# Patient Record
Sex: FEMALE | Race: WHITE | NOT HISPANIC OR LATINO | Employment: FULL TIME | URBAN - METROPOLITAN AREA
[De-identification: names, ages, dates, MRNs, and addresses within clinical notes are randomized per-mention and may not be internally consistent; named-entity substitution may affect disease eponyms.]

---

## 2017-02-05 ENCOUNTER — E-VISIT (OUTPATIENT)
Dept: FAMILY MEDICINE | Facility: CLINIC | Age: 32
End: 2017-02-05
Payer: COMMERCIAL

## 2017-02-05 DIAGNOSIS — R10.32 ABDOMINAL PAIN, LEFT LOWER QUADRANT: Primary | ICD-10-CM

## 2017-02-05 PROCEDURE — 99207 ZZC NO BILLABLE SERVICE THIS VISIT: CPT | Performed by: FAMILY MEDICINE

## 2017-02-05 NOTE — MR AVS SNAPSHOT
After Visit Summary   2/5/2017    Katiuska De Jesus    MRN: [id number]           Patient Information     Date Of Birth          1985        Visit Information        Provider Department      2/5/2017 9:02 PM Claudia Thomas MD [facility]        Today's Diagnoses     Abdominal pain, left lower quadrant    -  1       Follow-ups after your visit        Who to contact     If you have questions or need follow up information about today's clinic visit or your schedule please contact [facility] directly at [phone].  Normal or non-critical lab and imaging results will be communicated to you by Netviewerhart, letter or phone within 4 business days after the clinic has received the results. If you do not hear from us within 7 days, please contact the clinic through ShoorKt or phone. If you have a critical or abnormal lab result, we will notify you by phone as soon as possible.  Submit refill requests through Quofore or call your pharmacy and they will forward the refill request to us. Please allow 3 business days for your refill to be completed.          Additional Information About Your Visit        MyChart Information     Quofore gives you secure access to your electronic health record. If you see a primary care provider, you can also send messages to your care team and make appointments. If you have questions, please call your primary care clinic.  If you do not have a primary care provider, please call [phone] and they will assist you.        Care EveryWhere ID     This is your Care EveryWhere ID. This could be used by other organizations to access your [place] medical records  [id number]         Blood Pressure from Last 3 Encounters:   06/17/16 100/60   11/08/15 100/62   10/22/15 104/60    Weight from Last 3 Encounters:   06/17/16 168 lb 12.8 oz (76.6 kg)   11/08/15 174 lb (78.9 kg)   10/22/15 174 lb (78.9 kg)              Today, you had the following      No orders found for display       Primary Care Provider Office Phone # Fax #    Claudia Thomas [phone]       [facility] [address]  [place] [address]        Thank you!     Thank you for choosing [facility]  for your care. Our goal is always to provide you with excellent care. Hearing back from our patients is one way we can continue to improve our services. Please take a few minutes to complete the written survey that you may receive in the mail after your visit with us. Thank you!             Your Updated Medication List - Protect others around you: Learn how to safely use, store and throw away your medicines at www.disposemymeds.org.          This list is accurate as of: 2/5/17 11:59 PM.  Always use your most recent med list.                   Brand Name Dispense Instructions for use    levothyroxine 50 MCG tablet    SYNTHROID/LEVOTHROID    90 tablet    Take 1 tablet (50 mcg) by mouth daily       loratadine 10 MG capsule      Take 10 mg by mouth daily.       norethindrone 0.35 MG per tablet    TRINI    28 tablet    Take 1 tablet (0.35 mg) by mouth daily       UNKNOWN TO PATIENT      Birth control       vitamin D 1000 UNITS capsule      Take 3 capsules by mouth daily.

## 2017-02-06 NOTE — TELEPHONE ENCOUNTER
Not sure what she means, but this is not something I can diagnose over a Numerifyt message based on the information provided.  Please triage, thanks.

## 2017-06-04 DIAGNOSIS — E03.9 ACQUIRED HYPOTHYROIDISM: ICD-10-CM

## 2017-06-04 NOTE — TELEPHONE ENCOUNTER
Medication Detail      Disp Refills Start End SALMA   levothyroxine (SYNTHROID, LEVOTHROID) 50 MCG tablet 90 tablet 3 6/17/2016  No   Sig: Take 1 tablet (50 mcg) by mouth daily       Last Office Visit with G, P or [facility] prescribing provider: 6/17/2016  Next 5 appointments (look out 90 days)     Jun 07, 2017  2:40 PM CDT   PHYS[facility] with Claudia Thomas MD   [facility]    [address] [place] [id number] [phone]                   TSH   Date Value Ref Range Status   05/29/2015 1.91 0.40 - 4.00 mU/L Final

## 2017-06-05 RX ORDER — LEVOTHYROXINE SODIUM 50 UG/1
TABLET ORAL
Qty: 90 TABLET | Refills: 0 | Status: SHIPPED | OUTPATIENT
Start: 2017-06-05 | End: 2017-06-07

## 2017-06-05 NOTE — TELEPHONE ENCOUNTER
Agree, needs office visit yearly (due this month) and labs.  I will refill.  I will just order the lab when I see her in the office, in case there are other labs that we need to get at that time.

## 2017-06-05 NOTE — TELEPHONE ENCOUNTER
Routing refill request to provider for review/approval because:  Labs not current:  TSH  Due for annual office visit     Routing to  Reception - please advise patient due for annual office visit in 10 days with lab recheck of thyroid    Thank you,  Dianna Fuller RN

## 2017-06-07 ENCOUNTER — OFFICE VISIT (OUTPATIENT)
Dept: FAMILY MEDICINE | Facility: CLINIC | Age: 32
End: 2017-06-07
Payer: COMMERCIAL

## 2017-06-07 VITALS
RESPIRATION RATE: 16 BRPM | TEMPERATURE: 98.4 F | WEIGHT: 168.8 LBS | SYSTOLIC BLOOD PRESSURE: 116 MMHG | BODY MASS INDEX: 28.12 KG/M2 | OXYGEN SATURATION: 97 % | DIASTOLIC BLOOD PRESSURE: 68 MMHG | HEIGHT: 65 IN | HEART RATE: 70 BPM

## 2017-06-07 DIAGNOSIS — E03.9 ACQUIRED HYPOTHYROIDISM: ICD-10-CM

## 2017-06-07 DIAGNOSIS — Z91.018 FOOD ALLERGY: ICD-10-CM

## 2017-06-07 DIAGNOSIS — Z00.00 ROUTINE GENERAL MEDICAL EXAMINATION AT A HEALTH CARE FACILITY: Primary | ICD-10-CM

## 2017-06-07 DIAGNOSIS — K11.20 SIALADENITIS: ICD-10-CM

## 2017-06-07 DIAGNOSIS — Z11.3 SCREEN FOR STD (SEXUALLY TRANSMITTED DISEASE): ICD-10-CM

## 2017-06-07 LAB
MICRO REPORT STATUS: NORMAL
SPECIMEN SOURCE: NORMAL
WET PREP SPEC: NORMAL

## 2017-06-07 PROCEDURE — 84443 ASSAY THYROID STIM HORMONE: CPT | Performed by: FAMILY MEDICINE

## 2017-06-07 PROCEDURE — 86003 ALLG SPEC IGE CRUDE XTRC EA: CPT | Performed by: FAMILY MEDICINE

## 2017-06-07 PROCEDURE — 87491 CHLMYD TRACH DNA AMP PROBE: CPT | Performed by: FAMILY MEDICINE

## 2017-06-07 PROCEDURE — 99395 PREV VISIT EST AGE 18-39: CPT | Performed by: FAMILY MEDICINE

## 2017-06-07 PROCEDURE — 36415 COLL VENOUS BLD VENIPUNCTURE: CPT | Performed by: FAMILY MEDICINE

## 2017-06-07 PROCEDURE — 87389 HIV-1 AG W/HIV-1&-2 AB AG IA: CPT | Performed by: FAMILY MEDICINE

## 2017-06-07 PROCEDURE — 86780 TREPONEMA PALLIDUM: CPT | Performed by: FAMILY MEDICINE

## 2017-06-07 PROCEDURE — 87210 SMEAR WET MOUNT SALINE/INK: CPT | Performed by: FAMILY MEDICINE

## 2017-06-07 PROCEDURE — 87591 N.GONORRHOEAE DNA AMP PROB: CPT | Performed by: FAMILY MEDICINE

## 2017-06-07 PROCEDURE — 99212 OFFICE O/P EST SF 10 MIN: CPT | Mod: 25 | Performed by: FAMILY MEDICINE

## 2017-06-07 RX ORDER — LEVOTHYROXINE SODIUM 50 UG/1
50 TABLET ORAL DAILY
Qty: 90 TABLET | Refills: 3 | Status: SHIPPED | OUTPATIENT
Start: 2017-06-07 | End: 2020-10-19

## 2017-06-07 NOTE — PROGRESS NOTES
SUBJECTIVE:     CC: Katiuska De Jesus is an 31 year old woman who presents for preventive health visit.     Physical   Annual:     Getting at least 3 servings of Calcium per day::  Yes    Bi-annual eye exam::  NO    Dental care twice a year::  NO    Sleep apnea or symptoms of sleep apnea::  Daytime drowsiness    Diet::  Gluten-free/reduced    Frequency of exercise::  2-3 days/week    Duration of exercise::  15-30 minutes    Taking medications regularly::  Yes    Medication side effects::  None    Additional concerns today::  YES (STD check)    CV: no known early CAD in the family  Malignancy: no known breast or colon cancer.  She states she is due for a pap; however she had a normal pap and negative HPV test last year.    Bone health: hypothyroidism  Immunizations: tdap done 2013  Sexual health:Periods have become a little irregular (hda one period that lasted 2 weeks in the middle of the pill pack, then had a normal period two weeks later during the placebo week) despite good compliance with progesterone-only OCP (she has h/o migraine with aura, so is not on combined hormones).  She is thinking about stopping the OCP.  She has had one new partner since her last STI screen; the partner's status is unknown. She used a condom. She has had one regular period since that time.  She has noticed intermittent discharge that typically precedes her period (yellowish/greenish).  She wonders if the vitamins she takes might affect this.  Finally, she notes that she has had dyspareunia ever since she became sexually active (age 24).  She did not know that this was not normal until recently.    Depression screen: neg    Other: over the past one year she has been having intermittent pain over her left salivary gland (points to submandibular and parotid areas); lately the pain has become more constant.  eating sweets seems to make it worse.  At times, the pain radiates up to her TMJ, and it hurts to open her jaw.  She has noticed  some swelling over the area.  She has tried benadryl tylenol ASA--nothing has helped.  Pain is stabbing.  Applying pressure over the area seems to help, so she does this especially when she is eating.  She has also had a popping sound like popcorn i her left ear, which has been ongoing for a few years but now seems worse with her gland/jaw pain.  It is worse when she lies down on her right side.  She has to sleep on her left side; this seems to muffle the sound.     She has had some lower back pain associated with sitting in her chair at work.  She has some intermittent tingling inn her back; none in her extremities.      Positive LETICIA, headaches, skin rash when she is in the sun, blurry vision, mouth sores--she never saw a rheumatologist, as her deductible is high.    She feels she is sensitive to many types of foods and would like allergy testing done.      Today's PHQ-2 Score:   PHQ-2 ( 1999 Pfizer) 6/5/2017   Q1: Little interest or pleasure in doing things 0   Q2: Feeling down, depressed or hopeless 1   PHQ-2 Score 1   Q1: Little interest or pleasure in doing things Not at all   Q2: Feeling down, depressed or hopeless Several days   PHQ-2 Score 1       Abuse: Current or Past(Physical, Sexual or Emotional)- No  Do you feel safe in your environment - Yes    Social History   Substance Use Topics     Smoking status: Never Smoker     Smokeless tobacco: Never Used      Comment: smokers in home     Alcohol use Yes      Comment: rare     The patient does not drink >3 drinks per day nor >7 drinks per week.    Recent Labs   Lab Test  07/19/13   1407   CHOL  146   HDL  45*   LDL  77   TRIG  116   CHOLHDLRATIO  3.2       Reviewed orders with patient.  Reviewed health maintenance and updated orders accordingly - Yes    Mammo Decision Support:  Mammogram not appropriate for this patient based on age.    Pertinent mammograms are reviewed under the imaging tab.  History of abnormal Pap smear: NO - age 30-65 PAP every 5 years  with negative HPV co-testing recommended    Reviewed and updated as needed this visit by clinical staff         Reviewed and updated as needed this visit by Provider            ROS:  C: NEGATIVE for fever, chills, change in weight  I: NEGATIVE for worrisome rashes, moles or lesions  E: NEGATIVE for vision changes or irritation  ENT: see HPI  R: NEGATIVE for significant cough or SOB  B: NEGATIVE for masses, tenderness or discharge  CV: NEGATIVE for chest pain, palpitations or peripheral edema  GI: NEGATIVE for nausea, abdominal pain, heartburn, or change in bowel habits  : NEGATIVE for unusual urinary or vaginal symptoms. See HPI.  M: NEGATIVE for significant arthralgias or myalgia other than back pain.  N: NEGATIVE for weakness, dizziness or paresthesias  P: NEGATIVE for changes in mood or affect    Patient Active Problem List   Diagnosis     Hypothyroidism     Past Surgical History:   Procedure Laterality Date     NO HISTORY OF SURGERY         Social History   Substance Use Topics     Smoking status: Never Smoker     Smokeless tobacco: Never Used      Comment: smokers in home     Alcohol use Yes      Comment: rare     Family History   Problem Relation Age of Onset     Thyroid Disease Other      Substance Abuse Father      EtOH     Thyroid Disease Maternal Grandmother      CANCER Brother      tumor in his thigh     Other Cancer Brother          Current Outpatient Prescriptions   Medication Sig Dispense Refill     levothyroxine (SYNTHROID/LEVOTHROID) 50 MCG tablet Take 1 tablet (50 mcg) by mouth daily 90 tablet 3     norethindrone (MICRONOR) 0.35 MG per tablet TAKE ONE TABLET BY MOUTH ONE TIME DAILY  28 tablet 0     UNKNOWN TO PATIENT Birth control       Cholecalciferol (VITAMIN D) 1000 UNITS capsule Take 3 capsules by mouth daily.       loratadine 10 MG capsule Take 10 mg by mouth daily.       Allergies   Allergen Reactions     Ibuprofen Sodium Cramps and GI Disturbance     OBJECTIVE:     There were no vitals  taken for this visit.  EXAM:  GENERAL: healthy, alert and no distress  EYES: Eyes grossly normal to inspection, PERRL and conjunctivae and sclerae normal  HENT: ear canals and TM's normal, nose and mouth without ulcers or lesions  NECK: no adenopathy, no asymmetry, masses, or scars and thyroid normal to palpation  RESP: lungs clear to auscultation - no rales, rhonchi or wheezes  BREAST: normal without masses, tenderness or nipple discharge and no palpable axillary masses or adenopathy  CV: regular rate and rhythm, normal S1 S2, no S3 or S4, no murmur, click or rub, no peripheral edema and peripheral pulses strong  ABDOMEN: soft, nontender, no hepatosplenomegaly, no masses and bowel sounds normal  MS: no gross musculoskeletal defects noted, no edema  SKIN: no suspicious lesions or rashes  NEURO: Normal strength and tone, mentation intact and speech normal  PSYCH: mentation appears normal, affect normal/bright    ASSESSMENT/PLAN:     1. Routine general medical examination at a health care facility  CV: she has had normal lipid panel (except low HDL) in 2013, normal fasting glucose (69) last year.    Malignancy: pap and HPV done last year, advised repeat in four more years.  Bone health: thyroid disease.   Immunizations: up to date  Sexual health: see below.  If she would like to see a gynecologist regarding her dyspareunia, or if she desires to try other contraception such as IUD I am happy to refer her.      2. Acquired hypothyroidism  -TSH  - levothyroxine (SYNTHROID/LEVOTHROID) 50 MCG tablet; Take 1 tablet (50 mcg) by mouth daily  Dispense: 90 tablet; Refill: 3    3. Screen for STD (sexually transmitted disease)    - HIV Antigen Antibody Combo  - Anti Treponema  - NEISSERIA GONORRHOEA PCR  - CHLAMYDIA TRACHOMATIS PCR  - Wet prep    4. Food allergy    - Allergy adult food panel    5.  Left gland pain: likely sialadenitis; no evidence for infection at this time, advised conservative treatments.  I don't know whether  "she might have TMJ as a separate condition or whether the jaw pain is related.  The ear symptoms might be related to eustachian tube dysfunction; her ear exam was normal.   -apply warm compresses to left parotid and submandibular glands, can try sucking on lemon candy  -advised ENT evaluation if not improving.       COUNSELING:  Reviewed preventive health counseling, as reflected in patient instructions       Safe sex practices/STD prevention         reports that she has never smoked. She has never used smokeless tobacco.    Estimated body mass index is 28.09 kg/(m^2) as calculated from the following:    Height as of 6/17/16: 5' 5\" (1.651 m).    Weight as of 6/17/16: 168 lb 12.8 oz (76.6 kg).     Counseling Resources:  ATP IV Guidelines  Pooled Cohorts Equation Calculator  Breast Cancer Risk Calculator  FRAX Risk Assessment  ICSI Preventive Guidelines  Dietary Guidelines for Americans, 2010  USDA's MyPlate  ASA Prophylaxis  Lung CA Screening    Claudia Thomas MD  [facility]  Answers for HPI/ROS submitted by the patient on 6/5/2017   PHQ-2 Score: 1    "

## 2017-06-07 NOTE — MR AVS SNAPSHOT
After Visit Summary   6/7/2017    Katiuska De Jesus    MRN: [id number]           Patient Information     Date Of Birth          1985        Visit Information        Provider Department      6/7/2017 2:40 PM Claudia Thomas MD [facility]        Today's Diagnoses     Routine general medical examination at a health care facility    -  1    Acquired hypothyroidism        Screen for STD (sexually transmitted disease)        Food allergy        Sialadenitis          Care Instructions      Preventive Health Recommendations  Female Ages 26 - 39  Yearly exam:   See your health care provider every year in order to    Review health changes.     Discuss preventive care.      Review your medicines if you your doctor has prescribed any.    Until age 30: Get a Pap test every three years (more often if you have had an abnormal result).    After age 30: Talk to your doctor about whether you should have a Pap test every 3 years or have a Pap test with HPV screening every 5 years.   You do not need a Pap test if your uterus was removed (hysterectomy) and you have not had cancer.  You should be tested each year for STDs (sexually transmitted diseases), if you're at risk.   Talk to your provider about how often to have your cholesterol checked.  If you are at risk for diabetes, you should have a diabetes test (fasting glucose).  Shots: Get a flu shot each year. Get a tetanus shot every 10 years.   Nutrition:     Eat at least 5 servings of fruits and vegetables each day.    Eat whole-grain bread, whole-wheat pasta and brown rice instead of white grains and rice.    Talk to your provider about Calcium and Vitamin D.     Lifestyle    Exercise at least 150 minutes a week (30 minutes a day, 5 days of the week). This will help you control your weight and prevent disease.    Limit alcohol to one drink per day.    No smoking.     Wear sunscreen to prevent skin cancer.    See your dentist every six  "months for an exam and cleaning.            Follow-ups after your visit        Who to contact     If you have questions or need follow up information about today's clinic visit or your schedule please contact [facility] directly at [phone].  Normal or non-critical lab and imaging results will be communicated to you by Swirlhart, letter or phone within 4 business days after the clinic has received the results. If you do not hear from us within 7 days, please contact the clinic through Swirlhart or phone. If you have a critical or abnormal lab result, we will notify you by phone as soon as possible.  Submit refill requests through PassHat or call your pharmacy and they will forward the refill request to us. Please allow 3 business days for your refill to be completed.          Additional Information About Your Visit        SwirlharOrgdot Information     PassHat gives you secure access to your electronic health record. If you see a primary care provider, you can also send messages to your care team and make appointments. If you have questions, please call your primary care clinic.  If you do not have a primary care provider, please call [phone] and they will assist you.        Care EveryWhere ID     This is your Care EveryWhere ID. This could be used by other organizations to access your [place] medical records  [id number]        Your Vitals Were     Pulse Temperature Respirations Height Last Period Pulse Oximetry    70 98.4  F (36.9  C) (Tympanic) 16 5' 5\" (1.651 m) 05/17/2017 (Approximate) 97%    BMI (Body Mass Index)                   28.09 kg/m2            Blood Pressure from Last 3 Encounters:   06/07/17 116/68   06/17/16 100/60   11/08/15 100/62    Weight from Last 3 Encounters:   06/07/17 168 lb 12.8 oz (76.6 kg)   06/17/16 168 lb 12.8 oz (76.6 kg)   11/08/15 174 lb (78.9 kg)              We Performed the Following     Allergy adult food panel     Anti Treponema     CHLAMYDIA TRACHOMATIS " PCR     HIV Antigen Antibody Combo     NEISSERIA GONORRHOEA PCR     TSH with free T4 reflex     Wet prep          Today's Medication Changes          These changes are accurate as of: 6/7/17 11:59 PM.  If you have any questions, ask your nurse or doctor.               These medicines have changed or have updated prescriptions.        Dose/Directions    levothyroxine 50 MCG tablet   Commonly known as:  SYNTHROID/LEVOTHROID   This may have changed:  See the new instructions.   Used for:  Acquired hypothyroidism   Changed by:  Claudia Thomas MD        Dose:  50 mcg   Take 1 tablet (50 mcg) by mouth daily   Quantity:  90 tablet   Refills:  3            Where to get your medicines      These medications were sent to [id number] - [place], [place] - [address] [facility]  [address], [place] [address]     Phone:  [phone]     levothyroxine 50 MCG tablet                Primary Care Provider Office Phone # Fax #    Claudia Thomas [phone]       [facility] [address]  [place] [address]        Thank you!     Thank you for choosing [facility]  for your care. Our goal is always to provide you with excellent care. Hearing back from our patients is one way we can continue to improve our services. Please take a few minutes to complete the written survey that you may receive in the mail after your visit with us. Thank you!             Your Updated Medication List - Protect others around you: Learn how to safely use, store and throw away your medicines at www.disposemymeds.org.          This list is accurate as of: 6/7/17 11:59 PM.  Always use your most recent med list.                   Brand Name Dispense Instructions for use    levothyroxine 50 MCG tablet    SYNTHROID/LEVOTHROID    90 tablet    Take 1 tablet (50 mcg) by mouth daily       loratadine 10 MG capsule      Take 10 mg by mouth daily.       norethindrone 0.35 MG per tablet     MICRONOR    28 tablet    TAKE ONE TABLET BY MOUTH ONE TIME DAILY       UNKNOWN TO PATIENT      Birth control       vitamin D 1000 UNITS capsule      Take 3 capsules by mouth daily.

## 2017-06-08 PROBLEM — R76.8 ELEVATED ANTINUCLEAR ANTIBODY (ANA) LEVEL: Status: ACTIVE | Noted: 2017-06-08

## 2017-06-08 LAB
HIV 1+2 AB+HIV1 P24 AG SERPL QL IA: NORMAL
T PALLIDUM IGG+IGM SER QL: NEGATIVE
TSH SERPL DL<=0.005 MIU/L-ACNC: 1.54 MU/L (ref 0.4–4)

## 2017-06-09 LAB
C TRACH DNA SPEC QL NAA+PROBE: NORMAL
CLAM IGE QN: NORMAL KU(A)/L
CODFISH IGE QN: NORMAL KU(A)/L
CORN IGE QN: NORMAL KU(A)/L
COW MILK IGE QN: NORMAL KU/L
EGG WHITE IGE QN: NORMAL KU(A)/L
N GONORRHOEA DNA SPEC QL NAA+PROBE: NORMAL
PEANUT IGE QN: NORMAL KU(A)/L
SCALLOP IGE QN: NORMAL KU(A)/L
SHRIMP IGE QN: NORMAL KU(A)/L
SOYBEAN IGE QN: NORMAL KU(A)/L
SPECIMEN SOURCE: NORMAL
SPECIMEN SOURCE: NORMAL
WALNUT IGE QN: NORMAL KU(A)/L
WHEAT IGE QN: NORMAL KU(A)/L

## 2017-06-29 ENCOUNTER — MYC MEDICAL ADVICE (OUTPATIENT)
Dept: FAMILY MEDICINE | Facility: CLINIC | Age: 32
End: 2017-06-29

## 2017-06-30 RX ORDER — ACETAMINOPHEN AND CODEINE PHOSPHATE 120; 12 MG/5ML; MG/5ML
1 SOLUTION ORAL DAILY
Qty: 84 TABLET | Refills: 3 | Status: SHIPPED | OUTPATIENT
Start: 2017-06-30 | End: 2018-06-02

## 2018-01-05 ENCOUNTER — MYC MEDICAL ADVICE (OUTPATIENT)
Dept: FAMILY MEDICINE | Facility: CLINIC | Age: 33
End: 2018-01-05

## 2018-01-05 DIAGNOSIS — R76.8 ELEVATED ANTINUCLEAR ANTIBODY (ANA) LEVEL: Primary | ICD-10-CM

## 2018-01-05 NOTE — TELEPHONE ENCOUNTER
Are you ok with ordering the giardia test? Or would you like a visit of some kind? Sign off on referral if ok

## 2018-01-08 NOTE — TELEPHONE ENCOUNTER
Referral signed, please give her the numbers.    If she is having diarrhea, ok to just order the test.  Generally not a need to check if no diarrhea.  thanks

## 2018-09-27 ENCOUNTER — RADIANT APPOINTMENT (OUTPATIENT)
Dept: GENERAL RADIOLOGY | Facility: CLINIC | Age: 33
End: 2018-09-27
Attending: NURSE PRACTITIONER
Payer: COMMERCIAL

## 2018-09-27 ENCOUNTER — OFFICE VISIT (OUTPATIENT)
Dept: FAMILY MEDICINE | Facility: CLINIC | Age: 33
End: 2018-09-27
Payer: COMMERCIAL

## 2018-09-27 VITALS
WEIGHT: 163 LBS | SYSTOLIC BLOOD PRESSURE: 116 MMHG | BODY MASS INDEX: 26.2 KG/M2 | TEMPERATURE: 98 F | RESPIRATION RATE: 16 BRPM | HEIGHT: 66 IN | HEART RATE: 79 BPM | DIASTOLIC BLOOD PRESSURE: 82 MMHG | OXYGEN SATURATION: 100 %

## 2018-09-27 DIAGNOSIS — M25.521 RIGHT ELBOW PAIN: Primary | ICD-10-CM

## 2018-09-27 DIAGNOSIS — M25.521 RIGHT ELBOW PAIN: ICD-10-CM

## 2018-09-27 PROCEDURE — 73080 X-RAY EXAM OF ELBOW: CPT | Mod: RT

## 2018-09-27 PROCEDURE — 99213 OFFICE O/P EST LOW 20 MIN: CPT | Performed by: NURSE PRACTITIONER

## 2018-09-27 RX ORDER — FLUTICASONE PROPIONATE 50 MCG
1 SPRAY, SUSPENSION (ML) NASAL DAILY
COMMUNITY

## 2018-09-27 NOTE — PROGRESS NOTES
"  SUBJECTIVE:   Katiuska De Jesus is a 32 year old female who presents to clinic today for the following health issues:  Chief Complaint   Patient presents with     Elbow Pain         Musculoskeletal problem/pain    Duration: since yesterday    Description  Yesterday Katiuska Was doing some work around her house.  As she was pulling her arms down from putting something away on an upper level shelf, as she was coming down forcefully her arm hit the top of the microwave cart.  She had immediate pain in her right elbow.  She had a small abrasion that \"bleeding quickly.  She is here in the clinic today because the pain, swelling and range of motion is all affected.  She denies any other injuries.    Location: right elbow    Intensity:  3-7/10    Accompanying signs and symptoms: pain radiating up front of arm    History  Previous similar problem: no   Previous evaluation:  none    Precipitating or alleviating factors:  Trauma or overuse: YES- patient pulled socks off of the shelves and bumped right elbow on lower shelf  Aggravating factors include: typing, using hand, trying to sleep    Therapies tried and outcome: elevation, keep arm across stomach, Tylenole      Problem list and histories reviewed & adjusted, as indicated.  Additional history: as documented    Patient Active Problem List   Diagnosis     Hypothyroidism     Elevated antinuclear antibody (LETICIA) level     Past Surgical History:   Procedure Laterality Date     NO HISTORY OF SURGERY         Social History   Substance Use Topics     Smoking status: Never Smoker     Smokeless tobacco: Never Used      Comment: smokers in home     Alcohol use Yes      Comment: rare     Family History   Problem Relation Age of Onset     Thyroid Disease Other      Substance Abuse Father      EtOH     Thyroid Disease Maternal Grandmother      Cancer Brother      tumor in his thigh     Other Cancer Brother      Thyroid Disease Mother          Current Outpatient Prescriptions " "  Medication Sig Dispense Refill     fluticasone (FLONASE) 50 MCG/ACT spray Spray 1 spray into both nostrils daily       levothyroxine (SYNTHROID/LEVOTHROID) 50 MCG tablet Take 1 tablet (50 mcg) by mouth daily (Patient taking differently: Take by mouth daily 75 mcg) 90 tablet 3     Cholecalciferol (VITAMIN D) 1000 UNITS capsule Take 3 capsules by mouth daily.       loratadine 10 MG capsule Take 10 mg by mouth daily.       norethindrone (MICRONOR) 0.35 MG per tablet take 1 tablet by mouth daily (Patient not taking: Reported on 9/27/2018) 84 tablet 0     Allergies   Allergen Reactions     Ibuprofen Sodium Cramps and GI Disturbance     Recent Labs   Lab Test  06/07/17   1538  05/29/15   1502  12/16/13 07/19/13   1407  11/11/10   1200   LDL   --    --    --    --   77   --    HDL   --    --    --    --   45*   --    TRIG   --    --    --    --   116   --    CR   --    --    --   0.80   --   0.90   GFRESTIMATED   --    --    --    --    --   76   GFRESTBLACK   --    --    --    --    --   >90   POTASSIUM   --    --    --   3.5   --   3.8   TSH  1.54  1.91   < >  6.4   --   3.52    < > = values in this interval not displayed.      BP Readings from Last 3 Encounters:   09/27/18 116/82   06/07/17 116/68   06/17/16 100/60    Wt Readings from Last 3 Encounters:   09/27/18 163 lb (73.9 kg)   06/07/17 168 lb 12.8 oz (76.6 kg)   06/17/16 168 lb 12.8 oz (76.6 kg)               Labs reviewed in EPIC    Reviewed and updated as needed this visit by clinical staff       Reviewed and updated as needed this visit by Provider         ROS:  Constitutional, HEENT, cardiovascular, pulmonary, gi and gu systems are negative, except as otherwise noted.    OBJECTIVE:     /82  Pulse 79  Temp 98  F (36.7  C) (Oral)  Resp 16  Ht 5' 5.5\" (1.664 m)  Wt 163 lb (73.9 kg)  LMP 09/25/2018 (Approximate)  SpO2 100%  BMI 26.71 kg/m2  Body mass index is 26.71 kg/(m^2).  GENERAL APPEARANCE: healthy, alert and mild distress. Smiling.   SKIN: " warm and dry    MS: sitting in the chair with her right arm bent to 90 degrees, supporting her right arm with her left hand.  Right elbow without deformity.  There is some swelling to the olecranon and swelling to her inner bicep.  ROM is globally limited.  Cwms intact distally.    PSYCH: mentation appears normal and affect normal/bright.  Good eye contact.    Elbow xray:  Negative for fracture.      ASSESSMENT/PLAN:     (M25.521) Right elbow pain  (primary encounter diagnosis)  Comment: acute  Plan: XR Elbow Right G/E 3 Views, order for DME         I reassured the patient today that I did not see a fracture on her x-ray.  I did encourage her to use ice packs intermittently for the next 48 hours and then switch to heat as needed for pain in her right elbow.  We also gave her a sling today, which she feels will be very helpful for supporting her Elbow.  I did encourage her to move her elbow intermittently.  In the event that the radiologist's final review of her x-ray does come back showing a fracture, she will be notified immediately.   She will use Tylenol as needed for pain.  She will follow-up with family practice with any new or continuing concerns.    DEMETRIUS Berry [facility]

## 2018-09-27 NOTE — MR AVS SNAPSHOT
After Visit Summary   9/27/2018    Katiuska De Jesus    MRN: [id number]           Patient Information     Date Of Birth          1985        Visit Information        Provider Department      9/27/2018 1:40 PM Joann Vega APRN [facility]        Today's Diagnoses     Right elbow pain    -  1      Care Instructions      Elbow Bruise  You have a bruise (contusion) of your elbow. A bruise causes local pain, swelling, and sometimes bruising. There are no broken bones. This injury takes a few days to a few weeks to heal. You may be given a sling for comfort and arm support.  You may notice color changes over the skin. It may change from reddish to bluish to greenish or yellowish before the bruising fades. The skin will then go back to its normal color.  Home care  Follow these guidelines when caring for yourself at home.    Keep your arm elevated to reduce pain and swelling. This is most important during the first 2 days (48 hours) after the injury.    Put an ice pack on the injured area. Do this for 20 minutes every 1 to 2 hours the first day. You can make an ice pack by wrapping a plastic bag of ice in a thin towel. You should continue to use the ice pack 3 to 4 times a day for the next 2 days. Then use the ice pack as needed to ease pain and swelling.    Don t use a heating pad.    Don t stick a needle into the contusion or bruising to drain it.    You may use acetaminophen or ibuprofen to control pain, unless another pain medicine was prescribed. If you have chronic liver or kidney disease, talk with your healthcare provider before using these medicines. Also talk with your provider if you ve had a stomach ulcer or gastrointestinal bleeding.    If a sling was provided, you may take it off to shower or bathe. Don t wear it for more than 1 week or it may cause joint stiffness.  Follow-up care  Follow up with your healthcare provider, or as advised, if you are not  starting to get better within the next 3 days.  When to seek medical advice  Call your healthcare provider right away if any of these occur:    Pain or swelling gets worse    The back of your elbow becomes very swollen where it almost looks like a gold ball or egg-like mass is growing there. This is a sign of olecrenon bursitis or septic bursitis which may need immediate treatment.    Redness, red streaks down the arm, warmth, or drainage from the bruise    Hand or fingers becomes cold, blue, numb, or tingly    New bruises, and you don t know what caused them    Contusion doesn t heal  Date Last Reviewed: 2/1/2017 2000-2017 [employer]. [address] [place] [address], [place] [address]. All rights reserved. This information is not intended as a substitute for professional medical care. Always follow your healthcare professional's instructions.                Follow-ups after your visit        Who to contact     If you have questions or need follow up information about today's clinic visit or your schedule please contact [facility] directly at [phone].  Normal or non-critical lab and imaging results will be communicated to you by [employer], letter or phone within 4 business days after the clinic has received the results. If you do not hear from us within 7 days, please contact the clinic through [employer] or phone. If you have a critical or abnormal lab result, we will notify you by phone as soon as possible.  Submit refill requests through [employer] [facility] or call your pharmacy and they will forward the refill request to us. Please allow 3 business days for your refill to be completed.          Additional Information About Your Visit        [facility] Information     [employer] [facility] gives you secure access to your electronic health record. If you see a primary care provider, you can also send messages to your care team and make appointments. If you have questions, please call your primary care clinic.  If  "you do not have a primary care provider, please call [phone] and they will assist you.        Care EveryWhere ID     This is your Care EveryWhere ID. This could be used by other organizations to access your [place] medical records  [id number]        Your Vitals Were     Pulse Temperature Respirations Height Last Period Pulse Oximetry    79 98  F (36.7  C) (Oral) 16 5' 5.5\" (1.664 m) 09/25/2018 (Approximate) 100%    BMI (Body Mass Index)                   26.71 kg/m2            Blood Pressure from Last 3 Encounters:   09/27/18 116/82   06/07/17 116/68   06/17/16 100/60    Weight from Last 3 Encounters:   09/27/18 163 lb (73.9 kg)   06/07/17 168 lb 12.8 oz (76.6 kg)   06/17/16 168 lb 12.8 oz (76.6 kg)                 Today's Medication Changes          These changes are accurate as of 9/27/18  2:30 PM.  If you have any questions, ask your nurse or doctor.               Start taking these medicines.        Dose/Directions    order for DME   Used for:  Right elbow pain   Started by:  Joann Vega APRN CNP        Equipment being ordered: sling   Quantity:  1 Units   Refills:  0         These medicines have changed or have updated prescriptions.        Dose/Directions    levothyroxine 50 MCG tablet   Commonly known as:  SYNTHROID/LEVOTHROID   This may have changed:    - how much to take  - additional instructions   Used for:  Acquired hypothyroidism        Dose:  50 mcg   Take 1 tablet (50 mcg) by mouth daily   Quantity:  90 tablet   Refills:  3            Where to get your medicines      Some of these will need a paper prescription and others can be bought over the counter.  Ask your nurse if you have questions.     Bring a paper prescription for each of these medications     order for DME                Primary Care Provider Office Phone # Fax #    Claudia Thomas [phone] [address] [facility] [address]        Equal Access to Services     MARY SUÁREZ AH: Hadii " luisito Shetty, krys grewal, qamayankta kayahir chelsealeeann, dong mccallmaceykemar hampton dar. So [facility] [phone].    ATENCIÓN: Si habla español, tiene a helms disposición servicios gratuitos de asistencia lingüística. Hiramame al [phone].    We comply with applicable federal civil rights laws and [place] laws. We do not discriminate on the basis of race, color, national origin, age, disability, sex, sexual orientation, or gender identity.            Thank you!     Thank you for choosing [facility]  for your care. Our goal is always to provide you with excellent care. Hearing back from our patients is one way we can continue to improve our services. Please take a few minutes to complete the written survey that you may receive in the mail after your visit with us. Thank you!             Your Updated Medication List - Protect others around you: Learn how to safely use, store and throw away your medicines at www.disposemymeds.org.          This list is accurate as of 9/27/18  2:30 PM.  Always use your most recent med list.                   Brand Name Dispense Instructions for use Diagnosis    fluticasone 50 MCG/ACT spray    FLONASE     Spray 1 spray into both nostrils daily        levothyroxine 50 MCG tablet    SYNTHROID/LEVOTHROID    90 tablet    Take 1 tablet (50 mcg) by mouth daily    Acquired hypothyroidism       loratadine 10 MG capsule      Take 10 mg by mouth daily.        norethindrone 0.35 MG per tablet    MICRONOR    84 tablet    take 1 tablet by mouth daily    Contraception       order for DME     1 Units    Equipment being ordered: sling    Right elbow pain       vitamin D 1000 units capsule      Take 3 capsules by mouth daily.

## 2018-09-27 NOTE — PATIENT INSTRUCTIONS
Elbow Bruise  You have a bruise (contusion) of your elbow. A bruise causes local pain, swelling, and sometimes bruising. There are no broken bones. This injury takes a few days to a few weeks to heal. You may be given a sling for comfort and arm support.  You may notice color changes over the skin. It may change from reddish to bluish to greenish or yellowish before the bruising fades. The skin will then go back to its normal color.  Home care  Follow these guidelines when caring for yourself at home.    Keep your arm elevated to reduce pain and swelling. This is most important during the first 2 days (48 hours) after the injury.    Put an ice pack on the injured area. Do this for 20 minutes every 1 to 2 hours the first day. You can make an ice pack by wrapping a plastic bag of ice in a thin towel. You should continue to use the ice pack 3 to 4 times a day for the next 2 days. Then use the ice pack as needed to ease pain and swelling.    Don t use a heating pad.    Don t stick a needle into the contusion or bruising to drain it.    You may use acetaminophen or ibuprofen to control pain, unless another pain medicine was prescribed. If you have chronic liver or kidney disease, talk with your healthcare provider before using these medicines. Also talk with your provider if you ve had a stomach ulcer or gastrointestinal bleeding.    If a sling was provided, you may take it off to shower or bathe. Don t wear it for more than 1 week or it may cause joint stiffness.  Follow-up care  Follow up with your healthcare provider, or as advised, if you are not starting to get better within the next 3 days.  When to seek medical advice  Call your healthcare provider right away if any of these occur:    Pain or swelling gets worse    The back of your elbow becomes very swollen where it almost looks like a gold ball or egg-like mass is growing there. This is a sign of olecrenon bursitis or septic bursitis which may need immediate  treatment.    Redness, red streaks down the arm, warmth, or drainage from the bruise    Hand or fingers becomes cold, blue, numb, or tingly    New bruises, and you don t know what caused them    Contusion doesn t heal  Date Last Reviewed: 2/1/2017 2000-2017 [employer]. [address] [place], [place] [address]. All rights reserved. This information is not intended as a substitute for professional medical care. Always follow your healthcare professional's instructions.

## 2018-09-28 NOTE — PROGRESS NOTES
Truman Harp,    This note is to reassure you that the radiologist's final interpretation of your elbow xrays came back negative/normal with no fractures noted.    I hope you get better soon!    Joann ROMO CNP

## 2019-03-20 ENCOUNTER — OFFICE VISIT - HEALTHEAST (OUTPATIENT)
Dept: INTERNAL MEDICINE | Facility: CLINIC | Age: 34
End: 2019-03-20

## 2019-03-20 ENCOUNTER — COMMUNICATION - HEALTHEAST (OUTPATIENT)
Dept: TELEHEALTH | Facility: CLINIC | Age: 34
End: 2019-03-20

## 2019-03-20 DIAGNOSIS — N94.6 MENSTRUAL PAIN: ICD-10-CM

## 2019-03-20 DIAGNOSIS — F32.A DEPRESSION, UNSPECIFIED DEPRESSION TYPE: ICD-10-CM

## 2019-03-20 DIAGNOSIS — E03.9 HYPOTHYROIDISM, UNSPECIFIED TYPE: ICD-10-CM

## 2019-03-20 DIAGNOSIS — N92.6 IRREGULAR PERIODS: ICD-10-CM

## 2019-03-20 LAB — TSH SERPL DL<=0.005 MIU/L-ACNC: 2.27 UIU/ML (ref 0.3–5)

## 2019-03-20 ASSESSMENT — MIFFLIN-ST. JEOR: SCORE: 1475.6

## 2019-03-21 ENCOUNTER — COMMUNICATION - HEALTHEAST (OUTPATIENT)
Dept: INTERNAL MEDICINE | Facility: CLINIC | Age: 34
End: 2019-03-21

## 2019-04-04 ENCOUNTER — COMMUNICATION - HEALTHEAST (OUTPATIENT)
Dept: ADMINISTRATIVE | Facility: CLINIC | Age: 34
End: 2019-04-04

## 2019-04-04 ENCOUNTER — OFFICE VISIT - HEALTHEAST (OUTPATIENT)
Dept: OBGYN | Facility: CLINIC | Age: 34
End: 2019-04-04

## 2019-04-04 ENCOUNTER — AMBULATORY - HEALTHEAST (OUTPATIENT)
Dept: OBGYN | Facility: CLINIC | Age: 34
End: 2019-04-04

## 2019-04-04 DIAGNOSIS — N92.1 MENORRHAGIA WITH IRREGULAR CYCLE: ICD-10-CM

## 2019-04-04 DIAGNOSIS — N94.6 DYSMENORRHEA: ICD-10-CM

## 2019-04-04 DIAGNOSIS — N88.2 STENOSIS, CERVIX: ICD-10-CM

## 2019-04-04 DIAGNOSIS — Z01.812 PRE-PROCEDURE LAB EXAM: ICD-10-CM

## 2019-04-04 ASSESSMENT — MIFFLIN-ST. JEOR: SCORE: 1457.46

## 2019-04-16 ENCOUNTER — AMBULATORY - HEALTHEAST (OUTPATIENT)
Dept: LAB | Facility: CLINIC | Age: 34
End: 2019-04-16

## 2019-04-16 ENCOUNTER — OFFICE VISIT - HEALTHEAST (OUTPATIENT)
Dept: OBGYN | Facility: CLINIC | Age: 34
End: 2019-04-16

## 2019-04-16 DIAGNOSIS — Z30.430 ENCOUNTER FOR IUD INSERTION: ICD-10-CM

## 2019-04-16 DIAGNOSIS — Z01.812 PRE-PROCEDURE LAB EXAM: ICD-10-CM

## 2019-04-16 LAB — HCG UR QL: NEGATIVE

## 2019-04-16 ASSESSMENT — MIFFLIN-ST. JEOR: SCORE: 1448.39

## 2019-04-17 ENCOUNTER — COMMUNICATION - HEALTHEAST (OUTPATIENT)
Dept: INTERNAL MEDICINE | Facility: CLINIC | Age: 34
End: 2019-04-17

## 2019-04-17 DIAGNOSIS — F32.A DEPRESSION, UNSPECIFIED DEPRESSION TYPE: ICD-10-CM

## 2019-04-26 ENCOUNTER — OFFICE VISIT - HEALTHEAST (OUTPATIENT)
Dept: INTERNAL MEDICINE | Facility: CLINIC | Age: 34
End: 2019-04-26

## 2019-04-26 DIAGNOSIS — F32.A DEPRESSION, UNSPECIFIED DEPRESSION TYPE: ICD-10-CM

## 2019-04-26 DIAGNOSIS — N92.6 IRREGULAR PERIODS: ICD-10-CM

## 2019-04-26 ASSESSMENT — MIFFLIN-ST. JEOR: SCORE: 1461.99

## 2019-05-15 ENCOUNTER — COMMUNICATION - HEALTHEAST (OUTPATIENT)
Dept: INTERNAL MEDICINE | Facility: CLINIC | Age: 34
End: 2019-05-15

## 2019-06-10 ENCOUNTER — OFFICE VISIT - HEALTHEAST (OUTPATIENT)
Dept: FAMILY MEDICINE | Facility: CLINIC | Age: 34
End: 2019-06-10

## 2019-06-10 DIAGNOSIS — L03.213 PERIORBITAL CELLULITIS OF RIGHT EYE: ICD-10-CM

## 2019-07-19 ENCOUNTER — OFFICE VISIT - HEALTHEAST (OUTPATIENT)
Dept: INTERNAL MEDICINE | Facility: CLINIC | Age: 34
End: 2019-07-19

## 2019-07-19 DIAGNOSIS — N92.6 IRREGULAR PERIODS: ICD-10-CM

## 2019-07-19 DIAGNOSIS — G47.9 SLEEPING DIFFICULTIES: ICD-10-CM

## 2019-07-19 DIAGNOSIS — F32.A DEPRESSION, UNSPECIFIED DEPRESSION TYPE: ICD-10-CM

## 2019-07-19 ASSESSMENT — MIFFLIN-ST. JEOR: SCORE: 1484.67

## 2019-07-26 ENCOUNTER — OFFICE VISIT - HEALTHEAST (OUTPATIENT)
Dept: SLEEP MEDICINE | Facility: CLINIC | Age: 34
End: 2019-07-26

## 2019-07-26 DIAGNOSIS — R06.83 SNORING: ICD-10-CM

## 2019-07-26 DIAGNOSIS — G47.8 SLEEP DYSFUNCTION WITH SLEEP STAGE DISTURBANCE: ICD-10-CM

## 2019-07-26 DIAGNOSIS — G47.10 HYPERSOMNIA: ICD-10-CM

## 2019-07-26 ASSESSMENT — MIFFLIN-ST. JEOR: SCORE: 1475.6

## 2019-07-30 ENCOUNTER — OFFICE VISIT - HEALTHEAST (OUTPATIENT)
Dept: OBGYN | Facility: CLINIC | Age: 34
End: 2019-07-30

## 2019-07-30 ENCOUNTER — HOSPITAL ENCOUNTER (OUTPATIENT)
Dept: ULTRASOUND IMAGING | Facility: HOSPITAL | Age: 34
Discharge: HOME | End: 2019-07-30
Attending: OBSTETRICS & GYNECOLOGY

## 2019-07-30 DIAGNOSIS — N92.1 MENORRHAGIA WITH IRREGULAR CYCLE: ICD-10-CM

## 2019-07-30 ASSESSMENT — MIFFLIN-ST. JEOR: SCORE: 1479.23

## 2019-09-21 ENCOUNTER — COMMUNICATION - HEALTHEAST (OUTPATIENT)
Dept: INTERNAL MEDICINE | Facility: CLINIC | Age: 34
End: 2019-09-21

## 2019-09-21 DIAGNOSIS — E03.9 HYPOTHYROIDISM, UNSPECIFIED TYPE: ICD-10-CM

## 2019-10-18 ENCOUNTER — OFFICE VISIT - HEALTHEAST (OUTPATIENT)
Dept: INTERNAL MEDICINE | Facility: CLINIC | Age: 34
End: 2019-10-18

## 2019-10-18 DIAGNOSIS — Z00.00 ROUTINE HISTORY AND PHYSICAL EXAMINATION OF ADULT: ICD-10-CM

## 2019-10-18 DIAGNOSIS — E67.3 HIGH VITAMIN D LEVEL: ICD-10-CM

## 2019-10-18 DIAGNOSIS — E03.9 HYPOTHYROIDISM, UNSPECIFIED TYPE: ICD-10-CM

## 2019-10-18 DIAGNOSIS — R76.8 ELEVATED ANTINUCLEAR ANTIBODY (ANA) LEVEL: ICD-10-CM

## 2019-10-18 DIAGNOSIS — F32.A DEPRESSION, UNSPECIFIED DEPRESSION TYPE: ICD-10-CM

## 2019-10-18 DIAGNOSIS — N94.6 MENSTRUAL PAIN: ICD-10-CM

## 2019-10-18 LAB
ALBUMIN SERPL-MCNC: 4 G/DL (ref 3.5–5)
ALBUMIN UR-MCNC: NEGATIVE MG/DL
ALP SERPL-CCNC: 58 U/L (ref 45–120)
ALT SERPL W P-5'-P-CCNC: 79 U/L (ref 0–45)
ANION GAP SERPL CALCULATED.3IONS-SCNC: 7 MMOL/L (ref 5–18)
APPEARANCE UR: CLEAR
AST SERPL W P-5'-P-CCNC: 43 U/L (ref 0–40)
BASOPHILS # BLD AUTO: 0 THOU/UL (ref 0–0.2)
BASOPHILS NFR BLD AUTO: 1 % (ref 0–2)
BILIRUB SERPL-MCNC: 0.7 MG/DL (ref 0–1)
BILIRUB UR QL STRIP: NEGATIVE
BUN SERPL-MCNC: 7 MG/DL (ref 8–22)
CALCIUM SERPL-MCNC: 9.1 MG/DL (ref 8.5–10.5)
CHLORIDE BLD-SCNC: 104 MMOL/L (ref 98–107)
CHOLEST SERPL-MCNC: 126 MG/DL
CO2 SERPL-SCNC: 28 MMOL/L (ref 22–31)
COLOR UR AUTO: YELLOW
CREAT SERPL-MCNC: 0.75 MG/DL (ref 0.6–1.1)
EOSINOPHIL # BLD AUTO: 0.1 THOU/UL (ref 0–0.4)
EOSINOPHIL NFR BLD AUTO: 2 % (ref 0–6)
ERYTHROCYTE [DISTWIDTH] IN BLOOD BY AUTOMATED COUNT: 10.6 % (ref 11–14.5)
FASTING STATUS PATIENT QL REPORTED: YES
GFR SERPL CREATININE-BSD FRML MDRD: >60 ML/MIN/1.73M2
GLUCOSE BLD-MCNC: 86 MG/DL (ref 70–125)
GLUCOSE UR STRIP-MCNC: NEGATIVE MG/DL
HBA1C MFR BLD: 5.1 % (ref 3.5–6)
HCT VFR BLD AUTO: 39.2 % (ref 35–47)
HDLC SERPL-MCNC: 47 MG/DL
HGB BLD-MCNC: 13.6 G/DL (ref 12–16)
HGB UR QL STRIP: NEGATIVE
KETONES UR STRIP-MCNC: NEGATIVE MG/DL
LDLC SERPL CALC-MCNC: 72 MG/DL
LEUKOCYTE ESTERASE UR QL STRIP: NEGATIVE
LYMPHOCYTES # BLD AUTO: 1.3 THOU/UL (ref 0.8–4.4)
LYMPHOCYTES NFR BLD AUTO: 40 % (ref 20–40)
MCH RBC QN AUTO: 32.1 PG (ref 27–34)
MCHC RBC AUTO-ENTMCNC: 34.7 G/DL (ref 32–36)
MCV RBC AUTO: 92 FL (ref 80–100)
MONOCYTES # BLD AUTO: 0.3 THOU/UL (ref 0–0.9)
MONOCYTES NFR BLD AUTO: 8 % (ref 2–10)
NEUTROPHILS # BLD AUTO: 1.6 THOU/UL (ref 2–7.7)
NEUTROPHILS NFR BLD AUTO: 49 % (ref 50–70)
NITRATE UR QL: NEGATIVE
PH UR STRIP: 7.5 [PH] (ref 5–8)
PLATELET # BLD AUTO: 128 THOU/UL (ref 140–440)
PMV BLD AUTO: 7.9 FL (ref 7–10)
POTASSIUM BLD-SCNC: 4 MMOL/L (ref 3.5–5)
PROT SERPL-MCNC: 7.6 G/DL (ref 6–8)
RBC # BLD AUTO: 4.24 MILL/UL (ref 3.8–5.4)
SODIUM SERPL-SCNC: 139 MMOL/L (ref 136–145)
SP GR UR STRIP: 1.01 (ref 1–1.03)
TRIGL SERPL-MCNC: 34 MG/DL
TSH SERPL DL<=0.005 MIU/L-ACNC: 0.43 UIU/ML (ref 0.3–5)
UROBILINOGEN UR STRIP-ACNC: NORMAL
WBC: 3.1 THOU/UL (ref 4–11)

## 2019-10-18 ASSESSMENT — ANXIETY QUESTIONNAIRES
6. BECOMING EASILY ANNOYED OR IRRITABLE: NOT AT ALL
3. WORRYING TOO MUCH ABOUT DIFFERENT THINGS: NEARLY EVERY DAY
2. NOT BEING ABLE TO STOP OR CONTROL WORRYING: SEVERAL DAYS
7. FEELING AFRAID AS IF SOMETHING AWFUL MIGHT HAPPEN: SEVERAL DAYS
GAD7 TOTAL SCORE: 7
4. TROUBLE RELAXING: SEVERAL DAYS
5. BEING SO RESTLESS THAT IT IS HARD TO SIT STILL: NOT AT ALL
IF YOU CHECKED OFF ANY PROBLEMS ON THIS QUESTIONNAIRE, HOW DIFFICULT HAVE THESE PROBLEMS MADE IT FOR YOU TO DO YOUR WORK, TAKE CARE OF THINGS AT HOME, OR GET ALONG WITH OTHER PEOPLE: SOMEWHAT DIFFICULT
1. FEELING NERVOUS, ANXIOUS, OR ON EDGE: SEVERAL DAYS

## 2019-10-18 ASSESSMENT — MIFFLIN-ST. JEOR: SCORE: 1484.67

## 2019-10-18 ASSESSMENT — PATIENT HEALTH QUESTIONNAIRE - PHQ9: SUM OF ALL RESPONSES TO PHQ QUESTIONS 1-9: 10

## 2019-10-21 LAB
25(OH)D3 SERPL-MCNC: 38.9 NG/ML (ref 30–80)
25(OH)D3 SERPL-MCNC: 38.9 NG/ML (ref 30–80)

## 2019-10-23 ENCOUNTER — AMBULATORY - HEALTHEAST (OUTPATIENT)
Dept: INTERNAL MEDICINE | Facility: CLINIC | Age: 34
End: 2019-10-23

## 2019-10-23 DIAGNOSIS — D72.819 LEUKOPENIA, UNSPECIFIED TYPE: ICD-10-CM

## 2019-11-08 ENCOUNTER — HEALTH MAINTENANCE LETTER (OUTPATIENT)
Age: 34
End: 2019-11-08

## 2019-11-18 ENCOUNTER — COMMUNICATION - HEALTHEAST (OUTPATIENT)
Dept: INTERNAL MEDICINE | Facility: CLINIC | Age: 34
End: 2019-11-18

## 2019-11-19 ENCOUNTER — AMBULATORY - HEALTHEAST (OUTPATIENT)
Dept: LAB | Facility: CLINIC | Age: 34
End: 2019-11-19

## 2019-11-19 DIAGNOSIS — D72.819 LEUKOPENIA, UNSPECIFIED TYPE: ICD-10-CM

## 2019-11-19 LAB
BASOPHILS # BLD AUTO: 0 THOU/UL (ref 0–0.2)
BASOPHILS NFR BLD AUTO: 0 % (ref 0–2)
EOSINOPHIL # BLD AUTO: 0.1 THOU/UL (ref 0–0.4)
EOSINOPHIL NFR BLD AUTO: 2 % (ref 0–6)
ERYTHROCYTE [DISTWIDTH] IN BLOOD BY AUTOMATED COUNT: 11.1 % (ref 11–14.5)
HCT VFR BLD AUTO: 40.9 % (ref 35–47)
HGB BLD-MCNC: 13.9 G/DL (ref 12–16)
LYMPHOCYTES # BLD AUTO: 2 THOU/UL (ref 0.8–4.4)
LYMPHOCYTES NFR BLD AUTO: 47 % (ref 20–40)
MCH RBC QN AUTO: 31.8 PG (ref 27–34)
MCHC RBC AUTO-ENTMCNC: 34.1 G/DL (ref 32–36)
MCV RBC AUTO: 93 FL (ref 80–100)
MONOCYTES # BLD AUTO: 0.3 THOU/UL (ref 0–0.9)
MONOCYTES NFR BLD AUTO: 7 % (ref 2–10)
NEUTROPHILS # BLD AUTO: 1.9 THOU/UL (ref 2–7.7)
NEUTROPHILS NFR BLD AUTO: 44 % (ref 50–70)
PLATELET # BLD AUTO: 141 THOU/UL (ref 140–440)
PMV BLD AUTO: 7.8 FL (ref 7–10)
RBC # BLD AUTO: 4.38 MILL/UL (ref 3.8–5.4)
WBC: 4.3 THOU/UL (ref 4–11)

## 2019-12-05 ENCOUNTER — OFFICE VISIT - HEALTHEAST (OUTPATIENT)
Dept: RHEUMATOLOGY | Facility: CLINIC | Age: 34
End: 2019-12-05

## 2019-12-05 ENCOUNTER — COMMUNICATION - HEALTHEAST (OUTPATIENT)
Dept: INTERNAL MEDICINE | Facility: CLINIC | Age: 34
End: 2019-12-05

## 2019-12-05 DIAGNOSIS — M79.10 MYALGIA: ICD-10-CM

## 2019-12-05 DIAGNOSIS — R76.8 ANA POSITIVE: ICD-10-CM

## 2019-12-05 LAB
ALT SERPL W P-5'-P-CCNC: 24 U/L (ref 0–45)
BASOPHILS # BLD AUTO: 0 THOU/UL (ref 0–0.2)
BASOPHILS NFR BLD AUTO: 0 % (ref 0–2)
C REACTIVE PROTEIN LHE: <0.1 MG/DL (ref 0–0.8)
C3 SERPL-MCNC: 107 MG/DL (ref 83–177)
C4 SERPL-MCNC: 23 MG/DL (ref 19–59)
CCP AB SER IA-ACNC: <0.5 U/ML
CREAT SERPL-MCNC: 0.73 MG/DL (ref 0.6–1.1)
EOSINOPHIL # BLD AUTO: 0.1 THOU/UL (ref 0–0.4)
EOSINOPHIL NFR BLD AUTO: 2 % (ref 0–6)
ERYTHROCYTE [DISTWIDTH] IN BLOOD BY AUTOMATED COUNT: 10.8 % (ref 11–14.5)
ERYTHROCYTE [SEDIMENTATION RATE] IN BLOOD BY WESTERGREN METHOD: 12 MM/HR (ref 0–20)
GFR SERPL CREATININE-BSD FRML MDRD: >60 ML/MIN/1.73M2
HCT VFR BLD AUTO: 39 % (ref 35–47)
HGB BLD-MCNC: 13.1 G/DL (ref 12–16)
LYMPHOCYTES # BLD AUTO: 1.3 THOU/UL (ref 0.8–4.4)
LYMPHOCYTES NFR BLD AUTO: 35 % (ref 20–40)
MCH RBC QN AUTO: 31 PG (ref 27–34)
MCHC RBC AUTO-ENTMCNC: 33.7 G/DL (ref 32–36)
MCV RBC AUTO: 92 FL (ref 80–100)
MONOCYTES # BLD AUTO: 0.3 THOU/UL (ref 0–0.9)
MONOCYTES NFR BLD AUTO: 9 % (ref 2–10)
NEUTROPHILS # BLD AUTO: 2.1 THOU/UL (ref 2–7.7)
NEUTROPHILS NFR BLD AUTO: 55 % (ref 50–70)
PLATELET # BLD AUTO: 153 THOU/UL (ref 140–440)
PMV BLD AUTO: 7.8 FL (ref 7–10)
RBC # BLD AUTO: 4.24 MILL/UL (ref 3.8–5.4)
RHEUMATOID FACT SERPL-ACNC: 36.9 IU/ML (ref 0–30)
WBC: 3.8 THOU/UL (ref 4–11)

## 2019-12-05 ASSESSMENT — MIFFLIN-ST. JEOR: SCORE: 1484.67

## 2019-12-06 ENCOUNTER — AMBULATORY - HEALTHEAST (OUTPATIENT)
Dept: INTERNAL MEDICINE | Facility: CLINIC | Age: 34
End: 2019-12-06

## 2019-12-06 DIAGNOSIS — F32.A DEPRESSION, UNSPECIFIED DEPRESSION TYPE: ICD-10-CM

## 2019-12-06 LAB — HCV AB SERPL QL IA: NEGATIVE

## 2019-12-09 LAB — ANA SER QL: 6.9 U

## 2019-12-10 LAB
JO-1 AUTOANTIBODIES - HISTORICAL: 0 EU
SCL-70 AUTOANTIBODIES - HISTORICAL: 0 EU
SM (SMITH AUTOANTIBODIES - HISTORICAL: 3 EU
SM/RNP AUTOANTIBODIES - HISTORICAL: 2 EU
SS-A/RO AUTOANTIBODIES - HISTORICAL: 150 EU
SS-B/LA AUTOANTIBODIES - HISTORICAL: 6 EU

## 2019-12-13 LAB — DNA (DS) ANTIBODY - HISTORICAL: 3 IU

## 2020-01-02 ENCOUNTER — OFFICE VISIT - HEALTHEAST (OUTPATIENT)
Dept: INTERNAL MEDICINE | Facility: CLINIC | Age: 35
End: 2020-01-02

## 2020-01-02 DIAGNOSIS — M54.42 ACUTE BILATERAL LOW BACK PAIN WITH LEFT-SIDED SCIATICA: ICD-10-CM

## 2020-01-02 ASSESSMENT — MIFFLIN-ST. JEOR: SCORE: 1530.03

## 2020-01-10 ENCOUNTER — HOSPITAL ENCOUNTER (OUTPATIENT)
Dept: MRI IMAGING | Facility: HOSPITAL | Age: 35
Discharge: HOME | End: 2020-01-10

## 2020-01-10 DIAGNOSIS — M54.42 ACUTE BILATERAL LOW BACK PAIN WITH LEFT-SIDED SCIATICA: ICD-10-CM

## 2020-01-21 ENCOUNTER — OFFICE VISIT - HEALTHEAST (OUTPATIENT)
Dept: INTERNAL MEDICINE | Facility: CLINIC | Age: 35
End: 2020-01-21

## 2020-01-21 DIAGNOSIS — M54.42 ACUTE BILATERAL LOW BACK PAIN WITH LEFT-SIDED SCIATICA: ICD-10-CM

## 2020-01-21 DIAGNOSIS — F32.A DEPRESSION, UNSPECIFIED DEPRESSION TYPE: ICD-10-CM

## 2020-01-21 ASSESSMENT — PATIENT HEALTH QUESTIONNAIRE - PHQ9: SUM OF ALL RESPONSES TO PHQ QUESTIONS 1-9: 14

## 2020-01-21 ASSESSMENT — ANXIETY QUESTIONNAIRES
7. FEELING AFRAID AS IF SOMETHING AWFUL MIGHT HAPPEN: NOT AT ALL
6. BECOMING EASILY ANNOYED OR IRRITABLE: MORE THAN HALF THE DAYS
2. NOT BEING ABLE TO STOP OR CONTROL WORRYING: SEVERAL DAYS
GAD7 TOTAL SCORE: 12
5. BEING SO RESTLESS THAT IT IS HARD TO SIT STILL: SEVERAL DAYS
IF YOU CHECKED OFF ANY PROBLEMS ON THIS QUESTIONNAIRE, HOW DIFFICULT HAVE THESE PROBLEMS MADE IT FOR YOU TO DO YOUR WORK, TAKE CARE OF THINGS AT HOME, OR GET ALONG WITH OTHER PEOPLE: SOMEWHAT DIFFICULT
1. FEELING NERVOUS, ANXIOUS, OR ON EDGE: NEARLY EVERY DAY
3. WORRYING TOO MUCH ABOUT DIFFERENT THINGS: MORE THAN HALF THE DAYS
4. TROUBLE RELAXING: NEARLY EVERY DAY

## 2020-01-21 ASSESSMENT — MIFFLIN-ST. JEOR: SCORE: 1530.03

## 2020-01-23 ENCOUNTER — OFFICE VISIT - HEALTHEAST (OUTPATIENT)
Dept: RHEUMATOLOGY | Facility: CLINIC | Age: 35
End: 2020-01-23

## 2020-01-23 DIAGNOSIS — R76.8 ANA POSITIVE: ICD-10-CM

## 2020-01-23 DIAGNOSIS — M25.50 POLYARTHRALGIA: ICD-10-CM

## 2020-01-23 DIAGNOSIS — M79.10 MYALGIA: ICD-10-CM

## 2020-01-23 DIAGNOSIS — D72.810 LYMPHOCYTOPENIA: ICD-10-CM

## 2020-01-23 ASSESSMENT — MIFFLIN-ST. JEOR: SCORE: 1484.67

## 2020-01-30 ENCOUNTER — OFFICE VISIT - HEALTHEAST (OUTPATIENT)
Dept: PHYSICAL THERAPY | Facility: REHABILITATION | Age: 35
End: 2020-01-30

## 2020-01-30 DIAGNOSIS — M54.50 ACUTE BILATERAL LOW BACK PAIN WITHOUT SCIATICA: ICD-10-CM

## 2020-01-30 DIAGNOSIS — M62.81 MUSCLE WEAKNESS (GENERALIZED): ICD-10-CM

## 2020-02-13 ENCOUNTER — OFFICE VISIT - HEALTHEAST (OUTPATIENT)
Dept: PHYSICAL THERAPY | Facility: REHABILITATION | Age: 35
End: 2020-02-13

## 2020-02-13 DIAGNOSIS — M62.81 MUSCLE WEAKNESS (GENERALIZED): ICD-10-CM

## 2020-02-13 DIAGNOSIS — M54.50 ACUTE BILATERAL LOW BACK PAIN WITHOUT SCIATICA: ICD-10-CM

## 2020-03-03 ENCOUNTER — OFFICE VISIT - HEALTHEAST (OUTPATIENT)
Dept: INTERNAL MEDICINE | Facility: CLINIC | Age: 35
End: 2020-03-03

## 2020-03-03 DIAGNOSIS — S23.9XXS THORACIC BACK SPRAIN, SEQUELA: ICD-10-CM

## 2020-03-03 DIAGNOSIS — R19.8 CHANGE IN BOWEL MOVEMENT: ICD-10-CM

## 2020-03-03 ASSESSMENT — MIFFLIN-ST. JEOR: SCORE: 1520.96

## 2020-03-04 ENCOUNTER — RECORDS - HEALTHEAST (OUTPATIENT)
Dept: ADMINISTRATIVE | Facility: OTHER | Age: 35
End: 2020-03-04

## 2020-03-05 ENCOUNTER — OFFICE VISIT - HEALTHEAST (OUTPATIENT)
Dept: PHYSICAL THERAPY | Facility: REHABILITATION | Age: 35
End: 2020-03-05

## 2020-03-05 DIAGNOSIS — M54.50 ACUTE BILATERAL LOW BACK PAIN WITHOUT SCIATICA: ICD-10-CM

## 2020-03-05 DIAGNOSIS — M62.81 MUSCLE WEAKNESS (GENERALIZED): ICD-10-CM

## 2020-03-13 ENCOUNTER — RECORDS - HEALTHEAST (OUTPATIENT)
Dept: ADMINISTRATIVE | Facility: OTHER | Age: 35
End: 2020-03-13

## 2020-05-01 ENCOUNTER — OFFICE VISIT - HEALTHEAST (OUTPATIENT)
Dept: INTERNAL MEDICINE | Facility: CLINIC | Age: 35
End: 2020-05-01

## 2020-05-01 DIAGNOSIS — M47.26 OSTEOARTHRITIS OF SPINE WITH RADICULOPATHY, LUMBAR REGION: ICD-10-CM

## 2020-05-11 ENCOUNTER — OFFICE VISIT - HEALTHEAST (OUTPATIENT)
Dept: NEUROSURGERY | Facility: CLINIC | Age: 35
End: 2020-05-11

## 2020-05-11 DIAGNOSIS — M54.41 CHRONIC BILATERAL LOW BACK PAIN WITH RIGHT-SIDED SCIATICA: ICD-10-CM

## 2020-05-11 DIAGNOSIS — G89.29 CHRONIC BILATERAL LOW BACK PAIN WITH RIGHT-SIDED SCIATICA: ICD-10-CM

## 2020-05-11 ASSESSMENT — MIFFLIN-ST. JEOR: SCORE: 1502.82

## 2020-05-22 ENCOUNTER — HOSPITAL ENCOUNTER (OUTPATIENT)
Dept: PHYSICAL MEDICINE AND REHAB | Facility: CLINIC | Age: 35
Discharge: HOME | End: 2020-05-22
Attending: SURGERY

## 2020-05-22 DIAGNOSIS — M25.50 POLYARTHRALGIA: ICD-10-CM

## 2020-05-22 DIAGNOSIS — M79.604 PAIN OF RIGHT LOWER EXTREMITY: ICD-10-CM

## 2020-05-22 DIAGNOSIS — D72.810 LYMPHOCYTOPENIA: ICD-10-CM

## 2020-05-22 DIAGNOSIS — R76.8 ANA POSITIVE: ICD-10-CM

## 2020-05-22 DIAGNOSIS — M79.18 MYOFASCIAL PAIN: ICD-10-CM

## 2020-05-22 DIAGNOSIS — M47.817 LUMBOSACRAL SPONDYLOSIS WITHOUT MYELOPATHY: ICD-10-CM

## 2020-05-22 DIAGNOSIS — M79.10 MYALGIA: ICD-10-CM

## 2020-05-27 ENCOUNTER — RECORDS - HEALTHEAST (OUTPATIENT)
Dept: ADMINISTRATIVE | Facility: OTHER | Age: 35
End: 2020-05-27

## 2020-05-29 ENCOUNTER — HOSPITAL ENCOUNTER (OUTPATIENT)
Dept: PHYSICAL MEDICINE AND REHAB | Facility: CLINIC | Age: 35
Discharge: HOME | End: 2020-05-29
Attending: PAIN MEDICINE

## 2020-05-29 DIAGNOSIS — M25.50 POLYARTHRALGIA: ICD-10-CM

## 2020-05-29 DIAGNOSIS — D72.810 LYMPHOCYTOPENIA: ICD-10-CM

## 2020-05-29 DIAGNOSIS — M99.08 SOMATIC DYSFUNCTION OF RIB CAGE REGION: ICD-10-CM

## 2020-05-29 DIAGNOSIS — M99.05 SOMATIC DYSFUNCTION OF PELVIC REGION: ICD-10-CM

## 2020-05-29 DIAGNOSIS — R76.8 ANA POSITIVE: ICD-10-CM

## 2020-05-29 DIAGNOSIS — M99.02 SOMATIC DYSFUNCTION OF SPINE, THORACIC: ICD-10-CM

## 2020-05-29 DIAGNOSIS — M79.10 MYALGIA: ICD-10-CM

## 2020-05-29 DIAGNOSIS — M47.817 LUMBOSACRAL SPONDYLOSIS WITHOUT MYELOPATHY: ICD-10-CM

## 2020-05-29 DIAGNOSIS — M79.604 PAIN OF RIGHT LOWER EXTREMITY: ICD-10-CM

## 2020-05-29 DIAGNOSIS — M99.04 SOMATIC DYSFUNCTION OF SACRAL REGION: ICD-10-CM

## 2020-05-29 DIAGNOSIS — M79.18 MYOFASCIAL PAIN: ICD-10-CM

## 2020-05-29 DIAGNOSIS — M99.00 SOMATIC DYSFUNCTION OF HEAD REGION: ICD-10-CM

## 2020-05-29 DIAGNOSIS — M99.01 CERVICAL (NECK) REGION SOMATIC DYSFUNCTION: ICD-10-CM

## 2020-05-29 DIAGNOSIS — M99.03 SOMATIC DYSFUNCTION OF SPINE, LUMBAR: ICD-10-CM

## 2020-06-01 ENCOUNTER — OFFICE VISIT - HEALTHEAST (OUTPATIENT)
Dept: PHYSICAL THERAPY | Facility: CLINIC | Age: 35
End: 2020-06-01

## 2020-06-01 ENCOUNTER — COMMUNICATION - HEALTHEAST (OUTPATIENT)
Dept: INTERNAL MEDICINE | Facility: CLINIC | Age: 35
End: 2020-06-01

## 2020-06-01 DIAGNOSIS — F32.A DEPRESSION, UNSPECIFIED DEPRESSION TYPE: ICD-10-CM

## 2020-06-01 DIAGNOSIS — M62.81 MUSCLE WEAKNESS (GENERALIZED): ICD-10-CM

## 2020-06-01 DIAGNOSIS — M54.50 ACUTE BILATERAL LOW BACK PAIN WITHOUT SCIATICA: ICD-10-CM

## 2020-06-04 ENCOUNTER — OFFICE VISIT - HEALTHEAST (OUTPATIENT)
Dept: PHYSICAL THERAPY | Facility: CLINIC | Age: 35
End: 2020-06-04

## 2020-06-04 DIAGNOSIS — M62.81 MUSCLE WEAKNESS (GENERALIZED): ICD-10-CM

## 2020-06-04 DIAGNOSIS — M54.50 ACUTE BILATERAL LOW BACK PAIN WITHOUT SCIATICA: ICD-10-CM

## 2020-06-08 ENCOUNTER — OFFICE VISIT - HEALTHEAST (OUTPATIENT)
Dept: PHYSICAL THERAPY | Facility: CLINIC | Age: 35
End: 2020-06-08

## 2020-06-08 DIAGNOSIS — M54.50 ACUTE BILATERAL LOW BACK PAIN WITHOUT SCIATICA: ICD-10-CM

## 2020-06-08 DIAGNOSIS — M62.81 MUSCLE WEAKNESS (GENERALIZED): ICD-10-CM

## 2020-06-12 ENCOUNTER — HOSPITAL ENCOUNTER (OUTPATIENT)
Dept: PHYSICAL MEDICINE AND REHAB | Facility: CLINIC | Age: 35
Discharge: HOME | End: 2020-06-12
Attending: PAIN MEDICINE

## 2020-06-12 DIAGNOSIS — M99.01 CERVICAL (NECK) REGION SOMATIC DYSFUNCTION: ICD-10-CM

## 2020-06-12 DIAGNOSIS — M99.08 SOMATIC DYSFUNCTION OF RIB CAGE REGION: ICD-10-CM

## 2020-06-12 DIAGNOSIS — M25.50 POLYARTHRALGIA: ICD-10-CM

## 2020-06-12 DIAGNOSIS — M99.02 SOMATIC DYSFUNCTION OF SPINE, THORACIC: ICD-10-CM

## 2020-06-12 DIAGNOSIS — M79.10 MYALGIA: ICD-10-CM

## 2020-06-12 DIAGNOSIS — D72.810 LYMPHOCYTOPENIA: ICD-10-CM

## 2020-06-12 DIAGNOSIS — M99.04 SOMATIC DYSFUNCTION OF SACRAL REGION: ICD-10-CM

## 2020-06-12 DIAGNOSIS — M47.817 LUMBOSACRAL SPONDYLOSIS WITHOUT MYELOPATHY: ICD-10-CM

## 2020-06-12 DIAGNOSIS — M79.18 MYOFASCIAL PAIN: ICD-10-CM

## 2020-06-12 DIAGNOSIS — M99.05 SOMATIC DYSFUNCTION OF PELVIC REGION: ICD-10-CM

## 2020-06-12 DIAGNOSIS — R76.8 ANA POSITIVE: ICD-10-CM

## 2020-06-12 DIAGNOSIS — M99.00 SOMATIC DYSFUNCTION OF HEAD REGION: ICD-10-CM

## 2020-06-12 DIAGNOSIS — M99.03 SOMATIC DYSFUNCTION OF SPINE, LUMBAR: ICD-10-CM

## 2020-06-12 DIAGNOSIS — M79.604 PAIN OF RIGHT LOWER EXTREMITY: ICD-10-CM

## 2020-06-12 ASSESSMENT — MIFFLIN-ST. JEOR: SCORE: 1502.82

## 2020-06-15 ENCOUNTER — OFFICE VISIT - HEALTHEAST (OUTPATIENT)
Dept: PHYSICAL THERAPY | Facility: CLINIC | Age: 35
End: 2020-06-15

## 2020-06-15 DIAGNOSIS — M62.81 MUSCLE WEAKNESS (GENERALIZED): ICD-10-CM

## 2020-06-15 DIAGNOSIS — M54.50 ACUTE BILATERAL LOW BACK PAIN WITHOUT SCIATICA: ICD-10-CM

## 2020-06-17 ENCOUNTER — OFFICE VISIT - HEALTHEAST (OUTPATIENT)
Dept: PHYSICAL THERAPY | Facility: CLINIC | Age: 35
End: 2020-06-17

## 2020-06-17 DIAGNOSIS — M54.50 ACUTE BILATERAL LOW BACK PAIN WITHOUT SCIATICA: ICD-10-CM

## 2020-06-17 DIAGNOSIS — M62.81 MUSCLE WEAKNESS (GENERALIZED): ICD-10-CM

## 2020-06-22 ENCOUNTER — RECORDS - HEALTHEAST (OUTPATIENT)
Dept: ADMINISTRATIVE | Facility: OTHER | Age: 35
End: 2020-06-22

## 2020-06-25 ENCOUNTER — COMMUNICATION - HEALTHEAST (OUTPATIENT)
Dept: INTERNAL MEDICINE | Facility: CLINIC | Age: 35
End: 2020-06-25

## 2020-06-26 ENCOUNTER — OFFICE VISIT - HEALTHEAST (OUTPATIENT)
Dept: INTERNAL MEDICINE | Facility: CLINIC | Age: 35
End: 2020-06-26

## 2020-06-26 ENCOUNTER — COMMUNICATION - HEALTHEAST (OUTPATIENT)
Dept: INTERNAL MEDICINE | Facility: CLINIC | Age: 35
End: 2020-06-26

## 2020-06-26 DIAGNOSIS — L08.9 LOCAL INFECTION OF SKIN AND SUBCUTANEOUS TISSUE: ICD-10-CM

## 2020-06-26 DIAGNOSIS — R06.2 WHEEZE: ICD-10-CM

## 2020-06-26 DIAGNOSIS — M47.26 OSTEOARTHRITIS OF SPINE WITH RADICULOPATHY, LUMBAR REGION: ICD-10-CM

## 2020-06-26 DIAGNOSIS — F32.A DEPRESSION, UNSPECIFIED DEPRESSION TYPE: ICD-10-CM

## 2020-06-26 ASSESSMENT — ANXIETY QUESTIONNAIRES
2. NOT BEING ABLE TO STOP OR CONTROL WORRYING: NOT AT ALL
6. BECOMING EASILY ANNOYED OR IRRITABLE: SEVERAL DAYS
IF YOU CHECKED OFF ANY PROBLEMS ON THIS QUESTIONNAIRE, HOW DIFFICULT HAVE THESE PROBLEMS MADE IT FOR YOU TO DO YOUR WORK, TAKE CARE OF THINGS AT HOME, OR GET ALONG WITH OTHER PEOPLE: SOMEWHAT DIFFICULT
4. TROUBLE RELAXING: NEARLY EVERY DAY
7. FEELING AFRAID AS IF SOMETHING AWFUL MIGHT HAPPEN: NOT AT ALL
3. WORRYING TOO MUCH ABOUT DIFFERENT THINGS: MORE THAN HALF THE DAYS
5. BEING SO RESTLESS THAT IT IS HARD TO SIT STILL: NEARLY EVERY DAY
GAD7 TOTAL SCORE: 10
1. FEELING NERVOUS, ANXIOUS, OR ON EDGE: SEVERAL DAYS

## 2020-06-26 ASSESSMENT — PATIENT HEALTH QUESTIONNAIRE - PHQ9: SUM OF ALL RESPONSES TO PHQ QUESTIONS 1-9: 11

## 2020-06-26 ASSESSMENT — MIFFLIN-ST. JEOR: SCORE: 1475.6

## 2020-06-29 ENCOUNTER — AMBULATORY - HEALTHEAST (OUTPATIENT)
Dept: INTERNAL MEDICINE | Facility: CLINIC | Age: 35
End: 2020-06-29

## 2020-06-29 ENCOUNTER — OFFICE VISIT - HEALTHEAST (OUTPATIENT)
Dept: PHYSICAL THERAPY | Facility: CLINIC | Age: 35
End: 2020-06-29

## 2020-06-29 DIAGNOSIS — M54.50 ACUTE BILATERAL LOW BACK PAIN WITHOUT SCIATICA: ICD-10-CM

## 2020-06-29 DIAGNOSIS — M62.81 MUSCLE WEAKNESS (GENERALIZED): ICD-10-CM

## 2020-06-29 DIAGNOSIS — B37.31 YEAST INFECTION OF THE VAGINA: ICD-10-CM

## 2020-07-01 ENCOUNTER — OFFICE VISIT - HEALTHEAST (OUTPATIENT)
Dept: INTERNAL MEDICINE | Facility: CLINIC | Age: 35
End: 2020-07-01

## 2020-07-01 DIAGNOSIS — R06.2 WHEEZE: ICD-10-CM

## 2020-07-01 DIAGNOSIS — L08.9 LOCAL INFECTION OF SKIN AND SUBCUTANEOUS TISSUE: ICD-10-CM

## 2020-07-06 ENCOUNTER — OFFICE VISIT - HEALTHEAST (OUTPATIENT)
Dept: PHYSICAL THERAPY | Facility: CLINIC | Age: 35
End: 2020-07-06

## 2020-07-06 DIAGNOSIS — M54.50 ACUTE BILATERAL LOW BACK PAIN WITHOUT SCIATICA: ICD-10-CM

## 2020-07-06 DIAGNOSIS — M62.81 MUSCLE WEAKNESS (GENERALIZED): ICD-10-CM

## 2020-07-15 ENCOUNTER — HOSPITAL ENCOUNTER (OUTPATIENT)
Dept: PHYSICAL MEDICINE AND REHAB | Facility: CLINIC | Age: 35
Discharge: HOME | End: 2020-07-15
Attending: PAIN MEDICINE

## 2020-07-15 DIAGNOSIS — M99.04 SOMATIC DYSFUNCTION OF SACRAL REGION: ICD-10-CM

## 2020-07-15 DIAGNOSIS — M99.08 SOMATIC DYSFUNCTION OF RIB CAGE REGION: ICD-10-CM

## 2020-07-15 DIAGNOSIS — M99.02 SOMATIC DYSFUNCTION OF SPINE, THORACIC: ICD-10-CM

## 2020-07-15 DIAGNOSIS — M99.03 SOMATIC DYSFUNCTION OF SPINE, LUMBAR: ICD-10-CM

## 2020-07-15 DIAGNOSIS — M47.817 LUMBOSACRAL SPONDYLOSIS WITHOUT MYELOPATHY: ICD-10-CM

## 2020-07-15 DIAGNOSIS — M99.00 SOMATIC DYSFUNCTION OF HEAD REGION: ICD-10-CM

## 2020-07-15 DIAGNOSIS — R76.8 ANA POSITIVE: ICD-10-CM

## 2020-07-15 DIAGNOSIS — M99.01 CERVICAL (NECK) REGION SOMATIC DYSFUNCTION: ICD-10-CM

## 2020-07-15 DIAGNOSIS — M25.50 POLYARTHRALGIA: ICD-10-CM

## 2020-07-15 DIAGNOSIS — M79.18 MYOFASCIAL PAIN: ICD-10-CM

## 2020-07-15 DIAGNOSIS — M99.05 SOMATIC DYSFUNCTION OF PELVIC REGION: ICD-10-CM

## 2020-07-15 ASSESSMENT — MIFFLIN-ST. JEOR: SCORE: 1475.6

## 2020-07-24 ENCOUNTER — HOSPITAL ENCOUNTER (OUTPATIENT)
Dept: PHYSICAL MEDICINE AND REHAB | Facility: CLINIC | Age: 35
Discharge: HOME | End: 2020-07-24
Attending: PAIN MEDICINE

## 2020-07-24 DIAGNOSIS — M79.18 MYOFASCIAL PAIN: ICD-10-CM

## 2020-07-24 DIAGNOSIS — M47.817 LUMBOSACRAL SPONDYLOSIS WITHOUT MYELOPATHY: ICD-10-CM

## 2020-08-12 ENCOUNTER — HOSPITAL ENCOUNTER (OUTPATIENT)
Dept: PHYSICAL MEDICINE AND REHAB | Facility: CLINIC | Age: 35
Discharge: HOME | End: 2020-08-12
Attending: PAIN MEDICINE

## 2020-08-12 DIAGNOSIS — M79.18 MYOFASCIAL PAIN: ICD-10-CM

## 2020-08-12 DIAGNOSIS — M47.817 LUMBOSACRAL SPONDYLOSIS WITHOUT MYELOPATHY: ICD-10-CM

## 2020-08-12 DIAGNOSIS — M25.50 POLYARTHRALGIA: ICD-10-CM

## 2020-08-12 DIAGNOSIS — R76.8 ANA POSITIVE: ICD-10-CM

## 2020-08-14 ENCOUNTER — RECORDS - HEALTHEAST (OUTPATIENT)
Dept: ADMINISTRATIVE | Facility: OTHER | Age: 35
End: 2020-08-14

## 2020-08-25 ENCOUNTER — COMMUNICATION - HEALTHEAST (OUTPATIENT)
Dept: INTERNAL MEDICINE | Facility: CLINIC | Age: 35
End: 2020-08-25

## 2020-08-25 ENCOUNTER — OFFICE VISIT - HEALTHEAST (OUTPATIENT)
Dept: OBGYN | Facility: CLINIC | Age: 35
End: 2020-08-25

## 2020-08-25 ENCOUNTER — AMBULATORY - HEALTHEAST (OUTPATIENT)
Dept: INTERNAL MEDICINE | Facility: CLINIC | Age: 35
End: 2020-08-25

## 2020-08-25 DIAGNOSIS — E03.9 HYPOTHYROIDISM, UNSPECIFIED TYPE: ICD-10-CM

## 2020-08-25 DIAGNOSIS — N92.1 MENORRHAGIA WITH IRREGULAR CYCLE: ICD-10-CM

## 2020-08-25 DIAGNOSIS — F32.A DEPRESSION, UNSPECIFIED DEPRESSION TYPE: ICD-10-CM

## 2020-08-25 DIAGNOSIS — F50.819 BINGE EATING DISORDER: ICD-10-CM

## 2020-08-25 DIAGNOSIS — F43.10 PTSD (POST-TRAUMATIC STRESS DISORDER): ICD-10-CM

## 2020-08-28 ENCOUNTER — HOSPITAL ENCOUNTER (OUTPATIENT)
Dept: PHYSICAL MEDICINE AND REHAB | Facility: CLINIC | Age: 35
Discharge: HOME | End: 2020-08-28
Attending: PAIN MEDICINE

## 2020-08-28 ENCOUNTER — COMMUNICATION - HEALTHEAST (OUTPATIENT)
Dept: PHYSICAL MEDICINE AND REHAB | Facility: CLINIC | Age: 35
End: 2020-08-28

## 2020-08-28 DIAGNOSIS — M47.817 LUMBOSACRAL SPONDYLOSIS WITHOUT MYELOPATHY: ICD-10-CM

## 2020-08-28 DIAGNOSIS — M79.18 MYOFASCIAL PAIN: ICD-10-CM

## 2020-09-02 ENCOUNTER — COMMUNICATION - HEALTHEAST (OUTPATIENT)
Dept: PHYSICAL MEDICINE AND REHAB | Facility: CLINIC | Age: 35
End: 2020-09-02

## 2020-09-02 ENCOUNTER — OFFICE VISIT - HEALTHEAST (OUTPATIENT)
Dept: BEHAVIORAL HEALTH | Facility: CLINIC | Age: 35
End: 2020-09-02

## 2020-09-02 DIAGNOSIS — F32.A DEPRESSION, UNSPECIFIED DEPRESSION TYPE: ICD-10-CM

## 2020-09-02 DIAGNOSIS — F41.1 GAD (GENERALIZED ANXIETY DISORDER): ICD-10-CM

## 2020-09-03 ASSESSMENT — PATIENT HEALTH QUESTIONNAIRE - PHQ9: SUM OF ALL RESPONSES TO PHQ QUESTIONS 1-9: 11

## 2020-09-03 ASSESSMENT — ANXIETY QUESTIONNAIRES
6. BECOMING EASILY ANNOYED OR IRRITABLE: MORE THAN HALF THE DAYS
7. FEELING AFRAID AS IF SOMETHING AWFUL MIGHT HAPPEN: MORE THAN HALF THE DAYS
5. BEING SO RESTLESS THAT IT IS HARD TO SIT STILL: NEARLY EVERY DAY
1. FEELING NERVOUS, ANXIOUS, OR ON EDGE: NEARLY EVERY DAY
IF YOU CHECKED OFF ANY PROBLEMS ON THIS QUESTIONNAIRE, HOW DIFFICULT HAVE THESE PROBLEMS MADE IT FOR YOU TO DO YOUR WORK, TAKE CARE OF THINGS AT HOME, OR GET ALONG WITH OTHER PEOPLE: VERY DIFFICULT
4. TROUBLE RELAXING: NEARLY EVERY DAY
2. NOT BEING ABLE TO STOP OR CONTROL WORRYING: NEARLY EVERY DAY
GAD7 TOTAL SCORE: 19
3. WORRYING TOO MUCH ABOUT DIFFERENT THINGS: NEARLY EVERY DAY

## 2020-09-04 ENCOUNTER — OFFICE VISIT - HEALTHEAST (OUTPATIENT)
Dept: FAMILY MEDICINE | Facility: CLINIC | Age: 35
End: 2020-09-04

## 2020-09-04 ENCOUNTER — AMBULATORY - HEALTHEAST (OUTPATIENT)
Dept: PHYSICAL MEDICINE AND REHAB | Facility: CLINIC | Age: 35
End: 2020-09-04

## 2020-09-04 ENCOUNTER — COMMUNICATION - HEALTHEAST (OUTPATIENT)
Dept: SCHEDULING | Facility: CLINIC | Age: 35
End: 2020-09-04

## 2020-09-04 DIAGNOSIS — M79.18 MYOFASCIAL PAIN: ICD-10-CM

## 2020-09-04 DIAGNOSIS — M47.817 LUMBOSACRAL SPONDYLOSIS WITHOUT MYELOPATHY: ICD-10-CM

## 2020-09-04 DIAGNOSIS — H10.32 ACUTE BACTERIAL CONJUNCTIVITIS OF LEFT EYE: ICD-10-CM

## 2020-09-04 DIAGNOSIS — M25.50 POLYARTHRALGIA: ICD-10-CM

## 2020-09-04 DIAGNOSIS — R76.8 ANA POSITIVE: ICD-10-CM

## 2020-09-08 ENCOUNTER — COMMUNICATION - HEALTHEAST (OUTPATIENT)
Dept: INTERNAL MEDICINE | Facility: CLINIC | Age: 35
End: 2020-09-08

## 2020-09-08 DIAGNOSIS — M47.26 OSTEOARTHRITIS OF SPINE WITH RADICULOPATHY, LUMBAR REGION: ICD-10-CM

## 2020-09-09 ENCOUNTER — COMMUNICATION - HEALTHEAST (OUTPATIENT)
Dept: ADMINISTRATIVE | Facility: CLINIC | Age: 35
End: 2020-09-09

## 2020-09-09 ENCOUNTER — OFFICE VISIT (OUTPATIENT)
Dept: OPTOMETRY | Facility: CLINIC | Age: 35
End: 2020-09-09
Payer: COMMERCIAL

## 2020-09-09 DIAGNOSIS — H04.129 DRY EYE: ICD-10-CM

## 2020-09-09 DIAGNOSIS — H10.13 ALLERGIC CONJUNCTIVITIS, BILATERAL: Primary | ICD-10-CM

## 2020-09-09 PROCEDURE — 99203 OFFICE O/P NEW LOW 30 MIN: CPT | Performed by: OPTOMETRIST

## 2020-09-09 RX ORDER — PREDNISOLONE ACETATE 10 MG/ML
1 SUSPENSION/ DROPS OPHTHALMIC 3 TIMES DAILY
Qty: 1 BOTTLE | Refills: 0 | Status: SHIPPED | OUTPATIENT
Start: 2020-09-09 | End: 2020-09-16

## 2020-09-09 RX ORDER — ACETAMINOPHEN AND CODEINE PHOSPHATE 120; 12 MG/5ML; MG/5ML
0.35 SOLUTION ORAL
COMMUNITY
Start: 2020-08-25 | End: 2021-11-20

## 2020-09-09 RX ORDER — ALBUTEROL SULFATE 90 UG/1
2 AEROSOL, METERED RESPIRATORY (INHALATION)
COMMUNITY
Start: 2020-06-26 | End: 2021-09-01

## 2020-09-09 RX ORDER — ACETAMINOPHEN 500 MG
1000 TABLET ORAL
COMMUNITY

## 2020-09-09 RX ORDER — LEVOTHYROXINE SODIUM 75 UG/1
TABLET ORAL
COMMUNITY
Start: 2018-09-25 | End: 2021-11-30

## 2020-09-09 RX ORDER — GABAPENTIN 600 MG/1
TABLET ORAL
COMMUNITY
Start: 2020-05-11 | End: 2021-09-01 | Stop reason: ALTCHOICE

## 2020-09-09 RX ORDER — AMITRIPTYLINE HYDROCHLORIDE 10 MG/1
10 TABLET ORAL
COMMUNITY
Start: 2020-08-14 | End: 2022-04-06

## 2020-09-09 ASSESSMENT — CUP TO DISC RATIO
OD_RATIO: 0.15
OS_RATIO: 0.15

## 2020-09-09 ASSESSMENT — EXTERNAL EXAM - RIGHT EYE: OD_EXAM: NORMAL

## 2020-09-09 ASSESSMENT — VISUAL ACUITY
OD_CC: 20/20
OD_SC+: -
OD_SC: 20/50
METHOD: SNELLEN - LINEAR
OS_CC: 20/20

## 2020-09-09 ASSESSMENT — EXTERNAL EXAM - LEFT EYE: OS_EXAM: NORMAL

## 2020-09-09 NOTE — LETTER
9/9/2020         RE: Katiuska De Jesus  [address] [place] [address]        Dear Colleague,    Thank you for referring your patient, Katiuska De Jesus, to the [facility]. Please see a copy of my visit note below.    Chief Complaint   Patient presents with     Eye Problem Both Eyes       Do you wear contact lenses? No    HPI    Eye Problem Both Eyes      In both eyes. Onset was gradual. This started 1 week ago. Severity is moderate. Occurring constantly. It is worse throughout the day. Associated symptoms include eye pain, redness, tearing, pain with eye movement, photophobia, previous episodes, itching, discharge, and swelling. Treatments tried include artificial tears, ointment, and eye drops. Response to treatment was no improvement. Pain was noted as 8/10.   Comments      Previous episode in 2009: was told she could possibly have MS. She did not pursue it further due to financial and insurance reasons. Reports it took about a month for it to clear up at that time.   09/03: went to [facility]. Rx'ed polymyxin, she reports the drops didn't help. Paula feeling in eyes, made them feel dryer. Had yellow discharge that turned light pink after this. She reports at this point her eyes swelled up to the point of barely beiing able to open them.   9/04: went to walk in clinic: discharge use of gtts, started erithromycin kellen. She reports she was able to tolerate this a little better, but her eyes got more red and the discharge was non stop, plus watery eyes. She reports the last time she put it in, she was in a huge amount of pain and itching and more swelling.   She stopped using the kellen Monday night. She had to rinse it out.             CPO DANNY John  Has allergies  LETICIA positive no other inflammatory markers   Hypothyroid        Medical, surgical and family histories reviewed and updated 9/9/2020.       Used polytrim drops which made things worse  Ointment helped some  Used an over the  counter allergy drop at the beginning, target brand   Has not used any lubricant   Headache / only wears prescription to drive  Vision is poor without them    OBJECTIVE: See Ophthalmology exam    ASSESSMENT:    ICD-10-CM    1. Allergic conjunctivitis, bilateral  H10.13 prednisoLONE acetate (PRED FORTE) 1 % ophthalmic suspension   2. Dry eye  H04.129     likely had a rxn to polytrim prescription drops  Negative for any iritis or posterior involvement      PLAN:  PF three times daily for one week  Artificial tears bid    Patria Gutierrez OD     Again, thank you for allowing me to participate in the care of your patient.        Sincerely,        Patria Gutierrez, OD

## 2020-09-09 NOTE — PATIENT INSTRUCTIONS
Use prescription drops after shaking well three times daily each eye , if improving you can cut to two times daily after a few days for the remaining duration and then discontinue   Long term side effects of these drops are cataracts and glaucoma , so you should not self medicate or use long term    I would add a preservative free artifical tear two times daily     Do not use any red eye relief product such as visine or clear eyes

## 2020-09-09 NOTE — PROGRESS NOTES
Chief Complaint   Patient presents with     Eye Problem Both Eyes       Do you wear contact lenses? No    HPI    Eye Problem Both Eyes      In both eyes. Onset was gradual. This started 1 week ago. Severity is moderate. Occurring constantly. It is worse throughout the day. Associated symptoms include eye pain, redness, tearing, pain with eye movement, photophobia, previous episodes, itching, discharge, and swelling. Treatments tried include artificial tears, ointment, and eye drops. Response to treatment was no improvement. Pain was noted as 8/10.   Comments      Previous episode in 2009: was told she could possibly have MS. She did not pursue it further due to financial and insurance reasons. Reports it took about a month for it to clear up at that time.   09/03: [facility] to [facility]. Rx'ed polymyxin, she reports the drops didn't help. Paula feeling in eyes, made them feel dryer. Had yellow discharge that turned light pink after this. She reports at this point her eyes swelled up to the point of barely beiing able to open them.   9/04: went to walk in clinic: discharge use of gtts, started erithromycin kellen. She reports she was able to tolerate this a little better, but her eyes got more red and the discharge was non stop, plus watery eyes. She reports the last time she put it in, she was in a huge amount of pain and itching and more swelling.   She stopped using the kellen Monday night. She had to rinse it out.             Lissa Barrera CPO    ROS  Has allergies  LETICIA positive no other inflammatory markers   Hypothyroid        Medical, surgical and family histories reviewed and updated 9/9/2020.       Used polytrim drops which made things worse  Ointment helped some  Used an over the counter allergy drop at the beginning, target brand   Has not used any lubricant   Headache / only wears prescription to drive  Vision is poor without them    OBJECTIVE: See Ophthalmology exam    ASSESSMENT:    ICD-10-CM    1. Allergic  conjunctivitis, bilateral  H10.13 prednisoLONE acetate (PRED FORTE) 1 % ophthalmic suspension   2. Dry eye  H04.129     likely had a rxn to polytrim prescription drops  Negative for any iritis or posterior involvement      PLAN:  PF three times daily for one week  Artificial tears bid    Patria Gutierrez OD

## 2020-09-15 ENCOUNTER — COMMUNICATION - HEALTHEAST (OUTPATIENT)
Dept: INTERNAL MEDICINE | Facility: CLINIC | Age: 35
End: 2020-09-15

## 2020-09-15 ENCOUNTER — COMMUNICATION - HEALTHEAST (OUTPATIENT)
Dept: OBGYN | Facility: CLINIC | Age: 35
End: 2020-09-15

## 2020-09-15 DIAGNOSIS — N92.1 MENORRHAGIA WITH IRREGULAR CYCLE: ICD-10-CM

## 2020-09-25 ENCOUNTER — OFFICE VISIT (OUTPATIENT)
Dept: NEUROLOGY | Facility: CLINIC | Age: 35
End: 2020-09-25
Payer: COMMERCIAL

## 2020-09-25 ENCOUNTER — RECORDS - HEALTHEAST (OUTPATIENT)
Dept: ADMINISTRATIVE | Facility: OTHER | Age: 35
End: 2020-09-25

## 2020-09-25 ENCOUNTER — TELEPHONE (OUTPATIENT)
Dept: NEUROLOGY | Facility: CLINIC | Age: 35
End: 2020-09-25

## 2020-09-25 VITALS
HEIGHT: 65 IN | HEART RATE: 85 BPM | DIASTOLIC BLOOD PRESSURE: 64 MMHG | BODY MASS INDEX: 29.99 KG/M2 | SYSTOLIC BLOOD PRESSURE: 106 MMHG | WEIGHT: 180 LBS

## 2020-09-25 DIAGNOSIS — R29.898 LEG WEAKNESS, BILATERAL: ICD-10-CM

## 2020-09-25 DIAGNOSIS — H53.143 PHOTOPHOBIA OF BOTH EYES: ICD-10-CM

## 2020-09-25 DIAGNOSIS — H57.13 EYE PAIN, BILATERAL: Primary | ICD-10-CM

## 2020-09-25 DIAGNOSIS — Z82.0 FAMILY HISTORY OF MS (MULTIPLE SCLEROSIS): ICD-10-CM

## 2020-09-25 PROCEDURE — 99205 OFFICE O/P NEW HI 60 MIN: CPT | Performed by: PSYCHIATRY & NEUROLOGY

## 2020-09-25 RX ORDER — LORAZEPAM 1 MG/1
TABLET ORAL
Qty: 2 TABLET | Refills: 0 | Status: SHIPPED | OUTPATIENT
Start: 2020-09-25 | End: 2021-09-01 | Stop reason: ALTCHOICE

## 2020-09-25 ASSESSMENT — MIFFLIN-ST. JEOR: SCORE: 1512.35

## 2020-09-25 NOTE — TELEPHONE ENCOUNTER
Confirmed with Dr. English. Directions are to be taken 30 minutes prior to scan and that she will need a ride. Pharmacist notified   Dion Hdz CMA on 9/25/2020 at 12:56 PM

## 2020-09-25 NOTE — PROGRESS NOTES
[facility] Neurology  [place]    Katiuska De Jesus MRN# [id number]   Age: 34 year old YOB: 1985               Assessment and Plan:   Assessment:   Episodes of bilateral eye pain  Single episode of bilateral leg weakness  Bilateral photophobia  Family history of multiple sclerosis (brother)        Plan:   Orders Placed This Encounter   Procedures     MR Brain w/o & w Contrast     MR Cervical Spine w/o & w Contrast     MR Thoracic Spine w/o & w Contrast     CRP, inflammation     Erythrocyte Sed Rate ([facility])     Vitamin B12 ([facility])     TSH     I did review [facility] records including labs, she has had extensive autoimmune work-up already.  MRI of the lumbar spine was unremarkable, but no imaging of CNS so far so we will do that.    Given the family history of multiple sclerosis and the pain in the eyes, MS is a concern here (possible optic neuritis), though the eye pain and photophobia could also be migraine symptoms.  We will have to see what the MRI scans show.  I will see her back in 3 weeks, in the meantime we will let her know any concerning results that come in.    At this point I have not ordered NMO antibodies, though if there are MS findings on imaging that should be done.             Chief Complaint/HPI:     I saw this patient for neurologic evaluation today in our [place] office.  This is a 34-year-old woman.  About 10 years ago she had an episode of bilateral eye pain.  She describes needles and a stabbing type pain in the eyes.  She could not move her head because of the pain.  She has had another episode of bilateral eye pain more recently.  She thought maybe there was some blurring of her vision a little bit.  Also, for a long time now she has had significant light sensitivity.  Sometimes at work she even has to wear sunglasses, and she paid to have different lighting put in that would be less painful.  She has not had episodes of blurred vision or double vision or  unilateral vision loss or change in color perception.  In January of this year she had weakness in both legs.  She fell on 3 occasions.  Eventually the left leg was a little better and she could catch herself with that leg.  She had an MRI of the lumbar spine that was unremarkable.  She does have chronic low back pain and there is discussion of burning of the nerves.  She mentioned to her psychiatrist that she frequently bumps into things such as doors and walls and the psychiatrist thought that should be evaluated here.  She has a brother with multiple sclerosis.            Past Medical History:    has a past medical history of Hypothyroidism and IBS (irritable bowel syndrome).          Past Surgical History:    has a past surgical history that includes no history of surgery.          Social History:     Social History     Tobacco Use     Smoking status: Never Smoker     Smokeless tobacco: Never Used     Tobacco comment: smokers in home   Substance Use Topics     Alcohol use: Yes     Comment: rare             Family History:     Family History   Problem Relation Age of Onset     Thyroid Disease Other      Substance Abuse Father         EtOH     Thyroid Disease Maternal Grandmother      Cancer Brother         tumor in his thigh     Other Cancer Brother      Thyroid Disease Mother                 Allergies:     Allergies   Allergen Reactions     Ibuprofen Sodium Cramps and GI Disturbance             Medications:     Current Outpatient Medications:      acetaminophen (TYLENOL) 500 MG tablet, Take 1,000 mg by mouth, Disp: , Rfl:      albuterol (PROAIR HFA/PROVENTIL HFA/VENTOLIN HFA) 108 (90 Base) MCG/ACT inhaler, Inhale 2 puffs into the lungs, Disp: , Rfl:      amitriptyline (ELAVIL) 10 MG tablet, Take 10 mg by mouth, Disp: , Rfl:      fluticasone (FLONASE) 50 MCG/ACT spray, Spray 1 spray into both nostrils daily, Disp: , Rfl:      gabapentin (NEURONTIN) 600 MG tablet, To start, take ONE-HALF TABLET BY MOUTH at  "bedtime. If tolerated, take another ONE-HALF tabLET in the MORNING., Disp: , Rfl:      levonorgestrel (MIRENA) 20 MCG/24HR IUD, , Disp: , Rfl:      levothyroxine (SYNTHROID/LEVOTHROID) 75 MCG tablet, , Disp: , Rfl:      LORazepam (ATIVAN) 1 MG tablet, For MRI scans -- must have someone drive you home afterwards, Disp: 2 tablet, Rfl: 0     norethindrone (MICRONOR) 0.35 MG tablet, Take 0.35 mg by mouth, Disp: , Rfl:      sertraline (ZOLOFT) 50 MG tablet, Take 50 mg by mouth, Disp: , Rfl:      Cholecalciferol (VITAMIN D) 1000 UNITS capsule, Take 3 capsules by mouth daily., Disp: , Rfl:      levothyroxine (SYNTHROID/LEVOTHROID) 50 MCG tablet, Take 1 tablet (50 mcg) by mouth daily (Patient taking differently: Take by mouth daily 75 mcg), Disp: 90 tablet, Rfl: 3     loratadine 10 MG capsule, Take 10 mg by mouth daily., Disp: , Rfl:      norethindrone (MICRONOR) 0.35 MG per tablet, take 1 tablet by mouth daily (Patient not taking: Reported on 9/27/2018), Disp: 84 tablet, Rfl: 0     order for DME, Equipment being ordered: sling, Disp: 1 Units, Rfl: 0           Review of Systems:   Other than the above-mentioned symptoms a complete systems review is negative except for some urinary frequency.            Physical Exam:      Vitals: /64 (BP Location: Left arm, Patient Position: Sitting)   Pulse 85   Ht 1.651 m (5' 5\")   Wt 81.6 kg (180 lb)   BMI 29.95 kg/m    BMI= Body mass index is 29.95 kg/m .     Patient is alert and oriented x 3 in no acute distress. Neck was supple, no carotid bruits, thyromegaly, lymphadenopathy or JVD noted.   Neurological Exam:   Mental status: Patient is alert and oriented x 3. Speech is clear and fluent with good repetition, comprehension and naming. Patient recalls 3/3 objects at 5 minutes.   Cranial nerves:    CN II: Visual fields are full to confrontation. Fundoscopic exam is normal. PERRLA.   CN III, IV, VI: EOMI.    CN V: Facial sensation intact to pinprick in all 3 divisions " bilaterally.    CN VII: Face is symmetric with normal eye closure and smile.   CN VII: Hearing is normal to rubbing fingers   CN IX, X: Palate elevates symmetrically. Phonation is normal. Gag present.   CN XI: Head turning and shoulder shrug are intact   CN XII: Tongue is midline with normal movements and no atrophy or fasciculations.   Motor: Muscle bulk and tone are normal. No pronator drift. Strength is 5/5 bilaterally. No fasciculations noted.   Reflexes: Reflexes are symmetric, diminished throughout. Plantar responses are flexor.   Sensory: Light touch, pinprick, and vibration sense are intact bilaterally.    Coordination: Rapid alternating movements and fine finger movements are intact. There is no dysmetria on finger-to-nose and heel-knee-shin.    Gait: Posture is normal. Gait is steady with normal steps, base, arm swing, and turning. Heel and tandem walking are normal.          Pankaj English MD

## 2020-09-25 NOTE — TELEPHONE ENCOUNTER
Pharmacy left [facility] requesting a call back to clarify Lorazepam directions.

## 2020-09-25 NOTE — NURSING NOTE
Chief Complaint   Patient presents with     Consult     concerned with family hx of MS- today walking and felt like leaning, and today there is a HA. increased over the last month. Pos LETICIA. Also notes problms with and fatigue over the last number of years.     In clinic.  Natalie Babcock ATC

## 2020-09-25 NOTE — LETTER
9/25/2020         RE: Katiuska De Jesus  [address]  [place] [address]        Dear Colleague,    Thank you for referring your patient, Katiuska De Jesus, to the [facility] NEUROLOGY [place]. Please see a copy of my visit note below.    [facility] Neurology  [place]    Katiuska De Jesus MRN# [id number]   Age: 34 year old YOB: 1985               Assessment and Plan:   Assessment:   Episodes of bilateral eye pain  Single episode of bilateral leg weakness  Bilateral photophobia  Family history of multiple sclerosis (brother)        Plan:   Orders Placed This Encounter   Procedures     MR Brain w/o & w Contrast     MR Cervical Spine w/o & w Contrast     MR Thoracic Spine w/o & w Contrast     CRP, inflammation     Erythrocyte Sed Rate ([facility])     Vitamin B12 ([facility])     TSH     I did review [facility] records including labs, she has had extensive autoimmune work-up already.  MRI of the lumbar spine was unremarkable, but no imaging of CNS so far so we will do that.    Given the family history of multiple sclerosis and the pain in the eyes, MS is a concern here (possible optic neuritis), though the eye pain and photophobia could also be migraine symptoms.  We will have to see what the MRI scans show.  I will see her back in 3 weeks, in the meantime we will let her know any concerning results that come in.    At this point I have not ordered NMO antibodies, though if there are MS findings on imaging that should be done.             Chief Complaint/HPI:     I saw this patient for neurologic evaluation today in our [place] office.  This is a 34-year-old woman.  About 10 years ago she had an episode of bilateral eye pain.  She describes needles and a stabbing type pain in the eyes.  She could not move her head because of the pain.  She has had another episode of bilateral eye pain more recently.  She thought maybe there was some blurring of her vision a little bit.  Also,  for a long time now she has had significant light sensitivity.  Sometimes at work she even has to wear sunglasses, and she paid to have different lighting put in that would be less painful.  She has not had episodes of blurred vision or double vision or unilateral vision loss or change in color perception.  In January of this year she had weakness in both legs.  She fell on 3 occasions.  Eventually the left leg was a little better and she could catch herself with that leg.  She had an MRI of the lumbar spine that was unremarkable.  She does have chronic low back pain and there is discussion of burning of the nerves.  She mentioned to her psychiatrist that she frequently bumps into things such as doors and walls and the psychiatrist thought that should be evaluated here.  She has a brother with multiple sclerosis.            Past Medical History:    has a past medical history of Hypothyroidism and IBS (irritable bowel syndrome).          Past Surgical History:    has a past surgical history that includes no history of surgery.          Social History:     Social History     Tobacco Use     Smoking status: Never Smoker     Smokeless tobacco: Never Used     Tobacco comment: smokers in home   Substance Use Topics     Alcohol use: Yes     Comment: rare             Family History:     Family History   Problem Relation Age of Onset     Thyroid Disease Other      Substance Abuse Father         EtOH     Thyroid Disease Maternal Grandmother      Cancer Brother         tumor in his thigh     Other Cancer Brother      Thyroid Disease Mother                 Allergies:     Allergies   Allergen Reactions     Ibuprofen Sodium Cramps and GI Disturbance             Medications:     Current Outpatient Medications:      acetaminophen (TYLENOL) 500 MG tablet, Take 1,000 mg by mouth, Disp: , Rfl:      albuterol (PROAIR HFA/PROVENTIL HFA/VENTOLIN HFA) 108 (90 Base) MCG/ACT inhaler, Inhale 2 puffs into the lungs, Disp: , Rfl:       "amitriptyline (ELAVIL) 10 MG tablet, Take 10 mg by mouth, Disp: , Rfl:      fluticasone (FLONASE) 50 MCG/ACT spray, Spray 1 spray into both nostrils daily, Disp: , Rfl:      gabapentin (NEURONTIN) 600 MG tablet, To start, take ONE-HALF TABLET BY MOUTH at bedtime. If tolerated, take another ONE-HALF tabLET in the MORNING., Disp: , Rfl:      levonorgestrel (MIRENA) 20 MCG/24HR IUD, , Disp: , Rfl:      levothyroxine (SYNTHROID/LEVOTHROID) 75 MCG tablet, , Disp: , Rfl:      LORazepam (ATIVAN) 1 MG tablet, For MRI scans -- must have someone drive you home afterwards, Disp: 2 tablet, Rfl: 0     norethindrone (MICRONOR) 0.35 MG tablet, Take 0.35 mg by mouth, Disp: , Rfl:      sertraline (ZOLOFT) 50 MG tablet, Take 50 mg by mouth, Disp: , Rfl:      Cholecalciferol (VITAMIN D) 1000 UNITS capsule, Take 3 capsules by mouth daily., Disp: , Rfl:      levothyroxine (SYNTHROID/LEVOTHROID) 50 MCG tablet, Take 1 tablet (50 mcg) by mouth daily (Patient taking differently: Take by mouth daily 75 mcg), Disp: 90 tablet, Rfl: 3     loratadine 10 MG capsule, Take 10 mg by mouth daily., Disp: , Rfl:      norethindrone (MICRONOR) 0.35 MG per tablet, take 1 tablet by mouth daily (Patient not taking: Reported on 9/27/2018), Disp: 84 tablet, Rfl: 0     order for DME, Equipment being ordered: sling, Disp: 1 Units, Rfl: 0           Review of Systems:   Other than the above-mentioned symptoms a complete systems review is negative except for some urinary frequency.            Physical Exam:      Vitals: /64 (BP Location: Left arm, Patient Position: Sitting)   Pulse 85   Ht 1.651 m (5' 5\")   Wt 81.6 kg (180 lb)   BMI 29.95 kg/m    BMI= Body mass index is 29.95 kg/m .     Patient is alert and oriented x 3 in no acute distress. Neck was supple, no carotid bruits, thyromegaly, lymphadenopathy or JVD noted.   Neurological Exam:   Mental status: Patient is alert and oriented x 3. Speech is clear and fluent with good repetition, comprehension " and naming. Patient recalls 3/3 objects at 5 minutes.   Cranial nerves:    CN II: Visual fields are full to confrontation. Fundoscopic exam is normal. PERRLA.   CN III, IV, VI: EOMI.    CN V: Facial sensation intact to pinprick in all 3 divisions bilaterally.    CN VII: Face is symmetric with normal eye closure and smile.   CN VII: Hearing is normal to rubbing fingers   CN IX, X: Palate elevates symmetrically. Phonation is normal. Gag present.   CN XI: Head turning and shoulder shrug are intact   CN XII: Tongue is midline with normal movements and no atrophy or fasciculations.   Motor: Muscle bulk and tone are normal. No pronator drift. Strength is 5/5 bilaterally. No fasciculations noted.   Reflexes: Reflexes are symmetric, diminished throughout. Plantar responses are flexor.   Sensory: Light touch, pinprick, and vibration sense are intact bilaterally.    Coordination: Rapid alternating movements and fine finger movements are intact. There is no dysmetria on finger-to-nose and heel-knee-shin.    Gait: Posture is normal. Gait is steady with normal steps, base, arm swing, and turning. Heel and tandem walking are normal.          Pankaj English MD         Again, thank you for allowing me to participate in the care of your patient.        Sincerely,        Pankaj English MD

## 2020-09-28 ENCOUNTER — AMBULATORY - HEALTHEAST (OUTPATIENT)
Dept: LAB | Facility: CLINIC | Age: 35
End: 2020-09-28

## 2020-09-28 DIAGNOSIS — R29.898 LEG WEAKNESS: ICD-10-CM

## 2020-09-28 DIAGNOSIS — Z82.0 FH: MULTIPLE SCLEROSIS: ICD-10-CM

## 2020-09-28 DIAGNOSIS — H57.13 PAIN OF BOTH EYES: ICD-10-CM

## 2020-09-28 DIAGNOSIS — H53.19 PHOTOPSIA OF RIGHT EYE: ICD-10-CM

## 2020-09-28 DIAGNOSIS — H53.19 PHOTOPSIA OF LEFT EYE: ICD-10-CM

## 2020-09-28 LAB
C REACTIVE PROTEIN LHE: <0.1 MG/DL (ref 0–0.8)
ERYTHROCYTE [SEDIMENTATION RATE] IN BLOOD BY WESTERGREN METHOD: 13 MM/HR (ref 0–20)
TSH SERPL DL<=0.005 MIU/L-ACNC: 1.11 UIU/ML (ref 0.3–5)
VIT B12 SERPL-MCNC: 554 PG/ML (ref 213–816)

## 2020-09-29 ENCOUNTER — AMBULATORY - HEALTHEAST (OUTPATIENT)
Dept: INTERNAL MEDICINE | Facility: CLINIC | Age: 35
End: 2020-09-29

## 2020-09-29 DIAGNOSIS — E03.9 HYPOTHYROIDISM, UNSPECIFIED TYPE: ICD-10-CM

## 2020-10-09 ENCOUNTER — COMMUNICATION - HEALTHEAST (OUTPATIENT)
Dept: INTERNAL MEDICINE | Facility: CLINIC | Age: 35
End: 2020-10-09

## 2020-10-12 ENCOUNTER — HOSPITAL ENCOUNTER (OUTPATIENT)
Dept: MRI IMAGING | Facility: HOSPITAL | Age: 35
Discharge: HOME | End: 2020-10-12
Attending: PSYCHIATRY & NEUROLOGY

## 2020-10-12 DIAGNOSIS — R29.898 BILATERAL LEG WEAKNESS: ICD-10-CM

## 2020-10-12 DIAGNOSIS — Z82.0 FAMILY HISTORY OF MS (MULTIPLE SCLEROSIS): ICD-10-CM

## 2020-10-12 DIAGNOSIS — H53.143 PHOTOPHOBIA OF BOTH EYES: ICD-10-CM

## 2020-10-12 DIAGNOSIS — H57.10 EYE PAIN: ICD-10-CM

## 2020-10-16 PROBLEM — F32.A DEPRESSION, UNSPECIFIED DEPRESSION TYPE: Status: ACTIVE | Noted: 2019-07-19

## 2020-10-16 PROBLEM — D72.810 LYMPHOPENIA: Status: ACTIVE | Noted: 2020-01-23

## 2020-10-16 PROBLEM — Z00.00 ROUTINE HISTORY AND PHYSICAL EXAMINATION OF ADULT: Status: ACTIVE | Noted: 2019-10-18

## 2020-10-19 ENCOUNTER — RECORDS - HEALTHEAST (OUTPATIENT)
Dept: ADMINISTRATIVE | Facility: OTHER | Age: 35
End: 2020-10-19

## 2020-10-19 ENCOUNTER — VIRTUAL VISIT (OUTPATIENT)
Dept: NEUROLOGY | Facility: CLINIC | Age: 35
End: 2020-10-19
Payer: COMMERCIAL

## 2020-10-19 ENCOUNTER — OFFICE VISIT - HEALTHEAST (OUTPATIENT)
Dept: BEHAVIORAL HEALTH | Facility: CLINIC | Age: 35
End: 2020-10-19

## 2020-10-19 VITALS — BODY MASS INDEX: 29.99 KG/M2 | WEIGHT: 180 LBS | HEIGHT: 65 IN

## 2020-10-19 DIAGNOSIS — F32.A DEPRESSION, UNSPECIFIED DEPRESSION TYPE: ICD-10-CM

## 2020-10-19 DIAGNOSIS — F41.1 GAD (GENERALIZED ANXIETY DISORDER): ICD-10-CM

## 2020-10-19 DIAGNOSIS — R53.83 FATIGUE, UNSPECIFIED TYPE: Primary | ICD-10-CM

## 2020-10-19 PROCEDURE — 99214 OFFICE O/P EST MOD 30 MIN: CPT | Mod: 95 | Performed by: PSYCHIATRY & NEUROLOGY

## 2020-10-19 ASSESSMENT — MIFFLIN-ST. JEOR: SCORE: 1512.35

## 2020-10-19 NOTE — PROGRESS NOTES
[facility] Neurology  [place]    Katiuska De Jesus MRN# [id number]   Age: 34 year old YOB: 1985               Assessment and Plan:   Assessment:   Fatigue    MRI scans showed no sign of demyelination.        Plan:   Orders Placed This Encounter   Procedures     CBC with platelets     Comprehensive metabolic panel     We will check these further labs.  If labs look unremarkable then fatigue could be related to affective issues, medication side effect or possibly nonrestorative sleep though she does not have clear symptoms of obstructive sleep apnea.             Chief Complaint/HPI:     I saw this patient for follow-up today.  I met her initially 3 to 4 weeks ago.  At that time she had concerns for multiple sclerosis.  Her brother has MS, she has had episodes of bilateral eye pain and an episode of bilateral leg weakness.  I sent her for MRI scans of the brain, C-spine and T-spine.  These scans look fine with no parenchymal lesions.  I was happy to reassure her about that.  Today she complains of increasing fatigue.  She feels tired no matter how much sleep she gets or if she sticks to a sleep schedule.  She denies feeling significant depression now.  She is busy at work and is happy with that, her current job is much more agreeable than her previous job.  She describes herself as a light sleeper but does not describe lying awake for long periods of time at night.  She got a better pillow.  She does not wake up gasping or pulling for air.  She does feel like she could go back to sleep when she gets up in the morning.            Past Medical History:    has a past medical history of Hypothyroidism and IBS (irritable bowel syndrome).          Past Surgical History:    has a past surgical history that includes no history of surgery.          Social History:     Social History     Tobacco Use     Smoking status: Never Smoker     Smokeless tobacco: Never Used     Tobacco comment: smokers in home    Substance Use Topics     Alcohol use: Yes     Comment: rare             Family History:     Family History   Problem Relation Age of Onset     Thyroid Disease Other      Substance Abuse Father         EtOH     Thyroid Disease Maternal Grandmother      Cancer Brother         tumor in his thigh     Other Cancer Brother      Thyroid Disease Mother      Irritable Bowel Syndrome Sister      Depression Sister      No Known Problems Brother                 Allergies:     Allergies   Allergen Reactions     Ibuprofen Sodium Cramps and GI Disturbance             Medications:     Current Outpatient Medications:      acetaminophen (TYLENOL) 500 MG tablet, Take 1,000 mg by mouth, Disp: , Rfl:      albuterol (PROAIR HFA/PROVENTIL HFA/VENTOLIN HFA) 108 (90 Base) MCG/ACT inhaler, Inhale 2 puffs into the lungs, Disp: , Rfl:      amitriptyline (ELAVIL) 10 MG tablet, Take 10 mg by mouth, Disp: , Rfl:      fluticasone (FLONASE) 50 MCG/ACT spray, Spray 1 spray into both nostrils daily, Disp: , Rfl:      gabapentin (NEURONTIN) 600 MG tablet, To start, take ONE-HALF TABLET BY MOUTH at bedtime. If tolerated, take another ONE-HALF tabLET in the MORNING., Disp: , Rfl:      levonorgestrel (MIRENA) 20 MCG/24HR IUD, , Disp: , Rfl:      levothyroxine (SYNTHROID/LEVOTHROID) 75 MCG tablet, , Disp: , Rfl:      LORazepam (ATIVAN) 1 MG tablet, For MRI scans -- must have someone drive you home afterwards, Disp: 2 tablet, Rfl: 0     norethindrone (MICRONOR) 0.35 MG tablet, Take 0.35 mg by mouth, Disp: , Rfl:      sertraline (ZOLOFT) 50 MG tablet, Take 50 mg by mouth, Disp: , Rfl:            Review of Systems:   No difficulty breathing or swallowing, no double vision            Physical Exam:   Awake and alert with no aphasia no dysarthria  Speech is clear and coherent  Cranial nerves are fine  I do not see any focal or lateralized weakness or coordination difficulties in the arms  Gait looks steady here on the video.       The patient has been  "notified of following:     \"This video visit will be conducted via a call between you and your physician/provider. We have found that certain health care needs can be provided without the need for an in-person physical exam.  This service lets us provide the care you need with a video conversation.  If a prescription is necessary we can send it directly to your pharmacy.  If lab work is needed we can place an order for that and you can then stop by our lab to have the test done at a later time.    Video visits are billed at different rates depending on your insurance coverage.  Please reach out to your insurance provider with any questions.    If during the course of the call the physician/provider feels a video visit is not appropriate, you will not be charged for this service.\"    Patient has given verbal consent for Video visit? Yes      Video-Visit Details    Type of service:  Video Visit    Video Start Time: 10:02 AM  Video End Time: 9:42 AM    Originating Location (pt. Location): Home  Distant Location (provider location):  [facility] NEUROLOGY [place]   Platform used for Video Visit: Ricardo English MD         "

## 2020-10-19 NOTE — LETTER
10/19/2020         RE: Katiuska De Jesus  [address]  [place] [address]        Dear Colleague,    Thank you for referring your patient, Katiuska De Jesus, to the [facility] [place]. Please see a copy of my visit note below.    [facility] Neurology  [place]    Katiuska De Jesus MRN# [id number]   Age: 34 year old YOB: 1985               Assessment and Plan:   Assessment:   Fatigue    MRI scans showed no sign of demyelination.        Plan:   Orders Placed This Encounter   Procedures     CBC with platelets     Comprehensive metabolic panel     We will check these further labs.  If labs look unremarkable then fatigue could be related to affective issues, medication side effect or possibly nonrestorative sleep though she does not have clear symptoms of obstructive sleep apnea.             Chief Complaint/HPI:     I saw this patient for follow-up today.  I met her initially 3 to 4 weeks ago.  At that time she had concerns for multiple sclerosis.  Her brother has MS, she has had episodes of bilateral eye pain and an episode of bilateral leg weakness.  I sent her for MRI scans of the brain, C-spine and T-spine.  These scans look fine with no parenchymal lesions.  I was happy to reassure her about that.  Today she complains of increasing fatigue.  She feels tired no matter how much sleep she gets or if she sticks to a sleep schedule.  She denies feeling significant depression now.  She is busy at work and is happy with that, her current job is much more agreeable than her previous job.  She describes herself as a light sleeper but does not describe lying awake for long periods of time at night.  She got a better pillow.  She does not wake up gasping or pulling for air.  She does feel like she could go back to sleep when she gets up in the morning.            Past Medical History:    has a past medical history of Hypothyroidism and IBS (irritable bowel syndrome).           Past Surgical History:    has a past surgical history that includes no history of surgery.          Social History:     Social History     Tobacco Use     Smoking status: Never Smoker     Smokeless tobacco: Never Used     Tobacco comment: smokers in home   Substance Use Topics     Alcohol use: Yes     Comment: rare             Family History:     Family History   Problem Relation Age of Onset     Thyroid Disease Other      Substance Abuse Father         EtOH     Thyroid Disease Maternal Grandmother      Cancer Brother         tumor in his thigh     Other Cancer Brother      Thyroid Disease Mother      Irritable Bowel Syndrome Sister      Depression Sister      No Known Problems Brother                 Allergies:     Allergies   Allergen Reactions     Ibuprofen Sodium Cramps and GI Disturbance             Medications:     Current Outpatient Medications:      acetaminophen (TYLENOL) 500 MG tablet, Take 1,000 mg by mouth, Disp: , Rfl:      albuterol (PROAIR HFA/PROVENTIL HFA/VENTOLIN HFA) 108 (90 Base) MCG/ACT inhaler, Inhale 2 puffs into the lungs, Disp: , Rfl:      amitriptyline (ELAVIL) 10 MG tablet, Take 10 mg by mouth, Disp: , Rfl:      fluticasone (FLONASE) 50 MCG/ACT spray, Spray 1 spray into both nostrils daily, Disp: , Rfl:      gabapentin (NEURONTIN) 600 MG tablet, To start, take ONE-HALF TABLET BY MOUTH at bedtime. If tolerated, take another ONE-HALF tabLET in the MORNING., Disp: , Rfl:      levonorgestrel (MIRENA) 20 MCG/24HR IUD, , Disp: , Rfl:      levothyroxine (SYNTHROID/LEVOTHROID) 75 MCG tablet, , Disp: , Rfl:      LORazepam (ATIVAN) 1 MG tablet, For MRI scans -- must have someone drive you home afterwards, Disp: 2 tablet, Rfl: 0     norethindrone (MICRONOR) 0.35 MG tablet, Take 0.35 mg by mouth, Disp: , Rfl:      sertraline (ZOLOFT) 50 MG tablet, Take 50 mg by mouth, Disp: , Rfl:            Review of Systems:   No difficulty breathing or swallowing, no double vision            Physical Exam:  "  Awake and alert with no aphasia no dysarthria  Speech is clear and coherent  Cranial nerves are fine  I do not see any focal or lateralized weakness or coordination difficulties in the arms  Gait looks steady here on the video.       The patient has been notified of following:     \"This video visit will be conducted via a call between you and your physician/provider. We have found that certain health care needs can be provided without the need for an in-person physical exam.  This service lets us provide the care you need with a video conversation.  If a prescription is necessary we can send it directly to your pharmacy.  If lab work is needed we can place an order for that and you can then stop by our lab to have the test done at a later time.    Video visits are billed at different rates depending on your insurance coverage.  Please reach out to your insurance provider with any questions.    If during the course of the call the physician/provider feels a video visit is not appropriate, you will not be charged for this service.\"    Patient has given verbal consent for Video visit? Yes      Video-Visit Details    Type of service:  Video Visit    Video Start Time: 10:02 AM  Video End Time: 9:42 AM    Originating Location (pt. Location): Home  Distant Location (provider location):  [facility] NEUROLOGY [place]   Platform used for Video Visit: [facility]            Pankaj English MD             Again, thank you for allowing me to participate in the care of your patient.        Sincerely,        Pankaj English MD    "

## 2020-10-19 NOTE — NURSING NOTE
Chief Complaint   Patient presents with     Follow Up     Review MRI-She states she has been ok, just feels like she has had an increase of fatigue      Mychart Video Visit   Dion Hdz CMA on 10/19/2020 at 9:30 AM

## 2020-10-23 ENCOUNTER — AMBULATORY - HEALTHEAST (OUTPATIENT)
Dept: LAB | Facility: CLINIC | Age: 35
End: 2020-10-23

## 2020-10-23 DIAGNOSIS — M25.50 POLYARTHRALGIA: ICD-10-CM

## 2020-10-23 DIAGNOSIS — M79.10 MYALGIA: ICD-10-CM

## 2020-10-23 DIAGNOSIS — R76.8 ANA POSITIVE: ICD-10-CM

## 2020-10-23 LAB
ALBUMIN SERPL-MCNC: 4.1 G/DL (ref 3.5–5)
ALT SERPL W P-5'-P-CCNC: 34 U/L (ref 0–45)
BASOPHILS # BLD AUTO: 0 THOU/UL (ref 0–0.2)
BASOPHILS NFR BLD AUTO: 0 % (ref 0–2)
C3 SERPL-MCNC: 117 MG/DL (ref 83–177)
C4 SERPL-MCNC: 25 MG/DL (ref 19–59)
CREAT SERPL-MCNC: 0.79 MG/DL (ref 0.6–1.1)
EOSINOPHIL # BLD AUTO: 0.2 THOU/UL (ref 0–0.4)
EOSINOPHIL NFR BLD AUTO: 5 % (ref 0–6)
ERYTHROCYTE [DISTWIDTH] IN BLOOD BY AUTOMATED COUNT: 10.5 % (ref 11–14.5)
GFR SERPL CREATININE-BSD FRML MDRD: >60 ML/MIN/1.73M2
HCT VFR BLD AUTO: 40.1 % (ref 35–47)
HGB BLD-MCNC: 13.7 G/DL (ref 12–16)
LYMPHOCYTES # BLD AUTO: 1.7 THOU/UL (ref 0.8–4.4)
LYMPHOCYTES NFR BLD AUTO: 39 % (ref 20–40)
MCH RBC QN AUTO: 31.6 PG (ref 27–34)
MCHC RBC AUTO-ENTMCNC: 34.1 G/DL (ref 32–36)
MCV RBC AUTO: 93 FL (ref 80–100)
MONOCYTES # BLD AUTO: 0.3 THOU/UL (ref 0–0.9)
MONOCYTES NFR BLD AUTO: 7 % (ref 2–10)
NEUTROPHILS # BLD AUTO: 2.1 THOU/UL (ref 2–7.7)
NEUTROPHILS NFR BLD AUTO: 48 % (ref 50–70)
PLATELET # BLD AUTO: 152 THOU/UL (ref 140–440)
PMV BLD AUTO: 7.9 FL (ref 7–10)
RBC # BLD AUTO: 4.34 MILL/UL (ref 3.8–5.4)
WBC: 4.3 THOU/UL (ref 4–11)

## 2020-10-27 LAB — DNA (DS) ANTIBODY - HISTORICAL: 3 IU

## 2020-11-09 ENCOUNTER — COMMUNICATION - HEALTHEAST (OUTPATIENT)
Dept: PHYSICAL MEDICINE AND REHAB | Facility: CLINIC | Age: 35
End: 2020-11-09

## 2020-11-09 DIAGNOSIS — M47.816 LUMBAR FACET JOINT SYNDROME: ICD-10-CM

## 2020-11-11 ENCOUNTER — OFFICE VISIT - HEALTHEAST (OUTPATIENT)
Dept: BEHAVIORAL HEALTH | Facility: CLINIC | Age: 35
End: 2020-11-11

## 2020-11-11 DIAGNOSIS — F32.A DEPRESSION, UNSPECIFIED DEPRESSION TYPE: ICD-10-CM

## 2020-11-11 DIAGNOSIS — F41.1 GAD (GENERALIZED ANXIETY DISORDER): ICD-10-CM

## 2020-11-20 ENCOUNTER — COMMUNICATION - HEALTHEAST (OUTPATIENT)
Dept: OBGYN | Facility: CLINIC | Age: 35
End: 2020-11-20

## 2020-11-23 ENCOUNTER — HOSPITAL ENCOUNTER (OUTPATIENT)
Dept: PHYSICAL MEDICINE AND REHAB | Facility: CLINIC | Age: 35
Discharge: HOME | End: 2020-11-23
Attending: PAIN MEDICINE

## 2020-11-23 ENCOUNTER — COMMUNICATION - HEALTHEAST (OUTPATIENT)
Dept: PHYSICAL MEDICINE AND REHAB | Facility: CLINIC | Age: 35
End: 2020-11-23

## 2020-11-23 DIAGNOSIS — M47.816 LUMBAR FACET JOINT SYNDROME: ICD-10-CM

## 2020-11-24 ENCOUNTER — AMBULATORY - HEALTHEAST (OUTPATIENT)
Dept: PHYSICAL MEDICINE AND REHAB | Facility: CLINIC | Age: 35
End: 2020-11-24

## 2020-11-24 DIAGNOSIS — M47.816 SPONDYLOSIS OF LUMBAR REGION WITHOUT MYELOPATHY OR RADICULOPATHY: ICD-10-CM

## 2020-12-02 ENCOUNTER — OFFICE VISIT - HEALTHEAST (OUTPATIENT)
Dept: BEHAVIORAL HEALTH | Facility: CLINIC | Age: 35
End: 2020-12-02

## 2020-12-02 DIAGNOSIS — F41.1 GAD (GENERALIZED ANXIETY DISORDER): ICD-10-CM

## 2020-12-02 DIAGNOSIS — F32.A DEPRESSION, UNSPECIFIED DEPRESSION TYPE: ICD-10-CM

## 2020-12-04 ENCOUNTER — COMMUNICATION - HEALTHEAST (OUTPATIENT)
Dept: INTERNAL MEDICINE | Facility: CLINIC | Age: 35
End: 2020-12-04

## 2020-12-06 ENCOUNTER — HEALTH MAINTENANCE LETTER (OUTPATIENT)
Age: 35
End: 2020-12-06

## 2020-12-10 ENCOUNTER — OFFICE VISIT (OUTPATIENT)
Dept: VASCULAR SURGERY | Facility: CLINIC | Age: 35
End: 2020-12-10
Payer: COMMERCIAL

## 2020-12-10 DIAGNOSIS — I83.93 SPIDER VEINS OF BOTH LOWER EXTREMITIES: Primary | ICD-10-CM

## 2020-12-10 PROCEDURE — 99202 OFFICE O/P NEW SF 15 MIN: CPT | Performed by: SURGERY

## 2020-12-10 NOTE — LETTER
12/10/2020         RE: Katiuska De Jesus  [address]  [place] [address]        Dear Colleague,    Thank you for referring your patient, Katiuska De Jesus, to the [facility] [place]. Please see a copy of my visit note below.    Ms. De Jesus is here for evaluation of bilateral lower extremity spider veins.  She has small spider veins and describes small varicose veins in both lower extremities.  She describes occasional pain.  She does have pain in the lower extremity that actually comes from the spine.    On examination she has small spider and varicose veins in both lower extremities without chronic venous insufficiency.    I have reassured her that since her symptoms are minimal external compression is all that is advised.  She has verbalized her understanding.  She can follow-up as needed.      Again, thank you for allowing me to participate in the care of your patient.        Sincerely,        Kendall Dutton MD

## 2020-12-15 ENCOUNTER — COMMUNICATION - HEALTHEAST (OUTPATIENT)
Dept: PHYSICAL MEDICINE AND REHAB | Facility: CLINIC | Age: 35
End: 2020-12-15

## 2020-12-18 ENCOUNTER — RECORDS - HEALTHEAST (OUTPATIENT)
Dept: ADMINISTRATIVE | Facility: OTHER | Age: 35
End: 2020-12-18

## 2020-12-23 ENCOUNTER — HOSPITAL ENCOUNTER (OUTPATIENT)
Dept: PHYSICAL MEDICINE AND REHAB | Facility: CLINIC | Age: 35
Discharge: HOME | End: 2020-12-23
Attending: PAIN MEDICINE

## 2020-12-23 DIAGNOSIS — M47.816 SPONDYLOSIS OF LUMBAR REGION WITHOUT MYELOPATHY OR RADICULOPATHY: ICD-10-CM

## 2020-12-28 ENCOUNTER — RECORDS - HEALTHEAST (OUTPATIENT)
Dept: ADMINISTRATIVE | Facility: OTHER | Age: 35
End: 2020-12-28

## 2020-12-28 ENCOUNTER — COMMUNICATION - HEALTHEAST (OUTPATIENT)
Dept: INTERNAL MEDICINE | Facility: CLINIC | Age: 35
End: 2020-12-28

## 2020-12-28 DIAGNOSIS — R53.83 FATIGUE, UNSPECIFIED TYPE: ICD-10-CM

## 2021-01-29 ENCOUNTER — COMMUNICATION - HEALTHEAST (OUTPATIENT)
Dept: INTERNAL MEDICINE | Facility: CLINIC | Age: 36
End: 2021-01-29

## 2021-01-29 ENCOUNTER — COMMUNICATION - HEALTHEAST (OUTPATIENT)
Dept: SCHEDULING | Facility: CLINIC | Age: 36
End: 2021-01-29

## 2021-01-29 DIAGNOSIS — E03.9 HYPOTHYROIDISM, UNSPECIFIED TYPE: ICD-10-CM

## 2021-03-31 ENCOUNTER — OFFICE VISIT - HEALTHEAST (OUTPATIENT)
Dept: CARDIOLOGY | Facility: CLINIC | Age: 36
End: 2021-03-31

## 2021-03-31 DIAGNOSIS — R06.09 DYSPNEA ON EXERTION: ICD-10-CM

## 2021-03-31 LAB
ATRIAL RATE - MUSE: 65 BPM
DIASTOLIC BLOOD PRESSURE - MUSE: NORMAL
INTERPRETATION ECG - MUSE: NORMAL
P AXIS - MUSE: 11 DEGREES
PR INTERVAL - MUSE: 120 MS
QRS DURATION - MUSE: 90 MS
QT - MUSE: 428 MS
QTC - MUSE: 445 MS
R AXIS - MUSE: 18 DEGREES
SYSTOLIC BLOOD PRESSURE - MUSE: NORMAL
T AXIS - MUSE: 22 DEGREES
VENTRICULAR RATE- MUSE: 65 BPM

## 2021-03-31 ASSESSMENT — MIFFLIN-ST. JEOR: SCORE: 1552.71

## 2021-04-05 ENCOUNTER — COMMUNICATION - HEALTHEAST (OUTPATIENT)
Dept: INTERNAL MEDICINE | Facility: CLINIC | Age: 36
End: 2021-04-05

## 2021-04-08 ENCOUNTER — OFFICE VISIT - HEALTHEAST (OUTPATIENT)
Dept: OBGYN | Facility: CLINIC | Age: 36
End: 2021-04-08

## 2021-04-08 DIAGNOSIS — N94.6 DYSMENORRHEA: ICD-10-CM

## 2021-04-08 DIAGNOSIS — F41.9 ANXIETY: ICD-10-CM

## 2021-04-08 DIAGNOSIS — M79.7 FIBROMYALGIA: ICD-10-CM

## 2021-04-08 DIAGNOSIS — N92.1 MENORRHAGIA WITH IRREGULAR CYCLE: ICD-10-CM

## 2021-04-08 DIAGNOSIS — R53.82 CHRONIC FATIGUE: ICD-10-CM

## 2021-04-08 ASSESSMENT — MIFFLIN-ST. JEOR: SCORE: 1548.18

## 2021-04-16 ENCOUNTER — OFFICE VISIT - HEALTHEAST (OUTPATIENT)
Dept: INTERNAL MEDICINE | Facility: CLINIC | Age: 36
End: 2021-04-16

## 2021-04-16 DIAGNOSIS — F41.9 ANXIETY: ICD-10-CM

## 2021-04-16 DIAGNOSIS — F32.0 MILD MAJOR DEPRESSION (H): ICD-10-CM

## 2021-04-16 ASSESSMENT — ANXIETY QUESTIONNAIRES
4. TROUBLE RELAXING: NEARLY EVERY DAY
GAD7 TOTAL SCORE: 16
5. BEING SO RESTLESS THAT IT IS HARD TO SIT STILL: MORE THAN HALF THE DAYS
6. BECOMING EASILY ANNOYED OR IRRITABLE: MORE THAN HALF THE DAYS
2. NOT BEING ABLE TO STOP OR CONTROL WORRYING: NEARLY EVERY DAY
1. FEELING NERVOUS, ANXIOUS, OR ON EDGE: NEARLY EVERY DAY
7. FEELING AFRAID AS IF SOMETHING AWFUL MIGHT HAPPEN: NOT AT ALL
3. WORRYING TOO MUCH ABOUT DIFFERENT THINGS: NEARLY EVERY DAY

## 2021-04-16 ASSESSMENT — PATIENT HEALTH QUESTIONNAIRE - PHQ9: SUM OF ALL RESPONSES TO PHQ QUESTIONS 1-9: 14

## 2021-04-16 ASSESSMENT — MIFFLIN-ST. JEOR: SCORE: 1561.79

## 2021-04-22 ENCOUNTER — HOSPITAL ENCOUNTER (OUTPATIENT)
Dept: CARDIOLOGY | Facility: HOSPITAL | Age: 36
Discharge: HOME | End: 2021-04-22
Attending: INTERNAL MEDICINE

## 2021-04-22 LAB
AORTIC ROOT: 2.7 CM
AORTIC VALVE MEAN VELOCITY: 101 CM/S
ASCENDING AORTA: 2.8 CM
AV DIMENSIONLESS INDEX VTI: 0.8
AV MEAN GRADIENT: 4 MMHG
AV PEAK GRADIENT: 8.3 MMHG
AV VALVE AREA: 2.5 CM2
AV VELOCITY RATIO: 0.7
BSA FOR ECHO PROCEDURE: 2 M2
CV BLOOD PRESSURE: NORMAL MMHG
CV ECHO HEIGHT: 65 IN
CV ECHO WEIGHT: 192 LBS
DOP CALC AO PEAK VEL: 144 CM/S
DOP CALC AO VTI: 29.3 CM
DOP CALC LVOT AREA: 3.14 CM2
DOP CALC LVOT DIAMETER: 2 CM
DOP CALC LVOT PEAK VEL: 107 CM/S
DOP CALC LVOT STROKE VOLUME: 71.9 CM3
DOP CALCLVOT PEAK VEL VTI: 22.9 CM
EJECTION FRACTION: 55 % (ref 55–75)
FRACTIONAL SHORTENING: 30.4 % (ref 28–44)
INTERVENTRICULAR SEPTUM IN END DIASTOLE: 0.88 CM (ref 0.6–0.9)
IVS/PW RATIO: 1.1
LA AREA 1: 17.1 CM2
LA AREA 2: 17.8 CM2
LEFT ATRIUM LENGTH: 5 CM
LEFT ATRIUM SIZE: 3.5 CM
LEFT ATRIUM TO AORTIC ROOT RATIO: 1.3 NO UNITS
LEFT ATRIUM VOLUME INDEX: 25.9 ML/M2
LEFT ATRIUM VOLUME: 51.7 ML
LEFT VENTRICLE CARDIAC INDEX: 3.3 L/MIN/M2
LEFT VENTRICLE CARDIAC OUTPUT: 6.7 L/MIN
LEFT VENTRICLE DIASTOLIC VOLUME INDEX: 47.7 CM3/M2 (ref 29–61)
LEFT VENTRICLE DIASTOLIC VOLUME: 95.4 CM3 (ref 46–106)
LEFT VENTRICLE HEART RATE: 93 BPM
LEFT VENTRICLE MASS INDEX: 69.5 G/M2
LEFT VENTRICLE SYSTOLIC VOLUME INDEX: 21.5 CM3/M2 (ref 8–24)
LEFT VENTRICLE SYSTOLIC VOLUME: 43 CM3 (ref 14–42)
LEFT VENTRICULAR INTERNAL DIMENSION IN DIASTOLE: 4.87 CM (ref 3.8–5.2)
LEFT VENTRICULAR INTERNAL DIMENSION IN SYSTOLE: 3.39 CM (ref 2.2–3.5)
LEFT VENTRICULAR MASS: 139.1 G
LEFT VENTRICULAR OUTFLOW TRACT MEAN GRADIENT: 2 MMHG
LEFT VENTRICULAR OUTFLOW TRACT MEAN VELOCITY: 72.4 CM/S
LEFT VENTRICULAR OUTFLOW TRACT PEAK GRADIENT: 5 MMHG
LEFT VENTRICULAR POSTERIOR WALL IN END DIASTOLE: 0.81 CM (ref 0.6–0.9)
LV STROKE VOLUME INDEX: 36 ML/M2
MITRAL VALVE DECELERATION SLOPE: 4650 MM/S2
MITRAL VALVE DECELERATION SLOPE: 4650 MM/S2
MITRAL VALVE E/A RATIO: 1.4
MITRAL VALVE PRESSURE HALF-TIME: 63 MS
MV AVERAGE E/E' RATIO: 7.2 CM/S
MV DECELERATION TIME: 214 MS
MV E'TISSUE VEL-LAT: 15.1 CM/S
MV E'TISSUE VEL-MED: 12.6 CM/S
MV LATERAL E/E' RATIO: 6.6
MV MEDIAL E/E' RATIO: 7.9
MV PEAK A VELOCITY: 72 CM/S
MV PEAK E VELOCITY: 99.4 CM/S
MV VALVE AREA PRESSURE 1/2 METHOD: 3.5 CM2
NUC REST DIASTOLIC VOLUME INDEX: 3072 LBS
NUC REST SYSTOLIC VOLUME INDEX: 65 IN
TRICUSPID REGURGITATION PEAK PRESSURE GRADIENT: 17.8 MMHG
TRICUSPID VALVE ANULAR PLANE SYSTOLIC EXCURSION: 2.3 CM
TRICUSPID VALVE PEAK REGURGITANT VELOCITY: 211 CM/S

## 2021-04-22 ASSESSMENT — MIFFLIN-ST. JEOR: SCORE: 1561.79

## 2021-05-26 ASSESSMENT — PATIENT HEALTH QUESTIONNAIRE - PHQ9
SUM OF ALL RESPONSES TO PHQ QUESTIONS 1-9: 10
SUM OF ALL RESPONSES TO PHQ QUESTIONS 1-9: 11

## 2021-05-27 ASSESSMENT — PATIENT HEALTH QUESTIONNAIRE - PHQ9
SUM OF ALL RESPONSES TO PHQ QUESTIONS 1-9: 14
SUM OF ALL RESPONSES TO PHQ QUESTIONS 1-9: 11
SUM OF ALL RESPONSES TO PHQ QUESTIONS 1-9: 14

## 2021-05-27 NOTE — PROGRESS NOTES
"IUD Insertion Procedure Note    Subjective: The patient is here for Mirena insertion secondary to menorrhagia and dysmenorrhea.  See note from 4/4/19.  She has been counseled on risks, benefits, and alternatives.    Objective:   /60   Pulse 62   Ht 5' 5\" (1.651 m)   Wt 167 lb (75.8 kg)   LMP 04/15/2019   Body mass index is 27.79 kg/m .    Urine pregnancy test was done and result was negative.    Procedure Details   The risks (including infection, bleeding, pain, and uterine perforation, expulsion and failure to prevent pregancy, uterine perforation) and benefits of the procedure were explained to the patient and informed consent was obtained.      A speculum was placed, and the cervix and upper vagina were prepped with Betadine. The anterior lip of the cervix was grasped with a single-tooth tenaculum.  Uterus was sounded to a depth of 8 cm, and the IUD was placed without difficulty in the uterine fundus.  Strings were trimmed to 3 cm in length.  Speculum and tenaculum were removed. The patient tolerated the procedure well.  Lot # [id number], exp date Aug 2021.    Assessment  Successful IUD insertion.    Plan  She is given instructions for checking her IUD string and is encouraged to call for any concerns.             "

## 2021-05-27 NOTE — PROGRESS NOTES
"CC: The patient is being seen as a consult from Nadya Villalpando CNP, secondary to abnormal periods.    HPI: The pt is a 33 y.o. SWF P0 who presents with worsening periods over the last 2 years.  Her periods have always been irregular and \"mildly\" heavy, lasting 5-7 days.  Over the last 2 years they have become more irregular, ranging from 2-4 weeks apart, with light bleeding for a week then heavy bleeding for 3 days with significant \"violent\" pain that prevents her from going to work.  She uses a menstrual cup that she has to change twice in a 24 hour period.  It is full when she changes it.  On her light days she still empties it twice but it is only 1/8 to 1/4 full.  The pain starts as a cramps that changes to dull then becomes a sharp pain up her back. It goes in waves and gets worse the longer it lasts.  It is there for all 3 days of the heavier flow.  She has tried Tylenol and aspirin without help.  She gets significant GI issues with ibuprofen so doesn't take it.  She used OCPs for about 4 years starting at age 20.  They initially did well, but then she started having migraines with aura so she stopped combination pills and changed to progestin only pills.  These worked well until Oct of 2017 when she started bleeding every day.  It took about 2 months to get back to more regular periods, and then she noted her current changes.  She had a normal TSH on 3/20/19.  She would like to maintain the option for conception.    Past Medical History:   Diagnosis Date     Fatigue      Hypothyroid      Migraine        History reviewed. No pertinent surgical history.    Patient's   Family History   Problem Relation Age of Onset     Cancer Brother          Tumor  in this thigh     Alcohol abuse Brother      Depression Brother      Drug abuse Brother      Early death Brother      Thyroid disease Maternal Grandmother      Arthritis Maternal Grandmother      Asthma Maternal Grandmother      Diabetes Maternal Grandmother      " "Arthritis Mother      Depression Mother      Alcohol abuse Father        Patient   Social History     Socioeconomic History     Marital status: Single     Spouse name: None     Number of children: None     Years of education: None     Highest education level: None   Occupational History     None   Social Needs     Financial resource strain: None     Food insecurity:     Worry: None     Inability: None     Transportation needs:     Medical: None     Non-medical: None   Tobacco Use     Smoking status: Never Smoker     Smokeless tobacco: Never Used   Substance and Sexual Activity     Alcohol use: None     Drug use: None     Sexual activity: None   Lifestyle     Physical activity:     Days per week: None     Minutes per session: None     Stress: None   Relationships     Social connections:     Talks on phone: None     Gets together: None     Attends Zoroastrianism service: None     Active member of club or organization: None     Attends meetings of clubs or organizations: None     Relationship status: None     Intimate partner violence:     Fear of current or ex partner: None     Emotionally abused: None     Physically abused: None     Forced sexual activity: None   Other Topics Concern     None   Social History Narrative     None       Outpatient Medications Prior to Visit   Medication Sig Dispense Refill     levothyroxine (SYNTHROID, LEVOTHROID) 75 MCG tablet Take 1 tablet by mouth daily.  3     pyridoxine, vitamin B6, (VITAMIN B-6) 250 MG tablet Take 250 mg by mouth daily.       sertraline (ZOLOFT) 50 MG tablet 1/2 tablet daily for 1 week then 1 tablet daily thereafter 30 tablet 0     CHOLECALCIFEROL, VITAMIN D3, ORAL Take by mouth.       No facility-administered medications prior to visit.        Patient is allergic to motrin [ibuprofen].    ROS:  12 part ROS is negative aside from those symptoms in the HPI    PE:  /60   Pulse 80   Ht 5' 5\" (1.651 m)   Wt 169 lb (76.7 kg)   LMP 03/11/2019 (Exact Date)           " Body mass index is 28.12 kg/m .    General: overweight WF, NAD  Psych: normal mood  Neuro: CN I-XII grossly intact  MS: normal gait    Assessment: 33 y.o. SWF P0 with irregular menses, menorrhagia, and dysmenorrhea    Plan: Natural history of the above discussed with the patient.  We discussed potential options for her with the limitations with her migraines with aura (no combination OCPs), ibuprofen sensitivity (likely no Lysteda), and potential desire for child bearing (no endometrial ablation).  We discussed Depo Provera, Mirena, and luteal progesterone as potential options.  We also discussed ablation to some degree if nothing else works.  Specific to the ablation we discussed that some women get worse dysmenorrhea afterwards.  After some discussion she decided she'd like to try a Mirena.  Prescription for misoprostol to use the day before insertion was sent in for her as she's not had children.  She was counseled on the risks and benefits including uterine perforation.  I recommended that she take 1000 mg of Tylenol about an hour before her appointment.    Questions were answered to the best of my ability.  Approximately 30 minutes were spent with the patient with the majority in counseling.

## 2021-05-28 ASSESSMENT — ANXIETY QUESTIONNAIRES
GAD7 TOTAL SCORE: 16
GAD7 TOTAL SCORE: 19
GAD7 TOTAL SCORE: 12
GAD7 TOTAL SCORE: 10
GAD7 TOTAL SCORE: 7

## 2021-05-28 NOTE — TELEPHONE ENCOUNTER
Last OV 04/26/19        sertraline (ZOLOFT) 50 MG tablet 30 tablet 1 4/26/2019  No   Sig - Route: Take 1 tablet (50 mg total) by mouth daily. - Oral   Sent to pharmacy as: sertraline (ZOLOFT) 50 MG tablet   Notes to Pharmacy: Hold until patient notifies pharmacy for refill.   E-Prescribing Status: Receipt confirmed by pharmacy (4/26/2019  2:13 PM CDT)

## 2021-05-28 NOTE — PROGRESS NOTES
Internal Medicine Office Visit  [facility]  Patient Name: Enedelia De Jesus  Patient Age: 33 y.o.  YOB: 1985  MRN: [id number]    Date of Visit: 2019  Reason for Office Visit:   Chief Complaint   Patient presents with     Follow-up     Menstrual cycle. Off and on cramps since last cylce. Started on the 9th for 2 days then stopped. Then restarted in the  with really bad cramps. Got the IUD on . Still having cramps off and on.            Assessment / Plan / Medical Decision Makin. Depression, unspecified depression type  Recommend patient continue on sertraline 50 mg daily and follow-up in 3 months  - sertraline (ZOLOFT) 50 MG tablet; Take 1 tablet (50 mg total) by mouth daily.  Dispense: 30 tablet; Refill: 1    2. Irregular periods  Patient will continue to monitor her symptoms in the event her menstrual cycle worsens or she is not continuing to see improvement she will contact the gynecologist office.      No orders of the defined types were placed in this encounter.  Followup: Return in about 3 months (around 2019). earlier if needed.    Health Maintenance Review  Health Maintenance   Topic Date Due     DEPRESSION FOLLOW UP  1985     TD 18+ HE  2002     TDAP ADULT ONE TIME DOSE  2002     ADVANCE DIRECTIVES DISCUSSED WITH PATIENT  2002     INFLUENZA VACCINE RULE BASED (Season Ended) 2019     PAP SMEAR  2021         I am having Enedelia De Jesus maintain her levothyroxine, (CHOLECALCIFEROL, VITAMIN D3, ORAL), pyridoxine (vitamin B6), levonorgestrel, and sertraline.      HPI:  Enedelia De Jesus is a 33 y.o. year old who presents to the office today     Has spotting every other day and rotation of cramps and is taking Tylenol.  She reports she placed on 2019 still having cramping on and off though has improved from previous.    Patient has a diagnosis of depression at her last clinic visit she was  placed on sertraline 50 mg daily she notes significant improvement in her symptoms.  Patient wishes to continue with his current medication.  Patient notes no additional concerns.    Review of Systems- pertinent positive in bold:  Constitutional: Fever, chills, night sweats, fainting, weight change, fatigue, dizziness, sleeping difficulties, loud snoring/pauses in breathing  Eyes: change in vision, blurred or double vision, redness/eye pain  Ears, nose, mouth, throat: change in hearing, ear pain, hoarseness, difficulty swallowing, sores in the mouth or throat  Respiratory: shortness of breath, cough, bloody sputum, wheezing  Cardiovascular: chest pain, palpitations   Gastrointestinal: abdominal pain, heartburn/indigestion, nausea/vomiting, change in appetite, change in bowel habits, constipation or diarrhea, rectal bleeding/dark stools, difficulty swallowing  Urinary: painful urination, frequent urination, urinary urgency/incontinence, blood in urine/dark urine, nocturia (new or increasing), muscle cramps, swelling of hands, feet, ankles, leg pain/redness  Skin: change in moles/freckles, rash, nodules  Hematologic/lymphatic: swollen lymph glands, abnormal bruising/bleeding  Endocrine: excessive thirst/urination, cold or heat intolerance  Neurologic/emotional: worrisome memory change, numbness/tingling, anxiety, mood swings      Current Scheduled Meds:  Outpatient Encounter Medications as of 4/26/2019   Medication Sig Dispense Refill     CHOLECALCIFEROL, VITAMIN D3, ORAL Take by mouth.       levonorgestrel (MIRENA) 20 mcg/24 hours (5 yrs) 52 mg IUD 1 each by Intrauterine route once.       levothyroxine (SYNTHROID, LEVOTHROID) 75 MCG tablet Take 1 tablet by mouth daily.  3     pyridoxine, vitamin B6, (VITAMIN B-6) 250 MG tablet Take 250 mg by mouth daily.       sertraline (ZOLOFT) 50 MG tablet Take 1 tablet (50 mg total) by mouth daily. 30 tablet 1     [DISCONTINUED] sertraline (ZOLOFT) 50 MG tablet Take 1 tablet (50  "mg total) by mouth daily. 30 tablet 0     No facility-administered encounter medications on file as of 4/26/2019.      Past Medical History:   Diagnosis Date     Fatigue      Hypothyroid      Migraine      No past surgical history on file.  Social History     Tobacco Use     Smoking status: Never Smoker     Smokeless tobacco: Never Used   Substance Use Topics     Alcohol use: Not on file     Drug use: Not on file       Objective / Physical Examination:  Vitals:    04/26/19 1406   BP: 100/68   Pulse: 76   Resp: 20   Temp: 98.5  F (36.9  C)   SpO2: 98%   Weight: 170 lb (77.1 kg)   Height: 5' 5\" (1.651 m)     Wt Readings from Last 3 Encounters:   04/26/19 170 lb (77.1 kg)   04/16/19 167 lb (75.8 kg)   04/04/19 169 lb (76.7 kg)     Body mass index is 28.29 kg/m .     General Appearance: Alert and oriented, cooperative, affect appropriate, speech clear, in no apparent distress  Head: Normocephalic, atraumatic  Eyes: Conjunctivae clear and sclerae non-icteric  Throat: Lips and mucosa moist.   Neck: Supple, trachea midline. No cervical adenopathy  Lungs: Clear to auscultation bilaterally. No adventitious lung sounds noted. Normal inspiratory and expiratory effort  Cardiovascular: Regular rate, normal S1, S2. No murmurs, rubs, or gallops  Integumentary: Warm and dry. Without suspicious looking lesions  Neuro: Alert and oriented, follows commands appropriately.             Nadya Villalpando, CNP  "

## 2021-05-30 NOTE — PROGRESS NOTES
Dear Dr. Villalpando, Nadya Enriquez, Cnp  [address] [address]    Thank you for the opportunity to participate in the care of Ms. Enedelia De Jesus.    She is a 33 y.o. female who comes to the clinic with a chief complaint of excessive daytime sleepiness that is been going on for more than 5 years.  While the patient denies any episodes of witnessed apnea she has been told that she does occasionally snore.  She also reports having very vivid dreams upon awakening.  However despite adequate hours of sleep she always feels tired and has had episodes of drowsy driving.  Strongly advised the patient against drowsy driving at all cost.     Ideal Sleep-Wake Cycle(devoid of societal pressure):    Patient would try to initiate sleep at around 1 AM with a sleep latency of 20 minutes. The patient would have multiple awakenings. Final wake up time is around 11 AM.    Past Medical History  Past Medical History:   Diagnosis Date     Depression      Fatigue      Hypothyroid      Migraine         Past Surgical History  No past surgical history on file.     Meds  Current Outpatient Medications   Medication Sig Dispense Refill     CHOLECALCIFEROL, VITAMIN D3, ORAL Take by mouth.       levonorgestrel (MIRENA) 20 mcg/24 hours (5 yrs) 52 mg IUD 1 each by Intrauterine route once.       levothyroxine (SYNTHROID, LEVOTHROID) 75 MCG tablet Take 1 tablet by mouth daily.  3     pyridoxine, vitamin B6, (VITAMIN B-6) 250 MG tablet Take 250 mg by mouth daily.       sertraline (ZOLOFT) 50 MG tablet Take 1 tablet (50 mg total) by mouth daily. 90 tablet 1     No current facility-administered medications for this visit.         Allergies  Motrin [ibuprofen]     Social History  Social History     Socioeconomic History     Marital status: Single     Spouse name: Not on file     Number of children: Not on file     Years of education: Not on file     Highest education level: Not on file   Occupational History     Not on file    Social Needs     Financial resource strain: Not on file     Food insecurity:     Worry: Not on file     Inability: Not on file     Transportation needs:     Medical: Not on file     Non-medical: Not on file   Tobacco Use     Smoking status: Never Smoker     Smokeless tobacco: Never Used   Substance and Sexual Activity     Alcohol use: Not on file     Drug use: Not on file     Sexual activity: Not on file   Lifestyle     Physical activity:     Days per week: Not on file     Minutes per session: Not on file     Stress: Not on file   Relationships     Social connections:     Talks on phone: Not on file     Gets together: Not on file     Attends Anabaptist service: Not on file     Active member of club or organization: Not on file     Attends meetings of clubs or organizations: Not on file     Relationship status: Not on file     Intimate partner violence:     Fear of current or ex partner: Not on file     Emotionally abused: Not on file     Physically abused: Not on file     Forced sexual activity: Not on file   Other Topics Concern     Not on file   Social History Narrative     Not on file        Family History  Family History   Problem Relation Age of Onset     Cancer Brother          Tumor  in this thigh     Alcohol abuse Brother      Depression Brother      Drug abuse Brother      Early death Brother      Thyroid disease Maternal Grandmother      Arthritis Maternal Grandmother      Asthma Maternal Grandmother      Diabetes Maternal Grandmother      Arthritis Mother      Depression Mother      Alcohol abuse Father         Review of Systems:  Constitutional: Negative except as noted in HPI.   Eyes: Negative except as noted in HPI.   ENT: Negative except as noted in HPI.   Cardiovascular: Negative except as noted in HPI.   Respiratory: Negative except as noted in HPI.   Gastrointestinal: Negative except as noted in HPI.   Genitourinary: Negative except as noted in HPI.   Musculoskeletal: Negative except as noted in  "HPI.   Integumentary: Negative except as noted in HPI.   Neurological: Negative except as noted in HPI.   Psychiatric: Negative except as noted in HPI.   Endocrine: Negative except as noted in HPI.   Hematologic/Lymphatic: Negative except as noted in HPI.      STOP BANG 7/26/2019   Do you snore loudly (louder than talking or loud enough to be heard through closed doors)? 0   Do you often feel tired, fatigued, or sleepy during daytime? 1   Has anyone observed you stop breathing in your sleep? 0   Do you have or are you being treated for high blood pressure? 0   BMI more than 35 kg/m2 0   Age over 50 years old? 0   Neck circumference greater than 16 inches? 0   Gender male? 0   Total Score 1     Epworths Sleepiness Scale 7/26/2019   Sitting and reading 1   Watching TV 2   Sitting, inactive in a public place (e.g. a theatre or a meeting) 3   As a passenger in a car for an hour without a break 0   Lying down to rest in the afternoon when circumstances permit 3   Sitting and talking to someone 1   Sitting quietly after a lunch without alcohol 1   In a car, while stopped for a few minutes in traffic 1   Total score 12     Rooming 7/26/2019   Usual bedtime 11pm   Sleep Latency 40 minutes   Awakenings 5+   Wake Up Time 5:30am   Weekends varies   Energy Drinks 1 every other week   Coffee 2-3 cups qd   Cola no   Difficulty falling asleep Yes   Difficulty staying asleep Yes   Excessive daytime tiredness Yes   Excessive daytime sleepiness Yes   Dozing off while driving No   Shift Worker No   Sleep Walking? No   Sleep Talking? Yes   Kicking or punching? Yes   Restless legs symptoms No       Physical Exam:  /65   Pulse 73   Ht 5' 5\" (1.651 m)   Wt 173 lb (78.5 kg)   SpO2 97%   BMI 28.79 kg/m    BMI:Body mass index is 28.79 kg/m .   GEN: NAD, appropriate for age  Head: Normocephalic.  EYES: PERRLA, EOMI  ENT: Oropharynx is clear, Singh class 3+ airway. Uvula is intact  Nasal mucosa is moist without erythema  Neck : " "Thyroid is within normal limits.  Neck circumference 12.25\"  CV: Regular rate and rhythm, S1 & S2 positive.  LUNGS: Bilateral breathsounds heard.   ABDOMEN: Positive bowel sounds in all quadrants, soft, no rebound or guarding  MUSCULOSKELETAL: No leg swelling  SKIN: warm, dry, no rashes  Neurological: Alert, oriented to time, place, and person.  Psych: normal mood, normal affect     Labs/Studies:     No results found for: WBC, HGB, HCT, MCV, PLT      Chemistry    No results found for: NA, K, CL, CO2, BUN, CREATININE, GLU No results found for: CALCIUM, ALKPHOS, AST, ALT, BILITOT         No results found for: FERRITIN  Lab Results   Component Value Date    TSH 2.27 03/20/2019         Assessment and Plan:  In summary Enedelia De Jesus is a 33 y.o. year old female here for sleep disturbance.  1.  Hypersomnia   Ms. Enedelia De Jesus has high risk for obstructive sleep apnea based on the history of hypersomnia, snoring and a crowded airway. I educated the patient on the underlying pathophysiology of obstructive sleep apnea. We reviewed the risks associated with sleep apnea, including increased cardiovascular risk and overall death. We talked about treatments briefly. I recommend getting a Home sleep study. The patient should return to the clinic to discuss results and treatment option in a patient-centered approach.  The patient would like to think about it before making a final decision.  Patient decides to proceed with a sleep study, she is advised to call me so I can put in the order for the sleep study.  Strongly advised patient against drowsy driving at all costs.  2.  Snoring  3.  Other sleep disturbance    Patient verbalized understanding of these issues, agrees with the plan and all questions were answered today. Patient was given an opportuntity to voice any other symptoms or concerns not listed above. Patient did not have any other symptoms or concerns.         Favian Etienne DO  Board Certified in Internal " Medicine and Sleep Medicine  [facility].    (Note created with Dragon voice recognition and unintended spelling errors and word substitutions may occur)

## 2021-05-30 NOTE — PROGRESS NOTES
Internal Medicine Office Visit  [facility]  Patient Name: Enedelia De Jesus  Patient Age: 33 y.o.  YOB: 1985  MRN: [id number]    Date of Visit: 2019  Reason for Office Visit:   Chief Complaint   Patient presents with     Follow-up     Depression     Patient states that she is doing well. Thinks that the dose is working for her.            Assessment / Plan / Medical Decision Makin. Depression, unspecified depression type  Patient's depression remains stable recommend refill of her Zoloft 50 mg daily  - sertraline (ZOLOFT) 50 MG tablet; Take 1 tablet (50 mg total) by mouth daily.  Dispense: 90 tablet; Refill: 1    2. Sleeping difficulties  Sleep hygiene is reviewed with patient referrals been made to sleep medicine.  We also discussed current medications available via over-the-counter prescription to aid in sleep she declines at this time  - Ambulatory referral to Sleep Medicine    3. Irregular periods  Recommend follow-up with gynecology at this time.      Orders Placed This Encounter   Procedures     Ambulatory referral to Sleep Medicine   Followup: Return in about 6 months (around 2020). earlier if needed.    Health Maintenance Review  Health Maintenance   Topic Date Due     DEPRESSION FOLLOW UP  1984     TD 18+ HE  2002     TDAP ADULT ONE TIME DOSE  2002     ADVANCE DIRECTIVES DISCUSSED WITH PATIENT  2002     INFLUENZA VACCINE RULE BASED (1) 2019     PAP SMEAR  2021         I am having Enedelia De Jesus maintain her levothyroxine, (CHOLECALCIFEROL, VITAMIN D3, ORAL), pyridoxine (vitamin B6), levonorgestrel, and sertraline.      HPI:  Enedelia De Jesus is a 33 y.o. year old who presents to the office today Chronic conditions including depression, irregular menses, hypothyroidism, migraines, fatigue.  Patient was last seen in 2019 at which time she was started on Zoloft 50 mg daily and advised to  follow-up in 3 months.  Patient's previous PHQ 9 was 13 with a INA 7 score of 18.  Patient reports she feels her symptoms are well controlled on her current medication regime.  Her INA 7 score today of L4 and PHQ 9 score today of 8.  Patient denies any homicidal or suicidal thoughts.  Patient reports that she would like to continue on the Zoloft 50 mg daily.  She states that she has a new job though she is having extensive amount of hours she feels it is a good move.    Reports continued irregular periods she was seen by Dr. Jurado at which time an IUD was placed she reports continued difficulty with having menorrhagia in addition to regular.  Starting at times they are 2 weeks in length.  She has not contacted gynecology for follow-up    Continues to have difficulty with falling asleep. She reports she also up multiple times a night.  She is unsure if she snores.  She has daytime sleepiness.      Review of Systems- pertinent positive in bold:  Constitutional: Fever, chills, night sweats, fainting, weight change, fatigue, dizziness, sleeping difficulties, loud snoring/pauses in breathing  Eyes: change in vision, blurred or double vision, redness/eye pain  Ears, nose, mouth, throat: change in hearing, ear pain, hoarseness, difficulty swallowing, sores in the mouth or throat  Respiratory: shortness of breath, cough, bloody sputum, wheezing  Cardiovascular: chest pain, palpitations   Gastrointestinal: abdominal pain, heartburn/indigestion, nausea/vomiting, change in appetite, change in bowel habits, constipation or diarrhea, rectal bleeding/dark stools, difficulty swallowing  Urinary: painful urination, frequent urination, urinary urgency/incontinence, blood in urine/dark urine, nocturia (new or increasing), muscle cramps, swelling of hands, feet, ankles, leg pain/redness  Skin: change in moles/freckles, rash, nodules  Hematologic/lymphatic: swollen lymph glands, abnormal bruising/bleeding  Endocrine: excessive  "thirst/urination, cold or heat intolerance  Neurologic/emotional: worrisome memory change, numbness/tingling, anxiety, mood swings      Current Scheduled Meds:  Outpatient Encounter Medications as of 7/19/2019   Medication Sig Dispense Refill     CHOLECALCIFEROL, VITAMIN D3, ORAL Take by mouth.       levonorgestrel (MIRENA) 20 mcg/24 hours (5 yrs) 52 mg IUD 1 each by Intrauterine route once.       levothyroxine (SYNTHROID, LEVOTHROID) 75 MCG tablet Take 1 tablet by mouth daily.  3     pyridoxine, vitamin B6, (VITAMIN B-6) 250 MG tablet Take 250 mg by mouth daily.       sertraline (ZOLOFT) 50 MG tablet Take 1 tablet (50 mg total) by mouth daily. 90 tablet 1     [DISCONTINUED] sertraline (ZOLOFT) 50 MG tablet Take 1 tablet (50 mg total) by mouth daily. 30 tablet 1     No facility-administered encounter medications on file as of 7/19/2019.      Past Medical History:   Diagnosis Date     Depression      Fatigue      Hypothyroid      Migraine      History reviewed. No pertinent surgical history.  Social History     Tobacco Use     Smoking status: Never Smoker     Smokeless tobacco: Never Used   Substance Use Topics     Alcohol use: Not on file     Drug use: Not on file     Family History   Problem Relation Age of Onset     Cancer Brother          Tumor  in this thigh     Alcohol abuse Brother      Depression Brother      Drug abuse Brother      Early death Brother      Thyroid disease Maternal Grandmother      Arthritis Maternal Grandmother      Asthma Maternal Grandmother      Diabetes Maternal Grandmother      Arthritis Mother      Depression Mother      Alcohol abuse Father      Objective / Physical Examination:  Vitals:    07/19/19 1453   BP: 104/72   Pulse: 80   Resp: 24   Temp: 98.2  F (36.8  C)   SpO2: 97%   Weight: 175 lb (79.4 kg)   Height: 5' 5\" (1.651 m)     Wt Readings from Last 3 Encounters:   07/19/19 175 lb (79.4 kg)   06/10/19 172 lb 5 oz (78.2 kg)   04/26/19 170 lb (77.1 kg)     Body mass index is 29.12 " kg/m .     General Appearance: Alert and oriented, cooperative, affect appropriate, speech clear, in no apparent distress  Head: Normocephalic, atraumatic  Eyes:   Conjunctivae clear and sclerae non-icteric  Throat: Lips and mucosa moist.   Lungs:  Normal inspiratory and expiratory effort  Neuro: Alert and oriented, follows commands appropriately.     Nadya Villalpando, CNP

## 2021-05-31 NOTE — PROGRESS NOTES
"S: The patient is here in follow up of menorrhagia.  See note from 4/4/19.  She had a Mirena placed 4/16/19.  Over the last month she has had only 5 days without bleeding.  Half the days are like her period bleeding and half are spotting.  She has cramps daily.  Tylenol doesn't help anymore.  She also notes that she can't use her menstrual cup like before; it's harder to insert and comes out instead of staying in place.  She feels a lot of urinary frequency because of a pressure sensation.  She denies other UTI symptoms.  She notes that when she exercises she has increased bleeding and cramping.  She is also tired all the time.  She has not had an ultrasound.  She is frustrated.  She was on OCPs in the past, but she developed migraines with aura so she stopped them.    Outpatient Medications Prior to Visit   Medication Sig Dispense Refill     levonorgestrel (MIRENA) 20 mcg/24 hours (5 yrs) 52 mg IUD 1 each by Intrauterine route once.       levothyroxine (SYNTHROID, LEVOTHROID) 75 MCG tablet Take 1 tablet by mouth daily.  3     sertraline (ZOLOFT) 50 MG tablet Take 1 tablet (50 mg total) by mouth daily. 90 tablet 1     CHOLECALCIFEROL, VITAMIN D3, ORAL Take by mouth.       pyridoxine, vitamin B6, (VITAMIN B-6) 250 MG tablet Take 250 mg by mouth daily.       No facility-administered medications prior to visit.        Patient is allergic to motrin [ibuprofen].    O:  /60   Pulse 72   Ht 5' 5\" (1.651 m)   Wt 173 lb 12.8 oz (78.8 kg)   LMP 07/27/2019           Body mass index is 28.92 kg/m .    General: overweight WF, NAD    Assessment: 33 y.o. SWF P0 with menorrhagia.    Plan: we dicussed options of Depo Provera and progestin only OCPs, Lysteda, endometrial ablation, and hysterectomy.  Because she would like to keep fertility at this time, we discussed that ablation and hysterectomy are not good options.  Her sister had an ablation and had significant pain afterwards, leading to a hysterectomy.  Ultrasound was " ordered to make sure there isn't an anatomical reason for her abnormal bleeding.  If that's normal, she decided she would like to try progestin only pills to see if the added progesterone helps.  She wants to try this for about 3 months.  Approximately 15 minutes were spent with the patient with the majority in counseling.    8/2/19 Addendum: ultrasound with IUD in good position, 4 mm endometrium, normal ovaries bilaterally.

## 2021-06-02 VITALS — HEIGHT: 65 IN | BODY MASS INDEX: 28.16 KG/M2 | WEIGHT: 169 LBS

## 2021-06-02 VITALS — BODY MASS INDEX: 27.82 KG/M2 | WEIGHT: 167 LBS | HEIGHT: 65 IN

## 2021-06-02 VITALS — HEIGHT: 65 IN | BODY MASS INDEX: 28.82 KG/M2 | WEIGHT: 173 LBS

## 2021-06-02 NOTE — PROGRESS NOTES
"  Assessment/Plan:     1. Routine history and physical examination of adult  Updated immunizations  - Comprehensive Metabolic Panel  - HM1(CBC and Differential)  - Urinalysis-UC if Indicated  - Lipid Cascade RANDOM  - Glycosylated Hemoglobin A1c  - HM1 (CBC with Diff)    2. Depression, unspecified depression type  Continue on Zoloft 50 mg daily    3. Hypothyroidism, unspecified type  - Thyroid Stimulating Hormone (TSH)    4. High vitamin D level  - Vitamin D, Total (25-Hydroxy)    5. Elevated antinuclear antibody (LETICIA) level    - Ambulatory referral to Rheumatology    6. Menstrual pain  Recommend patient schedule follow-up with gynecology      Subjective:     Enedelia De Jesus is a 33 y.o. female who presents for an annual exam. Patient reports continued lower pelvic pain.  She is been seen by gynecology a couple of occasions at which time an IUD was placed in addition patient was given a prescription for oral contraceptives though she reports the abdominal pain is not improved.    Patient was having some pink-tinged urine here and they are noticed for the last week without dysuria or frequency.    Patient would like to be checked for diabetes today she has family history of diabetes and feels fatigued and lightheaded.    Patient reports \"night sweats\" for the last month.  She states she feels warm during the day which is new.  She reports no unintentional weight loss. She reports low appetite. She reports fatigue.     She reports \"a little cough\" but attributes this to the season. She reports chronic shortness of breath with activity.     She reports \"fluid retention\" in her fingers with salt intake.    Patient has a past history of positive LETICIA without resolved was seen by rheumatology though did     The patient reports that there is not domestic violence in her life.     Healthy Habits:   Regular Exercise: No  Sunscreen Use: Yes  Healthy Diet: Yes  Dental Visits Regularly: No  Sexually active: No  Mammogram: "   Colonoscopy: No  Prevention of Osteoporosis: Yes  Last Dexa: N/A    There is no immunization history for the selected administration types on file for this patient.  Immunization status: up to date and documented.    Gynecologic History  No LMP recorded. (Menstrual status: Contraception).  Contraception: IUD  Last Pap: 2016. Results were: normal HPV testing:   Last mammogram: N/A.    OB History   No obstetric history on file.       Current Outpatient Medications   Medication Sig Dispense Refill     levonorgestrel (MIRENA) 20 mcg/24 hours (5 yrs) 52 mg IUD 1 each by Intrauterine route once.       levothyroxine (SYNTHROID, LEVOTHROID) 75 MCG tablet Take 1 tablet (75 mcg total) by mouth daily. 90 tablet 3     norethindrone (MICRONOR) 0.35 mg tablet Take 1 tablet (0.35 mg total) by mouth daily. 84 tablet 4     sertraline (ZOLOFT) 50 MG tablet Take 1 tablet (50 mg total) by mouth daily. 90 tablet 1     No current facility-administered medications for this visit.      Past Medical History:   Diagnosis Date     Depression      Fatigue      Hypothyroid      Migraine      No past surgical history on file.  Motrin [ibuprofen]  Family History   Problem Relation Age of Onset     Cancer Brother          Tumor  in this thigh     Alcohol abuse Brother      Depression Brother      Drug abuse Brother      Early death Brother      Snoring Brother      Thyroid disease Maternal Grandmother      Arthritis Maternal Grandmother      Asthma Maternal Grandmother      Diabetes Maternal Grandmother      Arthritis Mother      Depression Mother      Alcohol abuse Father      Social History     Socioeconomic History     Marital status: Single     Spouse name: Not on file     Number of children: Not on file     Years of education: Not on file     Highest education level: Not on file   Occupational History     Not on file   Social Needs     Financial resource strain: Not on file     Food insecurity:     Worry: Not on file     Inability: Not on  "file     Transportation needs:     Medical: Not on file     Non-medical: Not on file   Tobacco Use     Smoking status: Never Smoker     Smokeless tobacco: Never Used   Substance and Sexual Activity     Alcohol use: Not on file     Drug use: Not on file     Sexual activity: Not on file   Lifestyle     Physical activity:     Days per week: Not on file     Minutes per session: Not on file     Stress: Not on file   Relationships     Social connections:     Talks on phone: Not on file     Gets together: Not on file     Attends Hindu service: Not on file     Active member of club or organization: Not on file     Attends meetings of clubs or organizations: Not on file     Relationship status: Not on file     Intimate partner violence:     Fear of current or ex partner: Not on file     Emotionally abused: Not on file     Physically abused: Not on file     Forced sexual activity: Not on file   Other Topics Concern     Not on file   Social History Narrative     Not on file       Review of Systems  General:  Negative except as noted above  Eyes: Negative except as noted above  Ears/Nose/Throat: Negative except as noted above  Cardiovascular: Negative except as noted above  Respiratory:  Negative except as noted above  Gastrointestinal:  Negative except as noted above  Musculoskeletal:  Negative except as noted above  Skin: Negative except as noted above  Neurologic: Negative except as noted above  Psychiatric: Negative except as noted above  Endocrine: Negative except as noted above  Heme/Lymphatic: Negative except as noted above   Allergic/Immunologic: Negative except as noted above      Objective:      Vitals:    10/18/19 0755   BP: 118/64   Pulse: 73   Resp: 20   Temp: 98.1  F (36.7  C)   SpO2: 99%   Weight: 175 lb (79.4 kg)   Height: 5' 5\" (1.651 m)     Wt Readings from Last 3 Encounters:   10/18/19 175 lb (79.4 kg)   07/30/19 173 lb 12.8 oz (78.8 kg)   07/26/19 173 lb (78.5 kg)     Body mass index is 29.12 kg/m . " (>25?)    Physical Exam:  General Appearance: Alert, cooperative, no distress.  Head: Normocephalic, without obvious abnormality, atraumatic  Eyes: PERRL, conjunctiva/corneas clear  Ears: Normal TM's and external ear canals, both ears  Nose: Nares normal, septum midline,mucosa normal, no drainage  Throat: Lips, mucosa, and tongue normal  Neck: Supple, symmetrical, trachea midline, no adenopathy;  thyroid: not enlarged, symmetric, no tenderness/mass/nodules  Lungs: Clear to auscultation bilaterally, respirations unlabored  Breasts: No breast masses, tenderness, asymmetry, or nipple discharge.  Heart: Regular rate and rhythm, S1 and S2 normal, no murmur, rub, or gallop  Abdomen: Soft, non-tender, bowel sounds active all four quadrants,  no masses, no organomegaly  Pelvic: Deferred.   Extremities: Extremities normal, atraumatic, no cyanosis or edema  Skin: Skin color, texture, turgor normal, no rashes or lesions  Lymph nodes: Cervical, supraclavicular, and axillary nodes normal  Neurologic: Normal  Psych: Normal affect.  Does not appear anxious or depressed.

## 2021-06-03 VITALS
HEIGHT: 65 IN | HEART RATE: 73 BPM | DIASTOLIC BLOOD PRESSURE: 64 MMHG | TEMPERATURE: 98.1 F | RESPIRATION RATE: 20 BRPM | WEIGHT: 175 LBS | BODY MASS INDEX: 29.16 KG/M2 | SYSTOLIC BLOOD PRESSURE: 118 MMHG | OXYGEN SATURATION: 99 %

## 2021-06-03 VITALS — HEIGHT: 65 IN | WEIGHT: 173 LBS | BODY MASS INDEX: 28.82 KG/M2

## 2021-06-03 VITALS — WEIGHT: 175 LBS | BODY MASS INDEX: 29.16 KG/M2 | HEIGHT: 65 IN

## 2021-06-03 VITALS — WEIGHT: 172.31 LBS | BODY MASS INDEX: 28.67 KG/M2

## 2021-06-03 VITALS — WEIGHT: 173.8 LBS | BODY MASS INDEX: 28.96 KG/M2 | HEIGHT: 65 IN

## 2021-06-03 VITALS — WEIGHT: 170 LBS | BODY MASS INDEX: 28.32 KG/M2 | HEIGHT: 65 IN

## 2021-06-04 VITALS
WEIGHT: 185 LBS | RESPIRATION RATE: 20 BRPM | DIASTOLIC BLOOD PRESSURE: 60 MMHG | HEART RATE: 79 BPM | BODY MASS INDEX: 30.82 KG/M2 | TEMPERATURE: 98.1 F | OXYGEN SATURATION: 100 % | HEIGHT: 65 IN | SYSTOLIC BLOOD PRESSURE: 108 MMHG

## 2021-06-04 VITALS
HEART RATE: 80 BPM | SYSTOLIC BLOOD PRESSURE: 104 MMHG | DIASTOLIC BLOOD PRESSURE: 64 MMHG | BODY MASS INDEX: 29.79 KG/M2 | WEIGHT: 179 LBS | OXYGEN SATURATION: 100 %

## 2021-06-04 VITALS
SYSTOLIC BLOOD PRESSURE: 115 MMHG | HEART RATE: 75 BPM | BODY MASS INDEX: 29.82 KG/M2 | WEIGHT: 179 LBS | DIASTOLIC BLOOD PRESSURE: 73 MMHG | HEIGHT: 65 IN | OXYGEN SATURATION: 98 %

## 2021-06-04 VITALS
SYSTOLIC BLOOD PRESSURE: 102 MMHG | WEIGHT: 177.3 LBS | HEART RATE: 66 BPM | BODY MASS INDEX: 29.5 KG/M2 | TEMPERATURE: 98.3 F | RESPIRATION RATE: 16 BRPM | DIASTOLIC BLOOD PRESSURE: 68 MMHG | OXYGEN SATURATION: 100 %

## 2021-06-04 VITALS — BODY MASS INDEX: 29.82 KG/M2 | HEIGHT: 65 IN | WEIGHT: 179 LBS

## 2021-06-04 VITALS
BODY MASS INDEX: 30.49 KG/M2 | RESPIRATION RATE: 20 BRPM | DIASTOLIC BLOOD PRESSURE: 60 MMHG | WEIGHT: 183 LBS | HEIGHT: 65 IN | OXYGEN SATURATION: 99 % | SYSTOLIC BLOOD PRESSURE: 122 MMHG | TEMPERATURE: 98 F | HEART RATE: 74 BPM

## 2021-06-04 VITALS
DIASTOLIC BLOOD PRESSURE: 74 MMHG | OXYGEN SATURATION: 98 % | RESPIRATION RATE: 20 BRPM | TEMPERATURE: 97.9 F | HEART RATE: 93 BPM | WEIGHT: 185 LBS | BODY MASS INDEX: 30.82 KG/M2 | HEIGHT: 65 IN | SYSTOLIC BLOOD PRESSURE: 112 MMHG

## 2021-06-04 VITALS
SYSTOLIC BLOOD PRESSURE: 110 MMHG | HEART RATE: 85 BPM | WEIGHT: 173 LBS | HEIGHT: 65 IN | RESPIRATION RATE: 20 BRPM | TEMPERATURE: 97.7 F | DIASTOLIC BLOOD PRESSURE: 62 MMHG | OXYGEN SATURATION: 96 % | BODY MASS INDEX: 28.82 KG/M2

## 2021-06-04 VITALS
HEART RATE: 88 BPM | WEIGHT: 175 LBS | BODY MASS INDEX: 29.16 KG/M2 | SYSTOLIC BLOOD PRESSURE: 100 MMHG | HEIGHT: 65 IN | DIASTOLIC BLOOD PRESSURE: 60 MMHG

## 2021-06-04 VITALS
HEIGHT: 65 IN | SYSTOLIC BLOOD PRESSURE: 114 MMHG | WEIGHT: 175 LBS | BODY MASS INDEX: 29.16 KG/M2 | DIASTOLIC BLOOD PRESSURE: 70 MMHG

## 2021-06-04 VITALS — HEIGHT: 65 IN | WEIGHT: 173 LBS | BODY MASS INDEX: 28.82 KG/M2

## 2021-06-04 NOTE — PROGRESS NOTES
ASSESSMENT AND PLAN:  Enedelia De Jesus 34 y.o. female is seen here on 12/05/19 for evaluation of chronic fatigue pain, nonrestorative sleep, positive LETICIA, history of mouth ulcers, leukopenia intermittently.  She has complex rheumatological symptoms that require careful evaluation.  We discussed the concept of positive LETICIA and a susceptibility to connective tissue disease.  We discussed the concept of undifferentiated connective tissue disease, fibromyalgia literature on the latter is provided.  Check labs as noted.She is currently on Zoloft, she is going to check with her primary physician if she can be given duloxetine instead.  Once work-up is complete we will get together further course to be charted accordingly.        .Diagnoses and all orders for this visit:    LETICIA positive  -     C-Reactive Protein  -     Erythrocyte Sedimentation Rate  -     Complement, C'4  -     Complement, C'3  -     HM1(CBC and Differential)  -     Hepatitis C Antibody (Anti-HCV)  -     ALT (SGPT)  -     Creatinine  -     Rheumatoid Factor Quant  -     CCP Antibodies  -     Antinuclear Antibody (LETICIA) Cascade  -     HM1 (CBC with Diff)    Myalgia      HISTORY OF PRESENTING ILLNESS:  Enedelia De Jesus, 34 y.o., female is here for evaluation of multiplicity of symptoms going on for the past several years.  She is a , non-smoker single.  She reports that as for as she can recall she has had generalized achiness fatigue poor quality of sleep.  Despite close questioning she was unable to remember if there was a time when she did not experience these symptoms.  Since her early adulthood she has become more aware.  She has been seen in rheumatology twice in the past in 2001 and more recently at [facility].  Her LETICIA has been positive, she has had negative double-stranded DNA it would appear that the ANKITA's were negative as were complements.  She has had longstanding leukopenia.  She has reported intermittent  mouth ulcers.  There is no consistent photosensitivity,, she has had chest pains with deep breath without features suggestive of pleuritic morphology.  She is never had seizures.  She does not have definite Raynaud's.  She has never been pregnant, by choice.  As far as she is aware there is been no kidney problems.  Over the years she has developed migraine, she has features suggestive of irritable bowel and irritable bladder syndrome.  Lites orders/smells are bothersome for her.  At times she would scratch her thigh to feel as if it it has dysesthesias.  At one point there was a question if she may have rheumatoid arthritis, she remembers that her right ring fingers PIP was swollen and painful.  She took gluten out of her diet and since then she feels that has shrunk in size and does not been a problem.  She does not have significant GI GERD symptoms.  Overall she noted her pain level to be 7.5, most of it across her shoulders back.  Morning stiffness no more than 20 minutes.  She has noted generalized weakness, weight gain recently 7 pounds, she gets itchy eyes and dryness, ringing in the ears, she has had dryness of nose and mouth.  She has noted hoarseness.  She gets cough, shortness of breath, nocturia.  Has had nausea.  She feels that she bruises easily.  She has had color changes in toes and fingers turning purple cold exposure.  She has had headaches dizziness.  She has noted poor quality of sleep, does not recall when was the last time she had a restorative sleep.  She has noted family history of lupus rheumatoid arthritis ankylosing spondylitis psoriasis ulcerative colitis Crohn's disease to be all negative.  .  Further historical information and ADL limitations as noted in the multidimensional health assessment questionnaire attached in the EMR. Rest of the 13 system ROS is negative.     ALLERGIES:Motrin [ibuprofen]    PAST MEDICAL/ACTIVE PROBLEMS/MEDICATION/ FAMILY HISTORY/SOCIAL DATA:  The patient has a  "family history of  Past Medical History:   Diagnosis Date     Depression      Fatigue      Hypothyroid      Migraine      Social History     Tobacco Use   Smoking Status Never Smoker   Smokeless Tobacco Never Used     Patient Active Problem List   Diagnosis     Depression, unspecified depression type     Routine history and physical examination of adult     Current Outpatient Medications   Medication Sig Dispense Refill     levonorgestrel (MIRENA) 20 mcg/24 hours (5 yrs) 52 mg IUD 1 each by Intrauterine route once.       levothyroxine (SYNTHROID, LEVOTHROID) 75 MCG tablet Take 1 tablet (75 mcg total) by mouth daily. 90 tablet 3     norethindrone (MICRONOR) 0.35 mg tablet Take 1 tablet (0.35 mg total) by mouth daily. 84 tablet 4     sertraline (ZOLOFT) 50 MG tablet Take 1 tablet (50 mg total) by mouth daily. 90 tablet 1     No current facility-administered medications for this visit.        COMPREHENSIVE EXAMINATION:  Vitals:    12/05/19 0815   BP: 100/60   Patient Site: Right Arm   Patient Position: Sitting   Cuff Size: Adult Large   Pulse: 88   Weight: 175 lb (79.4 kg)   Height: 5' 5\" (1.651 m)     A well appearing alert oriented female. Vital data as noted above. Her eyes without inflammation/scleromalacia. ENTwithout oral mucositis, thrush, nasal deformity, external ear redness, deformity. Her neck is without lymphadenopathy and supple. Lungs normal sounds, no pleural rub. Heart auscultation normal rate, rhythm; no pericardial rub and murmurs. Abdomen soft, non tender, no organomegaly. Skin examined for heliotrope, malar area eruption, lupus pernio, periungual erythema, sclerodactyly, papules, erythema nodosum, purpura, nail pitting, onycholysis, and obvious psoriasis lesion. Neurological examination shows normal alertness, speech, facial symmetry, tone and power in upper and lower extremities. The joint examination is performed for swelling, tenderness, warmth, erythema, and range of motion in the following " joints: DIPs, PIPs, MCPs, wrists, first CMC's, elbows, shoulders, hips, knees, ankles, feet; spine for range of motion and paraspinal muscles for tenderness. The salient  findings are:  She does not have synovitis in any of the palpable joints of upper and lower extremities, no dactylitis of digits or toes, she does not have sclerodactyly Maller area eruption periungual erythema.  She has modest tenderness about upper extremities proximally and distally and moderate tenderness across the trapezius along the paraspinal region, trochanteric areas.There is no pleuropericardial rub.  She does not have stomatitis.        LAB / IMAGING DATA:  ALT   Date Value Ref Range Status   10/18/2019 79 (H) 0 - 45 U/L Final     Albumin   Date Value Ref Range Status   10/18/2019 4.0 3.5 - 5.0 g/dL Final     Creatinine   Date Value Ref Range Status   10/18/2019 0.75 0.60 - 1.10 mg/dL Final       WBC   Date Value Ref Range Status   11/19/2019 4.3 4.0 - 11.0 thou/uL Final   10/18/2019 3.1 (L) 4.0 - 11.0 thou/uL Final     Hemoglobin   Date Value Ref Range Status   11/19/2019 13.9 12.0 - 16.0 g/dL Final   10/18/2019 13.6 12.0 - 16.0 g/dL Final     Platelets   Date Value Ref Range Status   11/19/2019 141 140 - 440 thou/uL Final   10/18/2019 128 (L) 140 - 440 thou/uL Final       No results found for: LETICIA, RF, SEDRATE

## 2021-06-04 NOTE — PROGRESS NOTES
Internal Medicine Office Visit  [facility]  Patient Name: Enedelia De Jesus  Patient Age: 34 y.o.  YOB: 1985  MRN: [id number]    Date of Visit: 2020  Reason for Office Visit:   Chief Complaint   Patient presents with     Back Pain     lower back pain for 3 weeks. No injuries. Pain has extednded down to both legs. Pain comes and goes. Back will lock up when moving in certain directions.            Assessment / Plan / Medical Decision Makin. Acute bilateral low back pain with left-sided sciatica  Given sudden onset of symptoms then recurrent loss of weightbearing ability of the left lower extremity MRI of the lumbar spine to be completed.  - MR Lumbar Spine Without Contrast; Future  - cyclobenzaprine (FLEXERIL) 5 MG tablet; Take 1 tablet (5 mg total) by mouth 3 (three) times a day as needed for muscle spasms.  Dispense: 30 tablet; Refill: 0 patient may continue to lyse Tylenol on a scheduled basis and certainly trial of heat or ice at 20-minute increments is encouraged.  - diazePAM (VALIUM) 5 MG tablet; Take 1 tablet (5 mg total) by mouth once in imaging for anxiety. Bring med with to MRI. You will need a  to bring you home after appt  Dispense: 1 tablet; Refill: 0      Orders Placed This Encounter   Procedures     MR Lumbar Spine Without Contrast   Followup: No follow-ups on file. earlier if needed.    Health Maintenance Review  Health Maintenance   Topic Date Due     DEPRESSION ACTION PLAN  1985     HPV TEST  1984     HIV SCREENING  1999     ADVANCE CARE PLANNING  2002     PREVENTIVE CARE VISIT  10/18/2020     PAP SMEAR  2021     TD 18+ HE  10/18/2029     INFLUENZA VACCINE RULE BASED  Completed     TDAP ADULT ONE TIME DOSE  Completed         I am having Enedelia De Jesus start on cyclobenzaprine and diazePAM. I am also having her maintain her levonorgestrel, norethindrone, levothyroxine, and DULoxetine.      HPI:  Enedelia  "Meche De Jesus is a 34 y.o. year old who presents to the office today with chronic conditions including hypothyroidism, back pain, positive LETICIA, depression, migraines.  Patient is being followed by rheumatology due to positive LETICIA with subsequently transitioned off of Zoloft to Cymbalta 20 mg daily.      Patient reports a 3 week history of low back pain without injury.  Pain is extended down both legs comes and goes back will \"lock up \"when moving in certain directions. She reports sudden loss of use of her left leg and her leg \"gave out\"  Reports she crawled to the wall.  She states the left leg did not drag behind her though she was unable to bare any weight on the left leg.  She denies any changes of bowel or bladder.  She reports some numbness and tingling sensation down the left greater than right legs    Review of Systems- pertinent positive in bold:  Constitutional: Fever, chills, night sweats, fainting, weight change, fatigue, dizziness, sleeping difficulties, loud snoring/pauses in breathing  Eyes: change in vision, blurred or double vision, redness/eye pain  Ears, nose, mouth, throat: change in hearing, ear pain, hoarseness, difficulty swallowing, sores in the mouth or throat  Respiratory: shortness of breath, cough, bloody sputum, wheezing  Cardiovascular: chest pain, palpitations   Gastrointestinal: abdominal pain, heartburn/indigestion, nausea/vomiting, change in appetite, change in bowel habits, constipation or diarrhea, rectal bleeding/dark stools, difficulty swallowing  Urinary: painful urination, frequent urination, urinary urgency/incontinence, blood in urine/dark urine, nocturia (new or increasing), muscle cramps, swelling of hands, feet, ankles, leg pain/redness  Skin: change in moles/freckles, rash, nodules  Hematologic/lymphatic: swollen lymph glands, abnormal bruising/bleeding  Endocrine: excessive thirst/urination, cold or heat intolerance  Neurologic/emotional: worrisome memory change, " numbness/tingling, anxiety, mood swings      Current Scheduled Meds:  Outpatient Encounter Medications as of 1/2/2020   Medication Sig Dispense Refill     DULoxetine (CYMBALTA) 20 MG capsule Take 1 capsule (20 mg total) by mouth daily. 30 capsule 1     levonorgestrel (MIRENA) 20 mcg/24 hours (5 yrs) 52 mg IUD 1 each by Intrauterine route once.       levothyroxine (SYNTHROID, LEVOTHROID) 75 MCG tablet Take 1 tablet (75 mcg total) by mouth daily. 90 tablet 3     norethindrone (MICRONOR) 0.35 mg tablet Take 1 tablet (0.35 mg total) by mouth daily. 84 tablet 4     cyclobenzaprine (FLEXERIL) 5 MG tablet Take 1 tablet (5 mg total) by mouth 3 (three) times a day as needed for muscle spasms. 30 tablet 0     diazePAM (VALIUM) 5 MG tablet Take 1 tablet (5 mg total) by mouth once in imaging for anxiety. Bring med with to MRI. You will need a  to bring you home after appt 1 tablet 0     No facility-administered encounter medications on file as of 1/2/2020.      Past Medical History:   Diagnosis Date     Depression      Fatigue      Hypothyroid      Migraine      No past surgical history on file.  Social History     Tobacco Use     Smoking status: Never Smoker     Smokeless tobacco: Never Used   Substance Use Topics     Alcohol use: Yes     Comment: rarely     Drug use: Not on file     Family History   Problem Relation Age of Onset     Cancer Brother          Tumor  in this thigh     Alcohol abuse Brother      Depression Brother      Drug abuse Brother      Early death Brother      Snoring Brother      Thyroid disease Maternal Grandmother      Arthritis Maternal Grandmother      Asthma Maternal Grandmother      Diabetes Maternal Grandmother      Arthritis Mother      Depression Mother      Alcohol abuse Father      Social History     Social History Narrative     Not on file       Objective / Physical Examination:  Vitals:    01/02/20 0826   BP: 112/74   Pulse: 93   Resp: 20   Temp: 97.9  F (36.6  C)   SpO2: 98%   Weight:  "185 lb (83.9 kg)   Height: 5' 5\" (1.651 m)     Wt Readings from Last 3 Encounters:   01/02/20 185 lb (83.9 kg)   12/05/19 175 lb (79.4 kg)   10/18/19 175 lb (79.4 kg)     Body mass index is 30.79 kg/m .     General Appearance: Alert and oriented, cooperative, affect appropriate, speech clear, in no apparent distress  Head: Normocephalic, atraumatic  Eyes: Conjunctivae clear and sclerae non-icteric  Throat: Lips and mucosa moist.   Lungs: Normal inspiratory and expiratory effort  Back: Assessed in the seated position normal curvature she has discomfort palpation of the lower lumbar spine as well as the SI joints bilaterally left greater than right negative straight leg bilaterally reflexes are 2+ and symmetrical strength is equal throughout  Extremities: Pulses 2+ and equal throughout. No edema. Strength equal throughout.  Integumentary: Warm and dry.   Neuro: Alert and oriented, follows commands appropriately.     No results found.  No results found for this or any previous visit (from the past 240 hour(s)).        Nadya Villalpando, CNP  "

## 2021-06-05 VITALS
BODY MASS INDEX: 31.99 KG/M2 | OXYGEN SATURATION: 100 % | DIASTOLIC BLOOD PRESSURE: 60 MMHG | TEMPERATURE: 97.5 F | RESPIRATION RATE: 24 BRPM | WEIGHT: 192 LBS | HEART RATE: 72 BPM | SYSTOLIC BLOOD PRESSURE: 110 MMHG | HEIGHT: 65 IN

## 2021-06-05 VITALS
DIASTOLIC BLOOD PRESSURE: 60 MMHG | HEIGHT: 65 IN | WEIGHT: 190 LBS | SYSTOLIC BLOOD PRESSURE: 110 MMHG | HEART RATE: 72 BPM | BODY MASS INDEX: 31.65 KG/M2 | RESPIRATION RATE: 14 BRPM

## 2021-06-05 VITALS
OXYGEN SATURATION: 99 % | HEIGHT: 65 IN | BODY MASS INDEX: 31.49 KG/M2 | WEIGHT: 189 LBS | HEART RATE: 81 BPM | SYSTOLIC BLOOD PRESSURE: 108 MMHG | DIASTOLIC BLOOD PRESSURE: 68 MMHG

## 2021-06-05 VITALS — HEIGHT: 65 IN | BODY MASS INDEX: 31.99 KG/M2 | WEIGHT: 192 LBS

## 2021-06-05 NOTE — PROGRESS NOTES
[facility]   [facility]-Pelvic Initial Evaluation    Patient Name: Enedelia De Jesus  Date of evaluation: 1/30/2020  Referral Diagnosis: Acute bilateral low back pain with left-sided sciatica  Referring provider: Nadya Villalpando,*  Visit Diagnosis:     ICD-10-CM    1. Acute bilateral low back pain without sciatica M54.5    2. Muscle weakness (generalized) M62.81        Assessment:     Enedelia De Jesus is a 34 y.o. female who presents to therapy today with chief complaints of acture low back pain that started in Dec 2019. Onset date of sx was sudent onset.  Pt reported h/o chronic back pain on and off and more pain with menstral cycles in the past.  Pain symptoms are aching pain with jabs of boubacar pains.  Functional impairments include walking, stting, sleeping, bending and lifting.  Pt will benefit from skilled therapy to increase ROM, increase stability in order to decrease pain and inflammation and return to prior level of function. .     Impairments in  pain, posture, ROM, joint mobility, strength  The POC is dynamic and will be modified on an ongoing basis.  Barriers to achieving goals as noted in the assessment section may affect outcome.  Prognosis to achieve goals is  good   Pt. is appropriate for skilled PT intervention as outlined in the Plan of Care (POC).  Pt. is a good candidate for skilled PT services to improve pain levels and function.    Goals:  Pt. will demonstrate/verbalize independence in self-management of condition in : 6 weeks  Pt. will be independent with home exercise program in : 6 weeks  Pt. will have improved quality of sleep: with less pain;waking less times/night;in 6 weeks  Pt. will be able to walk : 20 minutes;30 minutes;with less pain;with less difficulty;for community mobility;for exercise/recreation;in 6 weeks  Pt. will bend: to dress;to clean;with less pain;with less difficulty;for self care;for exercise/recreation;in 6 weeks    No data  recorded    Patient's expectations/goals are realistic.    Barriers to Learning or Achieving Goals:  Co-morbidities or other medical factors.  depression, fibromyalgia       Plan / Patient Instructions:        Plan of Care:   Authorization / Certification Start Date: 01/30/20  Communication with: Referral Source  Patient Related Instruction: Nature of Condition;Treatment plan and rationale;Self Care instruction;Basis of treatment;Body mechanics;Posture;Precautions;Next steps;Expected outcome  Times per Week: 1  Number of Weeks: 8-10  Number of Visits: up to 8  Discharge Planning: when goals have been met or a plateau in progress has been achived   Therapeutic Exercise: ROM;Stretching;Strengthening  Neuromuscular Reeducation: kinesio tape;posture;TNE;core  Manual Therapy: soft tissue mobilization;myofascial release;joint mobilization;muscle energy  Modalities: electrical stimulation;TENS;ultrasound;cold pack;hot pack      Plan for next visit: review HEP, advance as able.      Subjective:         Social information:   Living Situation:single family home, one level with the basement laundry room.    Occupation:   Work Status:Working full time   Equipment Available: None    History of Present Illness:    Enedelia is a 34 y.o. female who presents to therapy today with complains of acute low back pain that started in Dec 2019. The pain came on suddenly, very acute locking pain that then started to shoot pain in the legs. Then the pain changed to include more locking up of pain and then the left leg would give out. The patient states then it happened again about a week later when she bent over to get a dish out of the . She went to the MRI and had an MRI done, no acute findings for disc, fractures of degenerative changes in the spine. She does also report a tingling sensation in the middle of the thoracic spine that is constant and has been there for some time. No injuries to her back.       Pain  Ratin  Pain rating at best: 4  Pain rating at worst: 10  Pain description: aching, dull and occasional jabs of pain    Functional limitations are described as occurring with:   bending  lifting  repetitive movements  performing routine daily activities  sleeping  twisting or turning trunk    Patient reports benefit from:  rest  , pain medication, heat         Objective:      Note: Items left blank indicates the item was not performed or not indicated at the time of the evaluation.    Patient Outcome Measures :    Modified Oswestry Low Back Pain Disablity Questionnaire  in %: 66     Scores range from 0-100%, where a score of 0% represents minimal pain and maximal function. The minimal clinically important difference is a score reduction of 12%.    Examination  1. Acute bilateral low back pain without sciatica     2. Muscle weakness (generalized)       Involved side: Bilateral      Lumbar ROM:  2020  Date: 2020     *Indicate scale AROM AROM AROM   Lumbar Flexion To ankles, tight in legs     Lumbar Extension Min+ dec, pain central low back       Right Left Right Left Right Left   Lumbar Sidebending Min+ dec, pain right side  Min dec, pain L side       Lumbar Rotation WNL WNL pain L side        Thoracic Flexion      Thoracic Extension      Thoracic Sidebending         Thoracic Rotation           Lower Extremity Strength:   Able to heel and toe walk, some increase pain on right side with toe walking.   -  able to SL stance, increased pain in the low back on the side the leg off the ground is.   All motions are WNL bilaterally with MMT of the LEs, mild weakness of hip abduction and extension in prone and SL testing of these muscle groups, no increase in pain with MMT       Sensation    WNL to light touch    Reflex Testing - not tested today     Lumbar Special Tests:   2020  Lumbar Special Tests Right Left SI Tests Right  Left   Quadrant test   SI Compression     Straight leg raise - - SI Distraction      Crossover response - - POSH Test     Slump - - Sacral Thrust     Sit-up test  FADIR - -   Trunk extensor endurance test  Resisted Abduction - -   Prone instability test  Other: ALESHA - -   Pubic shotgun  Other:       Repeated Motion Testing:  Does not centralize  Does not peripheralize  Repeated extension does increase pain overall but does not create radicular pain     Passive Mobility - Joint Integrity:  Hypomobile  Hypermobile    Treatment Today   1/30/2020  TREATMENT MINUTES COMMENTS   Evaluation 30    Self-care/ Home management     Manual therapy     Neuromuscular Re-education     Therapeutic Activity     Therapeutic Exercises 23 Introduction to pain edcaution with information on inflammation and pain. Patient was accepting of this information.   instructed and performed HEP in clinic, handout given for home.   HEP   - reach and roll   - PPT   - TA set knee fall outs    Gait training     Modality__________________                Total 53    Blank areas are intentional and mean the treatment did not include these items.   PT Evaluation Code: (Please list factors)  Patient History/Comorbidities: as above   Examination: as above   Clinical Presentation: stable   Clinical Decision Making: low     Patient History/  Comorbidities Examination  (body structures and functions, activity limitations, and/or participation restrictions) Clinical Presentation Clinical Decision Making (Complexity)   No documented Comorbidities or personal factors 1-2 Elements Stable and/or uncomplicated Low   1-2 documented comorbidities or personal factor 3 Elements Evolving clinical presentation with changing characteristics Moderate   3-4 documented comorbidities or personal factors 4 or more Unstable and unpredictable High Emilia Gurrola, PT, DPT, CLT   1/30/2020  8:13 AM

## 2021-06-05 NOTE — PROGRESS NOTES
Internal Medicine Office Visit  [facility]  Patient Name: Enedelia De Jesus  Patient Age: 34 y.o.  YOB: 1985  MRN: [id number]    Date of Visit: 2020  Reason for Office Visit:   Chief Complaint   Patient presents with     Follow-up     depression follow up      Back Pain     Ongoing back pain. would like to discuss.            Assessment / Plan / Medical Decision Makin. Depression, unspecified depression type  Currently not well controlled we will increase duloxetine to 40 mg daily  - DULoxetine 40 mg CpDR; Take 40 mg by mouth daily.  Dispense: 30 capsule; Refill: 1    2. Acute bilateral low back pain with left-sided sciatica  Recommend continued utilization of alternate between Tylenol ibuprofen she was certainly may utilize some heat or ice at 20-minute increments  - Ambulatory referral to PT/OT  - methocarbamol (ROBAXIN) 500 MG tablet; Take 1 tablet (500 mg total) by mouth 4 (four) times a day.  Dispense: 30 tablet; Refill: 0      Orders Placed This Encounter   Procedures     Ambulatory referral to PT/OT   Followup: Return in about 2 weeks (around 2020). earlier if needed.    Health Maintenance Review  Health Maintenance   Topic Date Due     DEPRESSION ACTION PLAN  1985     HPV TEST  1985     HIV SCREENING  1999     ADVANCE CARE PLANNING  2002     PREVENTIVE CARE VISIT  10/18/2020     PAP SMEAR  2021     TD 18+ HE  10/18/2029     INFLUENZA VACCINE RULE BASED  Completed     TDAP ADULT ONE TIME DOSE  Completed         I have changed Enedelia De Jesus's DULoxetine. I am also having her start on methocarbamol. Additionally, I am having her maintain her levonorgestrel, norethindrone, levothyroxine, cyclobenzaprine, and diazePAM.      HPI:  Enedelia De Jesus is a 34 y.o. year old who presents to the office today for follow-up.  Patient has a diagnosis of fibromyalgia in addition to some back pain she was transitioned off  of her antidepressant to duloxetine secondary at the request of specialty care due to patient's diagnosis of fibromyalgia.  She has been started on duloxetine 20 mg daily she notes she has had worsening in mood without improvement in diffuse did muscle discomfort.  She is interested in increasing this medication.  For patient's low back pain she was prescribed Flexeril though she reports the Flexeril is not helping for back pain.  She has been using a heating pad with minimal relief.  She has tried 10mg of flexeril without relief. hypersensation in the legs bilaterally.  She reports muscle spasms in the legs. Patient did undergo an MRI of the lumbar spine which showed mild lower lumbar spondylosis without fracture or narrowing.      Review of Systems- pertinent positive in bold:  Constitutional: Fever, chills, night sweats, fainting, weight change, fatigue, dizziness, sleeping difficulties, loud snoring/pauses in breathing  Eyes: change in vision, blurred or double vision, redness/eye pain  Ears, nose, mouth, throat: change in hearing, ear pain, hoarseness, difficulty swallowing, sores in the mouth or throat  Respiratory: shortness of breath, cough, bloody sputum, wheezing  Cardiovascular: chest pain, palpitations   Gastrointestinal: abdominal pain, heartburn/indigestion, nausea/vomiting, change in appetite, change in bowel habits, constipation or diarrhea, rectal bleeding/dark stools, difficulty swallowing  Urinary: painful urination, frequent urination, urinary urgency/incontinence, blood in urine/dark urine, nocturia (new or increasing), muscle cramps, swelling of hands, feet, ankles, leg pain/redness  Skin: change in moles/freckles, rash, nodules  Hematologic/lymphatic: swollen lymph glands, abnormal bruising/bleeding  Endocrine: excessive thirst/urination, cold or heat intolerance  Neurologic/emotional: worrisome memory change, numbness/tingling, anxiety, mood swings      Current Scheduled Meds:  Outpatient  Encounter Medications as of 1/21/2020   Medication Sig Dispense Refill     diazePAM (VALIUM) 5 MG tablet Take 1 tablet (5 mg total) by mouth once in imaging for anxiety. Bring med with to MRI. You will need a  to bring you home after appt 1 tablet 0     DULoxetine 40 mg CpDR Take 40 mg by mouth daily. 30 capsule 1     levonorgestrel (MIRENA) 20 mcg/24 hours (5 yrs) 52 mg IUD 1 each by Intrauterine route once.       levothyroxine (SYNTHROID, LEVOTHROID) 75 MCG tablet Take 1 tablet (75 mcg total) by mouth daily. 90 tablet 3     norethindrone (MICRONOR) 0.35 mg tablet Take 1 tablet (0.35 mg total) by mouth daily. 84 tablet 4     [DISCONTINUED] DULoxetine (CYMBALTA) 20 MG capsule Take 1 capsule (20 mg total) by mouth daily. 30 capsule 1     cyclobenzaprine (FLEXERIL) 5 MG tablet Take 1 tablet (5 mg total) by mouth 3 (three) times a day as needed for muscle spasms. 30 tablet 0     methocarbamol (ROBAXIN) 500 MG tablet Take 1 tablet (500 mg total) by mouth 4 (four) times a day. 30 tablet 0     No facility-administered encounter medications on file as of 1/21/2020.      Past Medical History:   Diagnosis Date     Depression      Fatigue      Hypothyroid      Migraine      No past surgical history on file.  Social History     Tobacco Use     Smoking status: Never Smoker     Smokeless tobacco: Never Used   Substance Use Topics     Alcohol use: Yes     Comment: rarely     Drug use: Not on file     Family History   Problem Relation Age of Onset     Cancer Brother          Tumor  in this thigh     Alcohol abuse Brother      Depression Brother      Drug abuse Brother      Early death Brother      Snoring Brother      Thyroid disease Maternal Grandmother      Arthritis Maternal Grandmother      Asthma Maternal Grandmother      Diabetes Maternal Grandmother      Arthritis Mother      Depression Mother      Alcohol abuse Father      Social History     Social History Narrative     Not on file       Objective / Physical  "Examination:  Vitals:    01/21/20 1737   BP: 108/60   Pulse: 79   Resp: 20   Temp: 98.1  F (36.7  C)   SpO2: 100%   Weight: 185 lb (83.9 kg)   Height: 5' 5\" (1.651 m)     Wt Readings from Last 3 Encounters:   01/21/20 185 lb (83.9 kg)   01/02/20 185 lb (83.9 kg)   12/05/19 175 lb (79.4 kg)     Body mass index is 30.79 kg/m .     General Appearance: Alert and oriented, cooperative, affect appropriate, speech clear, in no apparent distress  Head: Normocephalic, atraumatic  Eyes:   Conjunctivae clear and sclerae non-icteric  Throat: Lips and mucosa moist.   Lungs:Normal inspiratory and expiratory effort  Integumentary: Warm and dry.   Neuro: Alert and oriented, follows commands appropriately.     Mr Lumbar Spine Without Contrast    Result Date: 1/10/2020  EXAM: MR LUMBAR SPINE WO CONTRAST LOCATION: [facility] DATE/TIME: 1/10/2020 5:14 PM INDICATION: Back pain, progressive neurologic deficit. TECHNIQUE: Routine. CONTRAST: None. COMPARISON: None. FINDINGS: Nomenclature is based on 5 lumbar type vertebral bodies. Normal vertebral body heights, alignment and marrow signal. Normal distal spinal cord and cauda equina with conus medullaris at L1-L2. No visible extraspinal abnormality. Unremarkable visualized bony pelvis. T12-L1: Normal disc height and signal. No herniation. No facet arthropathy. No spinal canal stenosis. No right neural foraminal stenosis. No left neural foraminal stenosis. L1-L2: Normal disc height and signal. No herniation. No facet arthropathy. No spinal canal stenosis. No right neural foraminal stenosis. No left neural foraminal stenosis. L2-L3: Normal disc height and signal. No herniation. No facet arthropathy. No spinal canal stenosis. No right neural foraminal stenosis. No left neural foraminal stenosis. L3-L4: Normal disc height and signal. No herniation. No facet arthropathy. No spinal canal stenosis. No right neural foraminal stenosis. No left neural foraminal stenosis. L4-L5: Normal disc " height and signal. No herniation. Minimal facet arthropathy. No spinal canal stenosis. No right neural foraminal stenosis. No left neural foraminal stenosis. L5-S1: Minimal loss of disc space height and signal posteriorly. Mild disc bulging with tiny midline protrusion. Mild facet arthropathy. No spinal canal stenosis. No right neural foraminal stenosis. No left neural foraminal stenosis.     CONCLUSION: 1.  Mild lower lumbar spondylosis. No canal or foraminal narrowing. 2.  No fracture.    No results found for this or any previous visit (from the past 240 hour(s)).    Little interest or pleasure in doing things: Several days  Feeling down, depressed, or hopeless: Nearly every day  Trouble falling or staying asleep, or sleeping too much: Nearly every day  Feeling tired or having little energy: Nearly every day  Poor appetite or overeating: More than half the days  Feeling bad about yourself - or that you are a failure or have let yourself or your family down: Not at all  Trouble concentrating on things, such as reading the newspaper or watching television: Several days  Moving or speaking so slowly that other people could have noticed. Or the opposite - being so fidgety or restless that you have been moving around a lot more than usual: Several days  Thoughts that you would be better off dead, or of hurting yourself in some way: Not at all  PHQ-9 Total Score: 14  If you checked off any problems, how difficult have these problems made it for you to do your work, take care of things at home, or get along with other people?: Very difficult  Feeling nervous, anxious or on edge: 3 (1/21/2020  5:00 PM)  Not being able to stop or control worry: 1 (1/21/2020  5:00 PM)  Worrying too much about different things: 2 (1/21/2020  5:00 PM)  Trouble relaxing: 3 (1/21/2020  5:00 PM)  Being so restless that is is hard to sit still: 1 (1/21/2020  5:00 PM)  Becoming easily annnoyed or irritable: 2 (1/21/2020  5:00 PM)  Feeling afraid  as if something awful might happen: 0 (1/21/2020  5:00 PM)  INA-7 Total: 12 (1/21/2020  5:00 PM)  How difficult did these problems make it for you to do your work, take care of things at home or get along with other people? : Somewhat difficult (1/21/2020  5:00 PM)  How difficult did these problems make it for you to do your work, take care of things at home or get along with other people? : Somewhat difficult (1/21/2020  5:00 PM)        Nadya Villalpando, CNP

## 2021-06-05 NOTE — PROGRESS NOTES
"ASSESSMENT AND PLAN:  Enedelia Meceh De Jesus 34 y.o. female is seen here on 01/23/20 for follow-up for recent visit for positive LETICIA, generalized myalgias polyarthralgias chronic fatigue and nonrestorative sleep.  She has had intermittent leukopenia.  She is now on duloxetine.  She does not think it has done much for her she had the dose increased yesterday by her primary physician to 40 mg daily.  She noted \"severe\" pain in the wrists where, reassuringly, neither clinical nor ultrasonographic examination reveals evidence of inflammation.  At this point one possible exploratory option is to consider trial of hydroxychloroquine for some of the findings such as leukopenia, intermittent stomatitis.  However will wait to see if the increase duloxetine provides any significant relief for her generalized pain, polyarthralgias.  We will meet here in 3 months.         .Diagnoses and all orders for this visit:    Myalgia  -     HM1(CBC and Differential); Future; Expected date: 04/23/2020  -     Creatinine; Future; Expected date: 04/23/2020  -     ALT (SGPT); Future; Expected date: 04/23/2020  -     Albumin; Future; Expected date: 04/23/2020  -     DNA (ds) Antibody Screen; Future; Expected date: 04/23/2020  -     Complement, C'3; Future; Expected date: 04/23/2020  -     Complement, C'4; Future; Expected date: 04/23/2020    LETICIA positive  -     HM1(CBC and Differential); Future; Expected date: 04/23/2020  -     Creatinine; Future; Expected date: 04/23/2020  -     ALT (SGPT); Future; Expected date: 04/23/2020  -     Albumin; Future; Expected date: 04/23/2020  -     DNA (ds) Antibody Screen; Future; Expected date: 04/23/2020  -     Complement, C'3; Future; Expected date: 04/23/2020  -     Complement, C'4; Future; Expected date: 04/23/2020    Polyarthralgia  -     HM1(CBC and Differential); Future; Expected date: 04/23/2020  -     Creatinine; Future; Expected date: 04/23/2020  -     ALT (SGPT); Future; Expected date: 04/23/2020  -    "  Albumin; Future; Expected date: 04/23/2020  -     DNA (ds) Antibody Screen; Future; Expected date: 04/23/2020  -     Complement, C'3; Future; Expected date: 04/23/2020  -     Complement, C'4; Future; Expected date: 04/23/2020    Lymphocytopenia      HISTORY OF PRESENTING ILLNESS:  Enedelia De Jesus, 34 y.o., female is here for follow-up of recent visit for evaluation of multiplicity of symptoms going on for the past several years.  Recent work-up shows positive SSA antibodies.  Double-stranded DNA were negative.  Her complements were normal.  Her leukocyte count has fluctuated it in the range.  On 2 occasions she had lymphocytopenia.  She is a , non-smoker single.  She reports that as for as she can recall she has had generalized achiness fatigue poor quality of sleep.  Despite close questioning she was unable to remember if there was a time when she did not experience these symptoms.  Since her early adulthood she has become more aware.  She has been seen in rheumatology twice in the past in 2001 and more recently at [facility].  Her LETICIA has been positive, she has had negative double-stranded DNA it would appear that the ANKITA's were negative as were complements.  She has had longstanding leukopenia.  She has reported intermittent mouth ulcers.  There is no consistent photosensitivity,, she has had chest pains with deep breath without features suggestive of pleuritic morphology.  She is never had seizures.  She does not have definite Raynaud's.  She has never been pregnant, by choice.  As far as she is aware there is been no kidney problems.  Over the years she has developed migraine, she has features suggestive of irritable bowel and irritable bladder syndrome.  Lites orders/smells are bothersome for her.  At times she would scratch her thigh to feel as if it it has dysesthesias.  At one point there was a question if she may have rheumatoid arthritis, she remembers that her  right ring fingers PIP was swollen and painful.  She took gluten out of her diet and since then she feels that has shrunk in size and does not been a problem.  She does not have significant GI GERD symptoms.  Overall she noted her pain level to be 7.5, most of it across her shoulders back.  Morning stiffness no more than 20 minutes.  She has noted generalized weakness, weight gain recently 7 pounds, she gets itchy eyes and dryness, ringing in the ears, she has had dryness of nose and mouth.  She has noted hoarseness.  She gets cough, shortness of breath, nocturia.  Has had nausea.  She feels that she bruises easily.  She has had color changes in toes and fingers turning purple cold exposure.  She has had headaches dizziness.  She has noted poor quality of sleep, does not recall when was the last time she had a restorative sleep.  She has noted family history of lupus rheumatoid arthritis ankylosing spondylitis psoriasis ulcerative colitis Crohn's disease to be all negative.  .  Further historical information and ADL limitations as noted in the multidimensional health assessment questionnaire attached in the EMR.   ALLERGIES:Motrin [ibuprofen]    PAST MEDICAL/ACTIVE PROBLEMS/MEDICATION/ FAMILY HISTORY/SOCIAL DATA:  The patient has a family history of  Past Medical History:   Diagnosis Date     Depression      Fatigue      Hypothyroid      Migraine      Social History     Tobacco Use   Smoking Status Never Smoker   Smokeless Tobacco Never Used     Patient Active Problem List   Diagnosis     Depression, unspecified depression type     Routine history and physical examination of adult     Myalgia     Current Outpatient Medications   Medication Sig Dispense Refill     DULoxetine 40 mg CpDR Take 40 mg by mouth daily. 30 capsule 1     levonorgestrel (MIRENA) 20 mcg/24 hours (5 yrs) 52 mg IUD 1 each by Intrauterine route once.       levothyroxine (SYNTHROID, LEVOTHROID) 75 MCG tablet Take 1 tablet (75 mcg total) by mouth  "daily. 90 tablet 3     methocarbamol (ROBAXIN) 500 MG tablet Take 1 tablet (500 mg total) by mouth 4 (four) times a day. 30 tablet 0     norethindrone (MICRONOR) 0.35 mg tablet Take 1 tablet (0.35 mg total) by mouth daily. 84 tablet 4     cyclobenzaprine (FLEXERIL) 5 MG tablet Take 1 tablet (5 mg total) by mouth 3 (three) times a day as needed for muscle spasms. 30 tablet 0     diazePAM (VALIUM) 5 MG tablet Take 1 tablet (5 mg total) by mouth once in imaging for anxiety. Bring med with to MRI. You will need a  to bring you home after appt 1 tablet 0     No current facility-administered medications for this visit.      DETAILED EXAMINATION  01/23/20  :  Vitals:    01/23/20 0822   BP: 114/70   Patient Site: Right Arm   Patient Position: Sitting   Cuff Size: Adult Regular   Weight: 175 lb (79.4 kg)   Height: 5' 5\" (1.651 m)     Alert oriented. Head including the face is examined for malar rash, heliotropes, scarring, lupus pernio. Eyes examined for redness such as in episcleritis/scleritis, periorbital lesions.   Neck/ Face examined for parotid gland swelling, range of motion of neck.  Left upper and lower and right upper and lower extremities examined for tenderness, swelling, warmth of the appendicular joints, range of motion, edema, rash.  Some of the important findings included: she does not have evidence of synovitis in the palpable joints of the upper extremities.  Widespread tenderness across trapezius along the paraspinal region trochanteric areas.  There is no synovitis in the wrists on either side.  Ultrasound examination of the wrist both along the flexor tendons and dorsally reveals no evidence of Sue synovitis synovitis effusion or Doppler effect.   She does not have stomatitis.        LAB / IMAGING DATA:  ALT   Date Value Ref Range Status   12/05/2019 24 0 - 45 U/L Final   10/18/2019 79 (H) 0 - 45 U/L Final     Albumin   Date Value Ref Range Status   10/18/2019 4.0 3.5 - 5.0 g/dL Final "     Creatinine   Date Value Ref Range Status   12/05/2019 0.73 0.60 - 1.10 mg/dL Final   10/18/2019 0.75 0.60 - 1.10 mg/dL Final       WBC   Date Value Ref Range Status   12/05/2019 3.8 (L) 4.0 - 11.0 thou/uL Final   11/19/2019 4.3 4.0 - 11.0 thou/uL Final     Hemoglobin   Date Value Ref Range Status   12/05/2019 13.1 12.0 - 16.0 g/dL Final   11/19/2019 13.9 12.0 - 16.0 g/dL Final   10/18/2019 13.6 12.0 - 16.0 g/dL Final     Platelets   Date Value Ref Range Status   12/05/2019 153 140 - 440 thou/uL Final   11/19/2019 141 140 - 440 thou/uL Final   10/18/2019 128 (L) 140 - 440 thou/uL Final       Lab Results   Component Value Date    RF 36.9 (H) 12/05/2019    SEDRATE 12 12/05/2019

## 2021-06-06 NOTE — PROGRESS NOTES
"Internal Medicine Office Visit  [facility]  Patient Name: Enedelia De Jesus  Patient Age: 34 y.o.  YOB: 1985  MRN: [id number]    Date of Visit: 3/3/2020  Reason for Office Visit:   Chief Complaint   Patient presents with     Back Issues     Tingling and numbness in upper back. Ongoing issue for awhile. PT seems to cause the issues in the upper back from the exercises for the lower back.      Abdominal Cramping     Has the feeling of an upset stomach. Sometimes has to use the rest room as soon as she is done eating. BM are really soft and lighter in color.            Assessment / Plan / Medical Decision Makin. Change in bowel movement  - Ambulatory referral to Gastroenterology    2. Thoracic back sprain, sequela  - Ambulatory referral to Neurosurgery      Orders Placed This Encounter   Procedures     Ambulatory referral to Neurosurgery     Ambulatory referral to Gastroenterology   Followup: Return in about 4 weeks (around 3/31/2020). earlier if needed.    Health Maintenance Review  Health Maintenance   Topic Date Due     DEPRESSION ACTION PLAN  1984     HIV SCREENING  1999     ADVANCE CARE PLANNING  2002     PAP SMEAR  2019     PREVENTIVE CARE VISIT  10/18/2020     TD 18+ HE  10/18/2029     INFLUENZA VACCINE RULE BASED  Completed     TDAP ADULT ONE TIME DOSE  Completed         I am having Enedelia De Jesus maintain her levonorgestreL, norethindrone, levothyroxine, cyclobenzaprine, diazePAM, DULoxetine, and methocarbamoL.      HPI:  Enedelia D eJesus is a 34 y.o. year old who presents to the office today with chronic conditions including  hypothyroidism, back pain, positive LETICIA, depression, migraines, fibromyalgia.    Thoracic back pain with \"cold and numb\" midback.  She reports symptoms worsen with improved posture.     Patient reports having \"emergency poops\".  Stool is thinner then previous and is soft this has worsened over the " last couple of months.  Will eat and then will get an upset stomach.  She reports physical stress will cause abdominal discomfort.  She has eliminated gluten and feels she may have a problem with apples,peppers, aversion to chocolate, sensitivity to sugars and salts noting swelling.  She states she develops flu like symptoms with consumption of gluten.  Patient reports having some mucus anal discharge.  She also reports having abdominal discomfort when she washes her bottom.    Review of Systems- pertinent positive in bold:  Constitutional: Fever, chills, night sweats, fainting, weight change, fatigue, dizziness, sleeping difficulties, loud snoring/pauses in breathing  Eyes: change in vision, blurred or double vision, redness/eye pain  Ears, nose, mouth, throat: change in hearing, ear pain, hoarseness, difficulty swallowing, sores in the mouth or throat  Respiratory: shortness of breath, cough, bloody sputum, wheezing  Cardiovascular: chest pain, palpitations   Gastrointestinal: abdominal pain, heartburn/indigestion, nausea/vomiting, change in appetite, change in bowel habits, constipation or diarrhea, rectal bleeding/dark stools, difficulty swallowing  Urinary: painful urination, frequent urination, urinary urgency/incontinence, blood in urine/dark urine, nocturia (new or increasing), muscle cramps, swelling of hands, feet, ankles, leg pain/redness  Skin: change in moles/freckles, rash, nodules  Hematologic/lymphatic: swollen lymph glands, abnormal bruising/bleeding  Endocrine: excessive thirst/urination, cold or heat intolerance  Neurologic/emotional: worrisome memory change, numbness/tingling, anxiety, mood swings      Current Scheduled Meds:  Outpatient Encounter Medications as of 3/3/2020   Medication Sig Dispense Refill     cyclobenzaprine (FLEXERIL) 5 MG tablet Take 1 tablet (5 mg total) by mouth 3 (three) times a day as needed for muscle spasms. 30 tablet 0     diazePAM (VALIUM) 5 MG tablet Take 1 tablet (5  "mg total) by mouth once in imaging for anxiety. Bring med with to MRI. You will need a  to bring you home after appt 1 tablet 0     DULoxetine 40 mg CpDR Take 40 mg by mouth daily. 30 capsule 1     levonorgestrel (MIRENA) 20 mcg/24 hours (5 yrs) 52 mg IUD 1 each by Intrauterine route once.       levothyroxine (SYNTHROID, LEVOTHROID) 75 MCG tablet Take 1 tablet (75 mcg total) by mouth daily. 90 tablet 3     methocarbamol (ROBAXIN) 500 MG tablet Take 1 tablet (500 mg total) by mouth 4 (four) times a day. 30 tablet 0     norethindrone (MICRONOR) 0.35 mg tablet Take 1 tablet (0.35 mg total) by mouth daily. 84 tablet 4     No facility-administered encounter medications on file as of 3/3/2020.      Past Medical History:   Diagnosis Date     Depression      Fatigue      Hypothyroid      Migraine      No past surgical history on file.  Social History     Tobacco Use     Smoking status: Never Smoker     Smokeless tobacco: Never Used   Substance Use Topics     Alcohol use: Yes     Comment: rarely     Drug use: Not on file     Family History   Problem Relation Age of Onset     Cancer Brother          Tumor  in this thigh     Alcohol abuse Brother      Depression Brother      Drug abuse Brother      Early death Brother      Snoring Brother      Thyroid disease Maternal Grandmother      Arthritis Maternal Grandmother      Asthma Maternal Grandmother      Diabetes Maternal Grandmother      Arthritis Mother      Depression Mother      Alcohol abuse Father      Social History     Social History Narrative     Not on file       Objective / Physical Examination:  Vitals:    03/03/20 1808   BP: 122/60   Pulse: 74   Resp: 20   Temp: 98  F (36.7  C)   SpO2: 99%   Weight: 183 lb (83 kg)   Height: 5' 5\" (1.651 m)     Wt Readings from Last 3 Encounters:   03/03/20 183 lb (83 kg)   01/23/20 175 lb (79.4 kg)   01/21/20 185 lb (83.9 kg)     Body mass index is 30.45 kg/m .     General Appearance: Alert and oriented, cooperative, affect " appropriate, speech clear, in no apparent distress  Head: Normocephalic, atraumatic  Eyes:Conjunctivae clear and sclerae non-icteric  Throat: Lips and mucosa moist.   Lungs: Normal inspiratory and expiratory effort  Back: Assessed in the seated position she has some discomfort palpation of the thoracic spine she has good range of motion  Abdomen: Bowel sounds active all four quadrants. Soft, diffuse tenderness throughout.  No hepatomegaly or splenomegaly.   Extremities: Pulses 2+ and equal throughout. No edema. Strength equal throughout.  Integumentary: Warm and dry.   Neuro: Alert and oriented, follows commands appropriately.     No results found.  No results found for this or any previous visit (from the past 240 hour(s)).        Nadya Villalpando, CNP

## 2021-06-06 NOTE — PROGRESS NOTES
[facility] Rehabilitation Daily Progress     Patient Name: Enedelia De Jesus  Date: 2020  Visit #: 2  PTA visit #:    Date of evaluation: 2020  Referral Diagnosis: Acute bilateral low back pain with left-sided sciatica  Referring provider: Nadya Villalpando,*  Visit Diagnosis:     ICD-10-CM    1. Acute bilateral low back pain without sciatica M54.5    2. Muscle weakness (generalized) M62.81      Initial Assessment   Enedelia De Jesus is a 34 y.o. female who presents to therapy today with chief complaints of acture low back pain that started in Dec 2019. Onset date of sx was sudent onset.  Pt reported h/o chronic back pain on and off and more pain with menstral cycles in the past.  Pain symptoms are aching pain with jabs of boubacar pains.  Functional impairments include walking, stting, sleeping, bending and lifting.  Pt will benefit from skilled therapy to increase ROM, increase stability in order to decrease pain and inflammation and return to prior level of function.     Assessment:     HEP/POC compliance is  good .  Patient demonstrates understanding/independence with home program.    Goal Status:  Pt. will demonstrate/verbalize independence in self-management of condition in : 6 weeks  Pt. will be independent with home exercise program in : 6 weeks  Pt. will have improved quality of sleep: with less pain;waking less times/night;in 6 weeks  Pt. will be able to walk : 20 minutes;30 minutes;with less pain;with less difficulty;for community mobility;for exercise/recreation;in 6 weeks  Pt. will bend: to dress;to clean;with less pain;with less difficulty;for self care;for exercise/recreation;in 6 weeks    No data recorded    Plan / Patient Education:     Continue with initial plan of care.  Progress with home program as tolerated.  Plan for next visit: continue manual therapy as needed and review HEP, advance as able.   Subjective:     Pain Ratin  Pain in the lower spine near the tailbone.  "Most of pain is when sitting, she can sit for about 40 min and then she is sore for the day. She has tried to get up earlier to decrease pain but has not noticed a change overall in the pain but the end of a work day.   Pain with twisting fast.       Objective:     Lumbar ROM   Flexion -   Extension -   Rotation -   SB -     HEP   - reach and roll   - PPT   - TA set knee fall outs     Treatment Today   2/13/2020  TREATMENT MINUTES COMMENTS   Evaluation     Self-care/ Home management     Manual therapy 25  SL lumbar rotational mobs grade II   AP mobs of the pelvis in SL  STM/MFR to the QL on the right side    Neuromuscular Re-education     Therapeutic Activity     Therapeutic Exercises 15  NuStep WL 5 x 7 min   Reviewed HEP as above   Performed in clinic   - PPT x 5 reps   - bridges 5\" hold x 10 reps  - TA set L2 band hip abduction unilaterally alternating sides x 10 reps each    Gait training     Modality__________________                Total 40     Blank areas are intentional and mean the treatment did not include these items.     Emilia Gurrola, PT, DPT, CLT   2/13/2020    "

## 2021-06-06 NOTE — PROGRESS NOTES
[facility] Rehabilitation Daily Progress     Patient Name: Enedelia De Jesus  Date: 3/5/2020  Visit #: 3  PTA visit #:    Date of evaluation: 1/30/2020  Referral Diagnosis: Acute bilateral low back pain with left-sided sciatica  Referring provider: Nadya Villalpando,*  Visit Diagnosis:     ICD-10-CM    1. Acute bilateral low back pain without sciatica M54.5    2. Muscle weakness (generalized) M62.81      Initial Assessment   Enedelia De Jesus is a 34 y.o. female who presents to therapy today with chief complaints of acture low back pain that started in Dec 2019. Onset date of sx was sudent onset.  Pt reported h/o chronic back pain on and off and more pain with menstral cycles in the past.  Pain symptoms are aching pain with jabs of boubacar pains.  Functional impairments include walking, stting, sleeping, bending and lifting.  Pt will benefit from skilled therapy to increase ROM, increase stability in order to decrease pain and inflammation and return to prior level of function.     Assessment:   The pain is improved after manual therapy. Exercises remain challenging and appropriate.   HEP/POC compliance is  good .  Patient demonstrates understanding/independence with home program.    Goal Status:  Pt. will demonstrate/verbalize independence in self-management of condition in : 6 weeks  Pt. will be independent with home exercise program in : 6 weeks  Pt. will have improved quality of sleep: with less pain;waking less times/night;in 6 weeks  Pt. will be able to walk : 20 minutes;30 minutes;with less pain;with less difficulty;for community mobility;for exercise/recreation;in 6 weeks  Pt. will bend: to dress;to clean;with less pain;with less difficulty;for self care;for exercise/recreation;in 6 weeks    No data recorded    Plan / Patient Education:     Continue with initial plan of care.  Progress with home program as tolerated.  Plan for next visit:  continue manual therapy as needed and review HEP, advance  "as able.   Subjective:     Pain Ratin   She had been sick with the flu.   She has been still having some pain in the low back, which increased with laying down or sitting.       Objective:     Lumbar ROM   Flexion -  To floor tight on R  Extension - min dec pain on R  Rotation - WNL   SB - WNL pain on L with R SB     HEP   - reach and roll   - PPT   - TA set knee fall outs     Treatment Today   3/5/2020  TREATMENT MINUTES COMMENTS   Evaluation     Self-care/ Home management     Manual therapy 20 SL lumbar rotational mobs grade II   AP mobs of the pelvis in SL  STM/MFR to the QL on the right side    Neuromuscular Re-education     Therapeutic Activity     Therapeutic Exercises 10  NuStep WL 5 x 7 min   Reviewed HEP as above     - PPT   - bridges 5\" hold  - TA set L2 band hip abduction unilaterally alternating sides    Gait training     Modality__________________                Total 30     Blank areas are intentional and mean the treatment did not include these items.     Emilia Gurrola, PT, DPT, CLT   3/5/2020  "

## 2021-06-07 NOTE — PROGRESS NOTES
"Enedelia De Jesus is a 34 y.o. female who is being evaluated via a billable telephone visit.      The patient has been notified of following:     \"This telephone visit will be conducted via a call between you and your physician/provider. We have found that certain health care needs can be provided without the need for a physical exam.  This service lets us provide the care you need with a short phone conversation.  If a prescription is necessary we can send it directly to your pharmacy.  If lab work is needed we can place an order for that and you can then stop by our lab to have the test done at a later time.    Telephone visits are billed at different rates depending on your insurance coverage. During this emergency period, for some insurers they may be billed the same as an in-person visit.  Please reach out to your insurance provider with any questions.    If during the course of the call the physician/provider feels a telephone visit is not appropriate, you will not be charged for this service.\"    Patient has given verbal consent to a Telephone visit? Yes    Patient would like to receive their AVS by AVS Preference: Cm.    Additional provider notes: Ms De Jesus seen in January and March 2020 with low back pain with left-sided sciatica, after being evaluated for chronic fatigue/pain by rheumatology on 12/5/2019.  MR lumbar spine from January noted mild lower lumbar spondylosis but no canal foraminal narrowing.  Counseled to use acetaminophen and ibuprofen, prescribed methocarbamol and referred to PT. With no improvement in her symptoms she was then referred to neurosurgery, but it was cancelled due to Coronavirus.   She states that the leg pain has been getting worse for the last 2 weeks. Has been working with PT, endorses an episode where she felt a loud popping noise in the right leg. States that her right leg feels weaker and though it is just about to snap, mery when on stairs. Pain radiates down from " the lumbar region down the right leg to the knee. Unable to put a lot of pressure on the right leg. Pain is 8/10. Always a dull ache, worse with standing or going up stair, it is very sharp. Feels like right hip/thigh is tender. No trauma/fall, swelling or rash. Rheum labs were WNL, including CRP and ESR. Not having muscle spasms and has GI/ control.  Ran out of the duloxetine and did not like it made her feel.   Is allergic to ibuprofen, and taking aspirin 650 mg every 4-6 hours and APAP 1000 mg PRN. APAP helps better than ASA.    Assessment/Plan:  1. Osteoarthritis of spine with radiculopathy, lumbar region  Complicated picture with a constellation of symptoms and history suggestive of worsening spondylosis with frequent radicular symptoms.  History and objective data from labs not suggestive of myositis or rheumatologic issue.  Patient too young and history unremarkable for nontraumatic bone fracture/injury from poor bone density.  Counseled patient at length on importance to check in with neurosurgery regularly for when she can be evaluated, with the interim plan to treat medically and monitor for worsening signs or symptoms of neurologic compromise. Counseled on taking maximum acetaminophen 3000 to 4000 mg a day, as needed aspirin with food and trial of gabapentin for the nerve pain.  She will let us know over the weekend how she tolerates it. If 5-7/10 ache persists, may benefit from low-dose tramadol.  Updated medication chart with removal of Valium, duloxetine and Robaxin.  - gabapentin (NEURONTIN) 600 MG tablet; To start, take 0.5 tab at bedtime. If tolerated, take another 0.5 tab in the AM.  Dispense: 30 tablet; Refill: 0    Phone call duration:  36 minutes

## 2021-06-08 NOTE — PROGRESS NOTES
[facility] Rehabilitation Daily Progress     Patient Name: Fred De Jesus  Date: 6/8/2020  Visit #: 3/25   Auth: send 2 weeks prior to 26th  Referral Diagnosis: low back pain  Referring provider: Jus Renee*  Visit Diagnosis:     ICD-10-CM    1. Acute bilateral low back pain without sciatica  M54.5    2. Muscle weakness (generalized)  M62.81        Assessment:     Today patient reporting she wasn't really sore after last visit, her upper back had been bothering her some. Patient tolerated addition of DE today and rotary torso with decreased of weight. Patient cannot make Thursday appts anymore so will have to change those to only doing 1x/week, meaning patient will have to be more compliant with HEP.     From Eval:  Patient is a 34 y.o. female that presents with signs and symptoms consistent with R>L low back pain secondary to poor core stability and myofascial restrictions. Patient demonstrates impairments including decreased lumbar joint mobility, hamstring flexibility and myofascial mobility, leading to impaired functional mobility. Patient's functional limitations include sitting or standing for longer periods of time, performing daily housework without difficulty or delayed onset of pain, and bending forwards to do the dishes/laundry. Today patient responded well to patient education, manual therapy and therapeutic exercise; patient was tested on lumbar MedX and demonstrates below average strength for end range extension, otherwise at or above average.       HEP/POC compliance is  good .  Patient demonstrates understanding/independence with home program.  Patient is appropriate to continue with skilled physical therapy intervention, as indicated by initial plan of care.    Goal Status:  Pt. will be independent with home exercise program in : 6 weeks    Pt will: be able to perform yard work for a total of 2 hours without increased LBP symptoms by 6 weeks.  Pt will: demonstrate improved end  range lumbar MedX strength by at least 30# by 6 weeks.  Pt will: be able to sleep comfortably on her back without waking up in the middle of the night by 6 weeks.      Plan / Patient Education:     Continue with initial plan of care.  Progress with home program as tolerated.    Plan for next visit: review HEP, lumbar DE and rotary torso, give strengthening HEP, seated pelvic postural awareness, sit to stands, HF stretching, lumbar strengthening     Subjective:     Pain Ratin  She is doing okay. Patient struggles with being careful with not pushing herself too far in the stretching. Often after doing her HEP she feels tingling in her low back, new symptoms.     Objective:        Lumbar MedX Initial testing 2020 4-week Re-test   AROM (full=  0-72  lumbar) 0-66     Max Extension Torque  255#     Average Extension Torque 199#     Flex: ext ratio (ideal 1.4:1) 3.75:1           Treatment Today         Manual Therapy:  Prone MFR to lumbar paraspinals with pillow under hips     Exercises:  Exercise #1: treadmill 3 min  Comment #1: SKTC, piriformis figure 4 and lumbar rotation stretch - hold 30 sec  Exercise #2: supine knee rocks - 20 reps  Comment #2: rotary torso - 90s, starting to R, 30#  Exercise #3: bridge with hip adduction - 10-30 reps  Comment #3: abdominal set with marching - 10 reps        TREATMENT MINUTES COMMENTS   Evaluation     Self-care/ Home management     Manual therapy 5 Prone MFR to lumbar paraspinals with pillow under hips    Neuromuscular Re-education     Therapeutic Activity     Therapeutic Exercises 25 See above flowsheet   Treadmill warm up  Lumbar DE and rotary torso    Gait training     Modality__________________                Total 30    Blank areas are intentional and mean the treatment did not include these items.       Almaz Tidwell, PT  2020

## 2021-06-08 NOTE — PROGRESS NOTES
NEUROSURGERY CONSULTATION NOTE    5/11/2020     Fred De Jesus is a 34 y.o. female who is sent to us in consultation by Nadya Villalpando for evaluation of lumbago.         CONSULTATION ASSESSMENT AND PLAN:    35 yo female who presents with low back and right leg pain.  MRI shows very mild disc bulge at L5-at sacral 1 with no neural impingement.  Does have some facet arthropathy at lumbar 4-lumbar 5.  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  Recommend a trial of conservative management at the spine center.  All questions answered.     I spent more than 45 minutes in this apt, examining the pt, reviewing the scans, reviewing notes from chart, discussing treatment options with risks and benefits and coordinating care. >50 % clinic time was spent in face to face counseling and coordinating care    Shama Butt     HPI: 35 yo female who presents with low back and right leg pain.Symptoms have been present since January.  In the last 2 weeks developed right lateral leg pain which went down to ankle but now just to her knee. No numbness or tingling in the legs or left leg symptoms. Does have numbness and tingling as well as coldness in her lower back. No leg weakness or bowel or bladder dysfunction.     PT has improved low back pain. Tylenol will improve her pain but wont help her sleep through the night. Activity will improve her pain. Has noted pain in her hands at times and with typing for work.     Prior Spine Surgery:no    Past Medical History:   Diagnosis Date     Depression      Fatigue      Hypothyroid      Migraine      PSHx- no previus surgeries    REVIEW OF SYSTEMS:  ROS reviewed with pt as documented on pt health form of 5/11/2020.     No family hx of anesthetic reactions.  No family hx of hypercoagulability.       MEDICATIONS:  Current Outpatient Medications   Medication Sig Dispense Refill     levonorgestrel (MIRENA) 20 mcg/24 hours (5 yrs)  52 mg IUD 1 each by Intrauterine route once.       levothyroxine (SYNTHROID, LEVOTHROID) 75 MCG tablet Take 1 tablet (75 mcg total) by mouth daily. 90 tablet 3     norethindrone (MICRONOR) 0.35 mg tablet Take 1 tablet (0.35 mg total) by mouth daily. 84 tablet 4     sertraline (ZOLOFT) 50 MG tablet Take 50 mg by mouth daily.        gabapentin (NEURONTIN) 600 MG tablet To start, take 0.5 tab at bedtime. If tolerated, take another 0.5 tab in the AM. 30 tablet 0     No current facility-administered medications for this visit.          ALLERGIES/SENSITIVITIES:     Allergies   Allergen Reactions     Motrin [Ibuprofen]        PERTINENT SOCIAL HISTORY:   Social History     Socioeconomic History     Marital status: Single     Spouse name: None     Number of children: None     Years of education: None     Highest education level: None   Occupational History     None   Social Needs     Financial resource strain: None     Food insecurity     Worry: None     Inability: None     Transportation needs     Medical: None     Non-medical: None   Tobacco Use     Smoking status: Never Smoker     Smokeless tobacco: Never Used   Substance and Sexual Activity     Alcohol use: Yes     Comment: rarely     Drug use: None     Sexual activity: None   Lifestyle     Physical activity     Days per week: None     Minutes per session: None     Stress: None   Relationships     Social connections     Talks on phone: None     Gets together: None     Attends Holiness service: None     Active member of club or organization: None     Attends meetings of clubs or organizations: None     Relationship status: None     Intimate partner violence     Fear of current or ex partner: None     Emotionally abused: None     Physically abused: None     Forced sexual activity: None   Other Topics Concern     None   Social History Narrative     None         FAMILY HISTORY:  Family History   Problem Relation Age of Onset     Cancer Brother          Tumor  in this thigh  "    Alcohol abuse Brother      Depression Brother      Drug abuse Brother      Early death Brother      Snoring Brother      Thyroid disease Maternal Grandmother      Arthritis Maternal Grandmother      Asthma Maternal Grandmother      Diabetes Maternal Grandmother      Arthritis Mother      Depression Mother      Alcohol abuse Father         PHYSICAL EXAM:   Constitutional: /73   Pulse 75   Ht 5' 5\" (1.651 m)   Wt 179 lb (81.2 kg)   SpO2 98%   BMI 29.79 kg/m       Mental Status: A & O in no acute distress.  Affect is appropriate.  Speech is fluent.  Recent and remote memory are intact.  Attention span and concentration are normal.     Cranial Nerves: CN1: grossly intact per patient recall. CN2: No funduscopic exam performed. CN3,4 & 6: Pupillary light response, lateral and vertical gaze normal.  No nystagmus.  Visual fields are full to confrontation. CN5: Intact to touch CN7: No facial weakness, smile, facial symmetry intact. CN8: Intact to spoken voice. CN9&10: Gag reflex, uvula midline, palate rises with phonation. CN11: Shoulder shrug 5/5 intact bilaterally. CN12: Tongue midline and moves freely from side to side.     Motor: No pronator drift of upper extremity. Normal bulk and tone all muscle groups of upper and lower extremities.      Sensory: Sensation intact bilaterally to light touch.      Coordination:  Heel/toe/  gait intact. intact tandem gait      Reflexes; supinator, biceps, triceps, knee/ ankle jerk intact. no   babinski/ clonus.    IMAGING: I personally reviewed all radiographic images      Cc:   Nadya Villalpando, CNP  [address] [address]  "

## 2021-06-08 NOTE — PROGRESS NOTES
Neurosurgery consultation was requested by: Nadya Villalpando CNP  Pain: Lumbar  Radicular Pain is present:  none right hip  Lhermitte sign: No  Motor complaints: weakness in right leg  Sensory complaints: numbness tingling in mid back  Gait and balance issues: no  Bowel or bladder issues: No  Duration of SX is: 4 mo  The symptoms are worse with: sitting, stairs  The symptoms are better with: denies  Injury: denies  Severity is: moderate-severe  Patient has tried the following conservative measures: PT  CONSUELO score is: 58  Ange Renner CMA

## 2021-06-08 NOTE — PROGRESS NOTES
[facility] Rehabilitation Daily Progress     Patient Name: Fred De Jesus  Date: 6/15/2020  Visit #: 4/25   Auth: send 2 weeks prior to 26th  Referral Diagnosis: low back pain  Referring provider: Jus Renee*  Visit Diagnosis:     ICD-10-CM    1. Acute bilateral low back pain without sciatica  M54.5    2. Muscle weakness (generalized)  M62.81        Assessment:     Today patient reporting she wasn't really sore after last visit, her upper back had been bothering her some. Patient tolerated addition of DE today and rotary torso with decreased of weight. Patient cannot make Thursday appts anymore so will have to change those to only doing 1x/week, meaning patient will have to be more compliant with HEP. Patient feeling violently ill last week from pain after going back to the office work.      From Eval:  Patient is a 34 y.o. female that presents with signs and symptoms consistent with R>L low back pain secondary to poor core stability and myofascial restrictions. Patient demonstrates impairments including decreased lumbar joint mobility, hamstring flexibility and myofascial mobility, leading to impaired functional mobility. Patient's functional limitations include sitting or standing for longer periods of time, performing daily housework without difficulty or delayed onset of pain, and bending forwards to do the dishes/laundry. Today patient responded well to patient education, manual therapy and therapeutic exercise; patient was tested on lumbar MedX and demonstrates below average strength for end range extension, otherwise at or above average.       HEP/POC compliance is  good .  Patient demonstrates understanding/independence with home program.  Patient is appropriate to continue with skilled physical therapy intervention, as indicated by initial plan of care.    Goal Status:  Pt. will be independent with home exercise program in : 6 weeks    Pt will: be able to perform yard work for a total  of 2 hours without increased LBP symptoms by 6 weeks.  Pt will: demonstrate improved end range lumbar MedX strength by at least 30# by 6 weeks.  Pt will: be able to sleep comfortably on her back without waking up in the middle of the night by 6 weeks.      Plan / Patient Education:     Continue with initial plan of care.  Progress with home program as tolerated.    Plan for next visit: review HEP, lumbar DE and rotary torso, give strengthening HEP, seated pelvic postural awareness, sit to stands, HF stretching, lumbar strengthening     Subjective:     Pain Ratin  Patient feeling more back pain, she has started going to the office more, which includes more walking and moving around. Didn't have much soreness after last visit, icing seems to help but not get rid of it. This is the first time in the office for her since she changed jobs. Patient was violently ill from the pain.     Objective:        Lumbar MedX Initial testing 2020 4-week Re-test   AROM (full=  0-72  lumbar) 0-66     Max Extension Torque  255#     Average Extension Torque 199#     Flex: ext ratio (ideal 1.4:1) 3.75:1           Treatment Today         Manual Therapy:  Prone MFR to lumbar paraspinals with pillow under hips     Exercises:  Exercise #1: treadmill 3 min  Comment #1: SKTC, piriformis figure 4 and lumbar rotation stretch - hold 30 sec  Exercise #2: supine knee rocks - 20 reps  Comment #2: rotary torso - 90s, starting to L, 32#  Exercise #3: bridge with hip adduction - 10-30 reps  Comment #3: abdominal set with marching - 10 reps    Enter Week / Visit #: W2 V2  Weight (lbs): 120#  Reps (#): 16  Time: 112s  ROM (degrees): 0-66  Pain: muscle soreness  Flex:Ext ratio: 3.75:1        TREATMENT MINUTES COMMENTS   Evaluation     Self-care/ Home management     Manual therapy 10 Prone MFR to lumbar paraspinals with pillow under hips    Neuromuscular Re-education     Therapeutic Activity     Therapeutic Exercises 15 See above flowsheet    Treadmill warm up  Lumbar DE and rotary torso    Gait training     Modality__________________                Total 25    Blank areas are intentional and mean the treatment did not include these items.       Almaz Tidwell, PT  6/15/2020

## 2021-06-08 NOTE — PROGRESS NOTES
[facility] Rehabilitation Daily Progress     Patient Name: Fred De Jesus  Date: 6/17/2020  Visit #: 5/25   Auth: send 2 weeks prior to 26th  Referral Diagnosis: low back pain  Referring provider: Jus Renee*  Visit Diagnosis:     ICD-10-CM    1. Acute bilateral low back pain without sciatica  M54.5    2. Muscle weakness (generalized)  M62.81        Assessment:     Today patient reporting she wasn't really sore after last visit, her upper back had been bothering her some. Patient tolerated addition of DE today and rotary torso with increase of weight. Patient cannot make Thursday appts anymore so will have to change those to only doing 1x/week, meaning patient will have to be more compliant with HEP. Patient questioning why her abdominals hurt, as well as why she feels so fatigued sometimes; PT explained use of mask and lack of oxygen for feeling of exhaustion, and anatomy of core for abdominal pain symptoms.     From Eval:  Patient is a 34 y.o. female that presents with signs and symptoms consistent with R>L low back pain secondary to poor core stability and myofascial restrictions. Patient demonstrates impairments including decreased lumbar joint mobility, hamstring flexibility and myofascial mobility, leading to impaired functional mobility. Patient's functional limitations include sitting or standing for longer periods of time, performing daily housework without difficulty or delayed onset of pain, and bending forwards to do the dishes/laundry. Today patient responded well to patient education, manual therapy and therapeutic exercise; patient was tested on lumbar MedX and demonstrates below average strength for end range extension, otherwise at or above average.       HEP/POC compliance is  good .  Patient demonstrates understanding/independence with home program.  Patient is appropriate to continue with skilled physical therapy intervention, as indicated by initial plan of care.    Goal  Status:  Pt. will be independent with home exercise program in : 6 weeks    Pt will: be able to perform yard work for a total of 2 hours without increased LBP symptoms by 6 weeks.  Pt will: demonstrate improved end range lumbar MedX strength by at least 30# by 6 weeks.  Pt will: be able to sleep comfortably on her back without waking up in the middle of the night by 6 weeks.      Plan / Patient Education:     Continue with initial plan of care.  Progress with home program as tolerated.    Plan for next visit: review HEP, lumbar DE and rotary torso, give strengthening HEP, seated pelvic postural awareness, sit to stands, lumbar strengthening     Subjective:     Pain Ratin  A little soreness after last visit, her pain is always there, she doesn't ever not notice it.     Patient feeling more back pain, she has started going to the office more, which includes more walking and moving around.     Objective:        Lumbar MedX Initial testing 2020 4-week Re-test   AROM (full=  0-72  lumbar) 0-66     Max Extension Torque  255#     Average Extension Torque 199#     Flex: ext ratio (ideal 1.4:1) 3.75:1           Treatment Today         Manual Therapy:  Prone MFR to lumbar paraspinals with pillow under hips     Exercises:  Exercise #1: treadmill 3 min  Comment #1: SKTC, piriformis figure 4 and lumbar rotation stretch - hold 30 sec  Exercise #2: supine knee rocks - 20 reps  Comment #2: rotary torso - 90s, starting to R, 34#  Exercise #3: bridge with hip adduction - 10-30 reps  Comment #3: abdominal set with marching - 10 reps  Exercise #4: supine or standing hip flexor stretching - hold 30 sec  Comment #4: prone press ups - hold 3 sec x 10    Enter Week / Visit #: W3 v1  Weight (lbs): 125#  Reps (#): 16  Time: 126s  ROM (degrees): 0-66  Pain: muscle soreness  Flex:Ext ratio: 3.75:1        TREATMENT MINUTES COMMENTS   Evaluation     Self-care/ Home management     Manual therapy  Prone MFR to lumbar paraspinals with  pillow under hips    Neuromuscular Re-education     Therapeutic Activity     Therapeutic Exercises 25 See above flowsheet   Treadmill warm up  Lumbar DE and rotary torso    Gait training     Modality__________________                Total 25    Blank areas are intentional and mean the treatment did not include these items.       Almaz Tidwell, PT  6/17/2020

## 2021-06-08 NOTE — PROGRESS NOTES
[facility] Rehabilitation Daily Progress     Patient Name: Fred De Jesus  Date: 6/4/2020  Visit #: 2/25   Auth: send 2 weeks prior to 26th  Referral Diagnosis: low back pain  Referring provider: Jus Renee*  Visit Diagnosis:     ICD-10-CM    1. Acute bilateral low back pain without sciatica  M54.5    2. Muscle weakness (generalized)  M62.81        Assessment:     Today patient reporting she was very sore after initial visit and testing. Patient tolerated addition of DE today and rotary torso, however was not able to meet goal reps so will decrease weight next visit. Patient cannot make Thursday appts anymore so will have to change those to only doing 1x/week, meaning patient will have to be more compliant with HEP.     From Eval:  Patient is a 34 y.o. female that presents with signs and symptoms consistent with R>L low back pain secondary to poor core stability and myofascial restrictions. Patient demonstrates impairments including decreased lumbar joint mobility, hamstring flexibility and myofascial mobility, leading to impaired functional mobility. Patient's functional limitations include sitting or standing for longer periods of time, performing daily housework without difficulty or delayed onset of pain, and bending forwards to do the dishes/laundry. Today patient responded well to patient education, manual therapy and therapeutic exercise; patient was tested on lumbar MedX and demonstrates below average strength for end range extension, otherwise at or above average.         HEP/POC compliance is  good .  Patient demonstrates understanding/independence with home program.  Patient is appropriate to continue with skilled physical therapy intervention, as indicated by initial plan of care.    Goal Status:  Pt. will be independent with home exercise program in : 6 weeks    Pt will: be able to perform yard work for a total of 2 hours without increased LBP symptoms by 6 weeks.  Pt will:  demonstrate improved end range lumbar MedX strength by at least 30# by 6 weeks.  Pt will: be able to sleep comfortably on her back without waking up in the middle of the night by 6 weeks.        Plan / Patient Education:     Continue with initial plan of care.  Progress with home program as tolerated.    Plan for next visit: review HEP, initiate lumbar DE and rotary torso, give strengthening HEP, seated pelvic postural awareness, sit to stands, HF stretching, lumbar strengthening     Subjective:     Pain Ratin  Patient reports the next day she really hurt, today she doesn't feel too bad, yesterday after doing her HEP she struggled all day with. Patient struggles with being careful with not pushing herself too far in the stretching. Often after doing her HEP she feels tingling in her low back, new symptoms.     Objective:        Lumbar MedX Initial testing 2020 4-week Re-test   AROM (full=  0-72  lumbar) 0-66     Max Extension Torque  255#     Average Extension Torque 199#     Flex: ext ratio (ideal 1.4:1) 3.75:1           Treatment Today         Manual Therapy:  Prone MFR to lumbar paraspinals with pillow under hips     Exercises:  Exercise #1: treadmill 3 min  Comment #1: SKTC, piriformis figure 4 and lumbar rotation stretch - hold 30 sec  Exercise #2: supine knee rocks - 20 reps  Comment #2: rotary torso - 90s, starting to L, 26#        TREATMENT MINUTES COMMENTS   Evaluation     Self-care/ Home management     Manual therapy 15 Prone MFR to lumbar paraspinals with pillow under hips    Neuromuscular Re-education     Therapeutic Activity     Therapeutic Exercises 15 See above flowsheet   Treadmill warm up  Lumbar DE and rotary torso    Gait training     Modality__________________                Total 30    Blank areas are intentional and mean the treatment did not include these items.       Almaz Tidwell, PT  2020

## 2021-06-08 NOTE — PROGRESS NOTES
Assessment:     Diagnoses and all orders for this visit:    Lumbosacral spondylosis without myelopathy    Pain of right lower extremity    Myofascial pain    LETICIA positive    Lymphocytopenia    Myalgia    Polyarthralgia    Somatic dysfunction of pelvic region    Somatic dysfunction of sacral region    Somatic dysfunction of spine, lumbar    Somatic dysfunction of spine, thoracic    Cervical (neck) region somatic dysfunction    Somatic dysfunction of head region    Somatic dysfunction of rib cage region       Fred De Jesus is a 34 y.o. y.o. female with past medical history significant for LETICIA positive, polyarthralgia, lymphocytopenia, depression, myalgia  who presents today for follow-up regarding chronic low back pain:    -Patient continues to have low back pain that is likely secondary to lumbar spondylosis without myelopathy.  She would like a treatment of osteopathic manipulation today.     Plan:     A shared decision making plan was used. The patient's values and choices were respected. Prior medical records from our visit on 5/22/2020 were reviewed today. The following represents what was discussed and decided upon by the provider and the patient.        -DIAGNOSTIC TESTS: Images were personally reviewed and interpreted.   --MRI of the lumbar spine dated 1/10/2020 is personally viewed images interpreted discussed with patient.  Does show mild facet arthropathy at L5-S1 and L4-5.  There is no foraminal or central canal stenosis.    -INTERVENTIONS: Osteopathic manipulation is performed today.  Please see the procedure note below.    -MEDICATIONS: No changes to medications.  -  Discussed side effects of medications and proper use. Patient verbalized understanding.    -PHYSICAL THERAPY: Patient's had 3 sessions of physical therapy.  She had to stop going to physical therapy secondary to COVID-19.  I have reordered therapy and this time would like her to do [facility] physical therapy.       -PATIENT EDUCATION: We  discussed pain management in a multimodal fashion including physical therapy, medication management, and osteopathic manipulation.    -FOLLOW UP: Patient will follow-up in 2 weeks for OMT.  Advised to contact clinic if symptoms worsen or change.    Subjective:     Fred De Jesus is a 34 y.o. female who presents today for follow-up regarding chronic low back, mid back and neck pain.  Patient reports that her pain is worse with prolonged sitting and standing as well as bending forward to pick something up.  Tylenol and gabapentin have been helpful for pain somewhat.  Her pain today is 7/10 at its worse is 10/10 as best as 5/10.  She notes that her pain is worse today secondary to stressful events near her family.  She denies any bowel or bladder changes, fevers, chills, unintentional weight loss.  She does note pain across her mid back and low back.  She has headache from time to time.  She denies any bowel or bladder changes, fevers, chills, unintentional weight loss.  Her pain today is achy in nature.  She notes that she is never had chiropractic or osteopathic manipulation.  She is currently taking Tylenol and gabapentin.  She is taking 300 mg of gabapentin in the morning and evening.  She finds this to be somewhat helpful.  She will be starting physical therapy on Monday and is excited to get started with this.    -Treatment to Date: MRI this lumbar spine dated 1/1 10/2020.  3 sessions of physical therapy.    Patient Active Problem List   Diagnosis     Depression, unspecified depression type     Routine history and physical examination of adult     Myalgia     LETICIA positive     Polyarthralgia     Lymphocytopenia       Current Outpatient Medications on File Prior to Encounter   Medication Sig Dispense Refill     gabapentin (NEURONTIN) 600 MG tablet To start, take 0.5 tab at bedtime. If tolerated, take another 0.5 tab in the AM. 30 tablet 0     levonorgestrel (MIRENA) 20 mcg/24 hours (5 yrs) 52 mg IUD 1 each by  Intrauterine route once.       levothyroxine (SYNTHROID, LEVOTHROID) 75 MCG tablet Take 1 tablet (75 mcg total) by mouth daily. 90 tablet 3     norethindrone (MICRONOR) 0.35 mg tablet Take 1 tablet (0.35 mg total) by mouth daily. 84 tablet 4     sertraline (ZOLOFT) 50 MG tablet Take 50 mg by mouth daily.        No current facility-administered medications on file prior to encounter.        Allergies   Allergen Reactions     Motrin [Ibuprofen]        Past Medical History:   Diagnosis Date     Depression      Fatigue      Hypothyroid      Migraine         Review of Systems  ROS:  Specifically negative for bowel/bladder dysfunction, balance changes, headache, dizziness, foot drop, fevers, chills, appetite changes, nausea/vomiting, unexplained weight loss. Otherwise 13 systems reviewed are negative. Please see the patient's intake questionnaire from today for details.    Reviewed Social, Family, Past Medical and Past Surgical history with patient, no significant changes noted since prior visit.     Objective:     /58   Pulse 62     PHYSICAL EXAMINATION:    --CONSTITUTIONAL: Well developed, well nourished, healthy appearing individual.  --PSYCHIATRIC: Appropriate mood and affect. No difficulty interacting due to temper, social withdrawal, or memory issues.  --SKIN: Lumbar region is dry and intact.   --RESPIRATORY: Normal rhythm and effort. No abnormal accessory muscle breathing patterns noted.   --MUSCULOSKELETAL:  Normal lumbar lordosis noted, no lateral shift.  --GROSS MOTOR: Easily arises from a seated position. Gait is non-antalgic  --LUMBAR SPINE:  Inspection reveals no evidence of deformity. Range of motion is not limited in lumbar flexion, extension, lateral rotation.  She has tenderness palpation along her low back and over the PSIS area. Straight leg raising in the seated position is negative to radicular pain.   --LOWER EXTREMITY MOTOR TESTING:  Plantar flexion left 5/5, right 5/5   Dorsiflexion left  5/5, right 5/5   Great toe MTP extension left 5/5, right 5/5  Knee flexion left 5/5, right 5/5  Knee extension left 5/5, right 5/5   Hip flexion left 5/5, right 5/5  Hip abduction left 5/5, right 5/5  Hip adduction left 5/5, right 5/5   --NEUROLOGIC: Bilateral patellar and achilles reflexes are physiologic and symmetric. Sensation to light touch is intact in the bilateral L4, L5, and S1 dermatomes.    RESULTS:   Imaging: Lumbar spine imaging was reviewed today. The images were shown to the patient and the findings were explained using a spine model.    OSTEOPATHIC STRUCTURAL EXAM:  Head: OA is restricted and rotating right side bending left  Cervical: C3-4 rotated right side bent right  Ribs: Elevated first left rib  Thoracic: T5-6 rotated right side bent left  Lumbar: L4-5 rotated right side bent left  Pelvis: Superior left pelvic shear  Sacrum: Rotated left    OMT was performed today.  The patient tolerated the procedure well.  Patient was instructed to drink plenty of water.  As discussed that the patient could have increased soreness after the treatment, soreness like after a workout.  Pre-pain score 7/10 post pain score 5/10.    OMT:  TREATMENTS IN PARENTHESES  Head: Muscle energy  Cervical: Muscle energy and myofascial release  Ribs: Muscle energy  Thoracic: Muscle energy  Lumbar: Articulatory  Pelvis: Articulatory and muscle energy  Sacrum: Muscle energy

## 2021-06-08 NOTE — PROGRESS NOTES
"Fred De Jesus is a 34 y.o. female who is being evaluated via a billable video visit.      The patient has been notified of following:     \"This video visit will be conducted via a call between you and your physician/provider. We have found that certain health care needs can be provided without the need for an in-person physical exam.  This service lets us provide the care you need with a video conversation.  If a prescription is necessary we can send it directly to your pharmacy.  If lab work is needed we can place an order for that and you can then stop by our lab to have the test done at a later time.    Video visits are billed at different rates depending on your insurance coverage. Please reach out to your insurance provider with any questions.    If during the course of the call the physician/provider feels a video visit is not appropriate, you will not be charged for this service.\"    Patient has given verbal consent to a Video visit? Yes    Patient would like to receive their AVS by AVS Preference: Cm.    Patient would like the video invitation sent by: Text to cell phone: [phone]    Will anyone else be joining your video visit? No        Video Start Time: 1:15 PM    Additional provider notes:   ASSESSMENT: Fred De Jesus is a 34 y.o. female who presents for consultation at the request of [facility] PCP Nadya Villalpando CNP, with a past medical history significant for LETICIA positive, polyarthralgia, lymphocytopenia, depression, myalgia who presents today for new patient evaluation of chronic low back pain:    -Patient's pain is likely secondary to lumbar spondylosis without myelopathy.  He appears to have full strength on video visit exam.  She denies any weakness.    Patient is neurologically intact on exam. No myelopathic or red flag symptoms.    CONSUELO Score: 58    WHO 5: 11     Diagnoses and all orders for this visit:    Lumbosacral spondylosis without myelopathy  -     Ambulatory referral to " Physical Therapy    Pain of right lower extremity  -     Ambulatory referral to Physical Therapy    Myofascial pain  -     Ambulatory referral to Physical Therapy    LETICIA positive    Polyarthralgia    Myalgia    Lymphocytopenia      PLAN:  Reviewed spine anatomy and disease process. Discussed diagnosis and treatment options with the patient today. A shared decision making model was used.  The patient's values and choices were respected. The following represents what was discussed and decided upon by the provider and the patient.      -DIAGNOSTIC TESTS:  Images were personally reviewed and interpreted and explained to patient today using spine model.   --MRI of the lumbar spine dated 1/10/2020 is personally viewed images interpreted discussed with patient.  Does show mild facet arthropathy at L5-S1 and L4-5.  There is no foraminal or central canal stenosis.    -PHYSICAL THERAPY: Patient's had 3 sessions of physical therapy.  She had to stop going to physical therapy secondary to COVID-19.  I have reordered therapy and this time would like her to do [facility] physical therapy.    Discussed the importance of core strengthening, ROM, stretching exercises with the patient and how each of these entities is important in decreasing pain.  Explained to the patient that the purpose of physical therapy is to teach the patient a home exercise program.  These exercises need to be performed every day in order to decrease pain and prevent future occurrences of pain.        -MEDICATIONS: Patient reports that she is sensitive to medications.  She is taking gabapentin 300 mg at bedtime.  She is tried this during the day and is too groggy and dizzy with this medication.  At this time she would not like to try any other medications we did discuss other muscle relaxers like tizanidine or methocarbamol or even going on a lower dose of gabapentin during the day and at this time she like to see how she does with physical therapy  alone.  -  Discussed multiple medication options today with patient. Discussed risks, side effects, and proper use of medications. Patient verbalized understanding.    -INTERVENTIONS: No interventions at this time.  We did discuss that osteopathic manipulation may be helpful.  We also discussed facet joint injections.  Like to see her next week to trial osteopathic manipulation and have her start her physical therapy.  Discussed risks and benefits of injections with patient today.    -PATIENT EDUCATION: We discussed pain management in a multimodal fashion including physical therapy, medication management, osteopathic manipulation, and possible future injections.    -FOLLOW-UP:   Patient will follow-up next week for possible OMT    Advised patient to call the [facility] if symptoms worsen or you have problems controlling bladder and bowel function.   ______________________________________________________________________    SUBJECTIVE:  HPI:  Fred De Jesus  Is a 34 y.o. female who presents today for new patient evaluation of low back pain.  Patient reports that she has had moderate to severe low back pain since January.  When she first started having the back pain she was having decreased ability to use her legs this was concerning to her and she had some falls.  She was seen by her primary care provider.  MRI was obtained.  She never had bowel or bladder incontinence or retention and is still doing fine this way.  She is regained the use of her legs.  Patient reports that the pain is on her low back and buttock area.  Pain is worse with sitting for prolonged periods of time as well as standing specially when she is doing the dishes.  Pain is improved with heating pad somewhat she notes that not many things have been working for her back pain.  She also has right posterior lateral leg pain to just above the knee that is intermittent and worse when she is sleeping at night or going upstairs.  She reports that she  is allergic to ibuprofen as she had cramping and stomachaches with this when she was younger.  She has tried Tylenol and aspirin with no relief.  She is also tried Flexeril which did not help.  She has had 3 sessions of physical therapy and found that some of the manual medicine procedures were helping, however as she had done the exercises consistently this was not helping with her pain.  She also has a history of an LETICIA positive result and has been seen by rheumatology.  She was trialed on Cymbalta which she did not tolerate well she is currently not taking this.  Hydroxychloroquine may be the next step for her based on the rheumatology note.    -Treatment to Date: MRI this lumbar spine dated 1/1 10/2020.  3 sessions of physical therapy.    -Medications:    Current Outpatient Medications on File Prior to Encounter   Medication Sig Dispense Refill     gabapentin (NEURONTIN) 600 MG tablet To start, take 0.5 tab at bedtime. If tolerated, take another 0.5 tab in the AM. 30 tablet 0     levothyroxine (SYNTHROID, LEVOTHROID) 75 MCG tablet Take 1 tablet (75 mcg total) by mouth daily. 90 tablet 3     sertraline (ZOLOFT) 50 MG tablet Take 50 mg by mouth daily.        levonorgestrel (MIRENA) 20 mcg/24 hours (5 yrs) 52 mg IUD 1 each by Intrauterine route once.       norethindrone (MICRONOR) 0.35 mg tablet Take 1 tablet (0.35 mg total) by mouth daily. 84 tablet 4     No current facility-administered medications on file prior to encounter.        Allergies   Allergen Reactions     Motrin [Ibuprofen]        Past Medical History:   Diagnosis Date     Depression      Fatigue      Hypothyroid      Migraine         Patient Active Problem List   Diagnosis     Depression, unspecified depression type     Routine history and physical examination of adult     Myalgia     LETICIA positive     Polyarthralgia     Lymphocytopenia       Past surgical history: None    Family History   Problem Relation Age of Onset     Cancer Brother           Tumor  in this thigh     Alcohol abuse Brother      Depression Brother      Drug abuse Brother      Early death Brother      Snoring Brother      Thyroid disease Maternal Grandmother      Arthritis Maternal Grandmother      Asthma Maternal Grandmother      Diabetes Maternal Grandmother      Arthritis Mother      Depression Mother      Alcohol abuse Father        Reviewed past medical, surgical, and family history with patient found on new patient intake packet located in [employer] tab.     SOCIAL HX: Patient denies smoking or using recreational drugs.  She drinks alcohol only a few times a year.  Patient is single.  She is a .    ROS: Specifically negative for bowel/bladder dysfunction, balance changes, headache, dizziness, foot drop, fevers, chills, appetite changes, nausea/vomiting, unexplained weight loss. Otherwise 13 systems reviewed are negative. Please see the patient's intake questionnaire from today for details.    OBJECTIVE:  There were no vitals taken for this visit.    PHYSICAL EXAMINATION:    --CONSTITUTIONAL:  Vital signs as above.  No acute distress.  The patient is well nourished and well groomed.  --PSYCHIATRIC:  Appropriate mood and affect. The patient is awake, alert, oriented to person, place, time and answering questions appropriately with clear speech.    --SKIN:  Skin over the face is clean, dry, intact without rashes.    --RESPIRATORY: Normal rhythm and effort. No abnormal accessory muscle breathing patterns noted.   --STANDING EXAMINATION:  Normal lumbar lordosis noted, no lateral shift.  --MUSCULOSKELETAL: Lumbar spine inspection reveals no evidence of deformity. Range of motion is mildly limited in lumbar flexion, extension, lateral rotation.  She points to her right posterior lateral thigh to where the pain is as well as low back area around the belt line.  --GROSS MOTOR: Gait is non-antalgic. Easily arises from a seated position. Toe walking and heel walking are normal  without significant difficulty.    --LOWER EXTREMITY MOTOR TESTING:  Present she has full strength throughout her bilateral lower extremities and is at least antigravity.  She is able to walk on her toes and heels.    RESULTS: Prior medical records from [facility] rheumatology and primary care provider were reviewed today.    Imaging: Lumbar spine Imaging was personally reviewed and interpreted today.     Video-Visit Details    Type of service:  Video Visit    Video End Time (time video stopped): 1:44 PM  Originating Location (pt. Location): Home    Distant Location (provider location):  [facility]     Platform used for Video Visit: Cordelia Renee DO

## 2021-06-08 NOTE — PROGRESS NOTES
Assessment:     Diagnoses and all orders for this visit:    Lumbosacral spondylosis without myelopathy    Pain of right lower extremity    Myofascial pain    LETICIA positive    Lymphocytopenia    Myalgia    Polyarthralgia    Somatic dysfunction of pelvic region    Somatic dysfunction of sacral region    Somatic dysfunction of spine, lumbar    Somatic dysfunction of spine, thoracic    Cervical (neck) region somatic dysfunction    Somatic dysfunction of head region    Somatic dysfunction of rib cage region       Fred De Jesus is a 34 y.o. y.o. female with past medical history significant for LETICIA positive, polyarthralgia, lymphocytopenia, depression, myalgia who presents today for follow-up regarding chronic low back pain:    -Patient reports that she had good relief for at least 2 days with osteopathic manipulation.  She continues to have mid and low back pain.  She would like another treatment of osteopathic manipulation today.     Plan:     A shared decision making plan was used. The patient's values and choices were respected. Prior medical records from our last visit on 5/29/2020 as well as physical therapy notes were reviewed today. The following represents what was discussed and decided upon by the provider and the patient.        -DIAGNOSTIC TESTS: Images were personally reviewed and interpreted.   --MRI of the lumbar spine dated 1/10/2020 is personally viewed images interpreted discussed with patient.  Does show mild facet arthropathy at L5-S1 and L4-5.  There is no foraminal or central canal stenosis.    -INTERVENTIONS: Osteopathic manipulation is performed today.  Please see the procedure note below.    -MEDICATIONS: No changes to medications.  -  Discussed side effects of medications and proper use. Patient verbalized understanding.    -PHYSICAL THERAPY: Patient's had a total of 6 sessions of physical therapy 3 of which are MedX physical therapy.  Discussed the importance of core strengthening, ROM,  stretching exercises with the patient and how each of these entities is important in decreasing pain.  Explained to the patient that the purpose of physical therapy is to teach the patient a home exercise program.  These exercises need to be performed every day in order to decrease pain and prevent future occurrences of pain.        -PATIENT EDUCATION: We discussed pain management in a multimodal fashion including physical therapy, medication management, possible future injections.  We also discussed osteopathic manipulation.    -FOLLOW UP: Patient will follow up on 7/1/2020 for OMT.  Advised to contact clinic if symptoms worsen or change.    Subjective:     Fred De Jesus is a 34 y.o. female who presents today for follow-up regarding low back and mid back pain.  Patient reports that she received at least 2 days of good relief with osteopathic manipulation from her first treatment and would like to have another 1.  She has had 3 additional sessions of physical therapy and finds it to be helpful, however she has on occasion overdone her exercises at home and had worsening of pain.  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  Secondary to work she will be going to therapy only once a week.  Her pain is across her low back and buttock area bilaterally and between her scapula on both sides.  The pain today is 8/10 at its worse is 10/10 is best is 5/10.  Pain is worse with sitting and bending.  She has not found anything to be long-lasting relief for her.  Again osteopathic manipulation was somewhat helpful.  She denies any bowel or bladder changes, fevers, chills, unintentional weight loss.    -Treatment to Date: MRI this lumbar spine dated 1/1 10/2020.  6 sessions of physical therapy, 3 of which are medics physical therapy.    Patient Active Problem List   Diagnosis     Depression, unspecified  "depression type     Routine history and physical examination of adult     Myalgia     LETICIA positive     Polyarthralgia     Lymphocytopenia       Current Outpatient Medications on File Prior to Encounter   Medication Sig Dispense Refill     gabapentin (NEURONTIN) 600 MG tablet To start, take 0.5 tab at bedtime. If tolerated, take another 0.5 tab in the AM. 30 tablet 0     levonorgestrel (MIRENA) 20 mcg/24 hours (5 yrs) 52 mg IUD 1 each by Intrauterine route once.       levothyroxine (SYNTHROID, LEVOTHROID) 75 MCG tablet Take 1 tablet (75 mcg total) by mouth daily. 90 tablet 3     norethindrone (MICRONOR) 0.35 mg tablet Take 1 tablet (0.35 mg total) by mouth daily. 84 tablet 4     sertraline (ZOLOFT) 50 MG tablet Take 1 tablet (50 mg total) by mouth daily. 90 tablet 0     No current facility-administered medications on file prior to encounter.        Allergies   Allergen Reactions     Motrin [Ibuprofen]        Past Medical History:   Diagnosis Date     Depression      Fatigue      Hypothyroid      Migraine         Review of Systems  ROS:  Specifically negative for bowel/bladder dysfunction, balance changes, headache, dizziness, foot drop, fevers, chills, appetite changes, nausea/vomiting, unexplained weight loss. Otherwise 13 systems reviewed are negative. Please see the patient's intake questionnaire from today for details.    Reviewed Social, Family, Past Medical and Past Surgical history with patient, no significant changes noted since prior visit.     Objective:     /66 (Patient Site: Right Arm, Patient Position: Sitting, Cuff Size: Adult Regular)   Pulse 66   Temp 98.3  F (36.8  C) (Oral)   Ht 5' 5\" (1.651 m)   Wt 179 lb (81.2 kg)   BMI 29.79 kg/m      PHYSICAL EXAMINATION:    --CONSTITUTIONAL: Well developed, well nourished, healthy appearing individual.  --PSYCHIATRIC: Appropriate mood and affect. No difficulty interacting due to temper, social withdrawal, or memory issues.  --SKIN: Lumbar region is " dry and intact.   --RESPIRATORY: Normal rhythm and effort. No abnormal accessory muscle breathing patterns noted.   --MUSCULOSKELETAL:  Normal lumbar lordosis noted, no lateral shift.  --GROSS MOTOR: Easily arises from a seated position. Gait is non-antalgic  --LUMBAR SPINE:  Inspection reveals no evidence of deformity. Range of motion is not limited in lumbar flexion, extension, lateral rotation.  She has tenderness palpation along her low lumbar paraspinal muscles and in between her shoulder blades.  --LOWER EXTREMITY MOTOR TESTING:  Plantar flexion left 5/5, right 5/5   Dorsiflexion left 5/5, right 5/5   Great toe MTP extension left 5/5, right 5/5  Knee flexion left 5/5, right 5/5  Knee extension left 5/5, right 5/5   Hip flexion left 5/5, right 5/5  Hip abduction left 5/5, right 5/5  Hip adduction left 5/5, right 5/5   --NEUROLOGIC:  Sensation to light touch is intact in the bilateral L4, L5, and S1 dermatomes.    RESULTS:   Imaging: Lumbar spine imaging was reviewed today. The images were shown to the patient and the findings were explained using a spine model.    OSTEOPATHIC STRUCTURAL EXAM:  Head: OA is rotated right side bent left  Cervical: C4-5 rotated right side bent right  Ribs: Elevated first left rib  Thoracic: T5-6 rotated right side bent left  Lumbar: L4-5 rotated right side bent left  Pelvic: Left superior pelvic shear.  Sacrum: Rotated left    OMT was performed today.  The patient tolerated the procedure well.  Patient was instructed to drink plenty of water.  As discussed that the patient could have increased soreness after the treatment, soreness like after a workout.  Pre-pain score 8/10 post pain score 6/10.    OMT:  TREATMENTS IN PARENTHESES  Head: Muscle energy  Cervical: Muscle energy and myofascial release  Ribs: Muscle energy  Thoracic: Muscle energy  Lumbar: Articulatory   Pelvis: Articulatory and muscle energy  Sacrum: Muscle energy

## 2021-06-08 NOTE — PROGRESS NOTES
[facility]   Lumbo-Pelvic Initial Evaluation    Patient Name: Fred De Jesus  Date of evaluation: 6/1/2020  Referral Diagnosis: Lumbosacral spondylosis without myelopathy  Referring provider: Jus Renee*  Visit Diagnosis:     ICD-10-CM    1. Acute bilateral low back pain without sciatica  M54.5    2. Muscle weakness (generalized)  M62.81        Assessment:        Patient is a 34 y.o. female that presents with signs and symptoms consistent with R>L low back pain secondary to poor core stability and myofascial restrictions. Patient demonstrates impairments including decreased lumbar joint mobility, hamstring flexibility and myofascial mobility, leading to impaired functional mobility. Patient's functional limitations include sitting or standing for longer periods of time, performing daily housework without difficulty or delayed onset of pain, and bending forwards to do the dishes/laundry. Today patient responded well to patient education, manual therapy and therapeutic exercise; patient was tested on lumbar MedX and demonstrates below average strength for end range extension, otherwise at or above average.    Patient educated, demonstrated understanding and is in agreement with nature of impairment, plan of care, patient role and HEP. Patient compliant with PT and prognosis is good. Patient would benefit from skilled PT to progress and improve above impairments.    The POC is dynamic and will be modified on an ongoing basis.  Patient will return to clinic if symptoms persist.  Barriers to achieving goals as noted in the assessment section may affect outcome.  Prognosis to achieve goals is  good   Pt. is appropriate for skilled PT intervention as outlined in the Plan of Care (POC).  Pt. is a good candidate for skilled PT services to improve pain levels and function.    Goals:  Pt. will be independent with home exercise program in : 6 weeks    Pt will: be able to perform yard work  for a total of 2 hours without increased LBP symptoms by 6 weeks.  Pt will: demonstrate improved end range lumbar MedX strength by at least 30# by 6 weeks.  Pt will: be able to sleep comfortably on her back without waking up in the middle of the night by 6 weeks.      Patient's expectations/goals are realistic.    Barriers to Learning or Achieving Goals:  No Barriers.       Plan / Patient Instructions:        Plan of Care:   Communication with: Referral Source  Patient Related Instruction: Nature of Condition;Treatment plan and rationale;Self Care instruction;Basis of treatment;Body mechanics;Posture;Next steps;Expected outcome  Times per Week: 1-2  Number of Weeks: 6-8  Number of Visits: 25 before Auth  Discharge Planning: independent HEP and/or plateau of progress  Therapeutic Exercise: ROM;Stretching;Strengthening  Neuromuscular Reeducation: posture;kinesio tape;TNE;core  Manual Therapy: soft tissue mobilization;myofascial release;joint mobilization;muscle energy  Modalities: TENS  Gait Training: as indicated      POC and pathology of condition were reviewed with patient.  Pt. is in agreement with the Plan of Care  A Home Exercise Program (HEP) was initiated today.  Pt. was instructed in exercises by PT and patient was given a handout with detailed instructions.    Plan for next visit: review HEP, initiate lumbar DE and rotary torso, give strengthening HEP, seated pelvic postural awareness, sit to stands, HF stretching, lumbar strengthening      Subjective:         History of Present Illness:    Fred is a 34 y.o. female who presents to therapy today with complaints of entire low back pain. Date of onset is December 2019 and onset was gradual. Symptoms are constant, intermittent and not improving. Patient reports MRI of her low back showed nothing, from there her back pain is consistent. She denies history of similar symptoms. She describes their previous level of function as not limited. Patient struggled with  menstrual cycle low back pain. Patient sits most of her day, she is an , but she is working from home. Patient sleeps on her back, or sides but that has been uncomfortable recently.     Pain Rating:more pain than normal, 7 she did yardwork yesterday  Pain description: aching, burning and shooting    Functional limitations are described as occurring with:   dishes, laundry  ascending and descending stairs or curbs  bending  lifting  performing routine daily activities  transitional movements getting in  bed and chair and getting out of  bed and car    Patient reports benefit from:  rest           Objective:      Note: Items left blank indicates the item was not performed or not indicated at the time of the evaluation.    Examination  1. Acute bilateral low back pain without sciatica     2. Muscle weakness (generalized)       Involved side: Right and Bilateral  Posture Observation:      General sitting posture is  normal.  General standing posture is normal.    Lumbar ROM:    Date: 6/1/2020     *Indicate scale AROM AROM AROM   Lumbar Flexion Fingers to toes     Lumbar Extension Slight limitation with inc pain at end range       Right Left Right Left Right Left   Lumbar Sidebending         Lumbar Rotation         Thoracic Flexion      Thoracic Extension      Thoracic Sidebending         Thoracic Rotation           Lower Extremity Strength:   WFL no pain    Lumbar Special Tests:     Lumbar Special Tests Right Left SI Tests Right  Left   Quadrant test   SI Compression     Straight leg raise - - SI Distraction     Crossover response   POSH Test     Slump - - Sacral Thrust     Sit-up test  FADIR - -   Trunk extensor endurance test  Resisted Abduction     Prone instability test  Other:     Pubic shotgun  Other:       Repeated Motion Testing:  Does not centralize  Does not peripheralize    Passive Mobility - Joint Integrity:  Hypomobile    Palpation: TTP at bilateral glutes, piriformis   Flexibility: decreased of  piriformis and hamstrings, QL      Treatment Today       Lumbar MedX Initial testing 6/1/2020 4-week Re-test   AROM (full=  0-72  lumbar) 0-66    Max Extension Torque  255#    Average Extension Torque 199#    Flex: ext ratio (ideal 1.4:1) 3.75:1          Patient Instruction:  EDU on sleep position and MedX machines    Manual Therapy:  MFR glutes, piriformis, distal QL    Exercises:  Exercise #1: treadmill 3 min  Comment #1: SKTC, piriformis figure 4 and lumbar rotation stretch - hold 30 sec  Exercise #2: supine knee rocks - 20 reps          TREATMENT MINUTES COMMENTS   Evaluation 25    Self-care/ Home management     Manual therapy     Neuromuscular Re-education     Therapeutic Activity     Therapeutic Exercises 25 See flowsheet   Gait training     Modality__________________                Total 50    Blank areas are intentional and mean the treatment did not include these items.     PT Evaluation Code: (Please list factors)  Patient History/Comorbidities:   Patient Active Problem List   Diagnosis     Depression, unspecified depression type     Routine history and physical examination of adult     Myalgia     LETICIA positive     Polyarthralgia     Lymphocytopenia       Examination: decreased mobility increased pain  Clinical Presentation: stable  Clinical Decision Making: low    Patient History/  Comorbidities Examination  (body structures and functions, activity limitations, and/or participation restrictions) Clinical Presentation Clinical Decision Making (Complexity)   No documented Comorbidities or personal factors 1-2 Elements Stable and/or uncomplicated Low   1-2 documented comorbidities or personal factor 3 Elements Evolving clinical presentation with changing characteristics Moderate   3-4 documented comorbidities or personal factors 4 or more Unstable and unpredictable High                Almaz Tidwell, PT  6/1/2020  11:43 AM

## 2021-06-08 NOTE — PATIENT INSTRUCTIONS - HE
Osteopathic manipulation was performed today.  You could feel a sense of soreness like a workout type soreness.  Please drink plenty of water.    Please continue with your physical therapy.    ~Please call Nurse Navigation line [phone] with any questions or concerns about your treatment plan, if symptoms worsen and you would like to be seen urgently, or if you have problems controlling bladder and bowel function.

## 2021-06-08 NOTE — PATIENT INSTRUCTIONS - HE
Osteopathic manipulation was performed today.  You could feel a sense of soreness like a workout type soreness.  Please drink plenty water today.    Please keep your physical therapy appointments.    ~Please call Nurse Navigation line [phone] with any questions or concerns about your treatment plan, if symptoms worsen and you would like to be seen urgently, or if you have problems controlling bladder and bowel function.

## 2021-06-08 NOTE — PATIENT INSTRUCTIONS - HE

## 2021-06-09 NOTE — PROGRESS NOTES
[facility] Daily Progress     Patient Name: Fred De Jesus  Date: 6/29/2020  Visit #: 6/25   Auth: send 2 weeks prior to 26th  Referral Diagnosis: low back pain  Referring provider: Nadya Villalpando,*  Visit Diagnosis:     ICD-10-CM    1. Acute bilateral low back pain without sciatica  M54.5    2. Muscle weakness (generalized)  M62.81        Assessment:     Today patient hasn't been seen in 2 weeks and reporting she was having some bad days, mostly because she had a colonoscopy and endoscopy, so she wasn't able to keep up with her physical activity as much as normal. Patient tolerated addition of DE and rotary torso with increase of weight. Patient not really seeing change in therapy and her pain, PT recommended follow up with referring provider to discuss need for further therapy.     Patient cannot make Thursday appts anymore so will have to change those to only doing 1x/week, meaning patient will have to be more compliant with HEP. Patient questioning why her abdominals hurt, as well as why she feels so fatigued sometimes; PT explained use of mask and lack of oxygen for feeling of exhaustion, and anatomy of core for abdominal pain symptoms.     From Eval:  Patient is a 34 y.o. female that presents with signs and symptoms consistent with R>L low back pain secondary to poor core stability and myofascial restrictions. Patient demonstrates impairments including decreased lumbar joint mobility, hamstring flexibility and myofascial mobility, leading to impaired functional mobility. Patient's functional limitations include sitting or standing for longer periods of time, performing daily housework without difficulty or delayed onset of pain, and bending forwards to do the dishes/laundry. Today patient responded well to patient education, manual therapy and therapeutic exercise; patient was tested on lumbar MedX and demonstrates below average strength for end range extension, otherwise at or  above average.       HEP/POC compliance is  good .  Patient demonstrates understanding/independence with home program.  Patient is appropriate to continue with skilled physical therapy intervention, as indicated by initial plan of care.    Goal Status:  Pt. will be independent with home exercise program in : 6 weeks    Pt will: be able to perform yard work for a total of 2 hours without increased LBP symptoms by 6 weeks.  Pt will: demonstrate improved end range lumbar MedX strength by at least 30# by 6 weeks.  Pt will: be able to sleep comfortably on her back without waking up in the middle of the night by 6 weeks.      Plan / Patient Education:     Continue with initial plan of care.  Progress with home program as tolerated.    Plan for next visit: review HEP, lumbar DE and rotary torso, give strengthening HEP, seated pelvic postural awareness, sit to stands, lumbar strengthening, retest next visit - need to schedule more    Subjective:     Pain Ratin  Patient feeling like she wasn't able to do much over the last 2 weeks, she was able to do her stretching but physical activity was decreased, her pain was increased throughout. She really let it go, she didn't push it.     Patient feeling more back pain, she has started going to the office more, which includes more walking and moving around.     Objective:        Lumbar MedX Initial testing 2020 4-week Re-test   AROM (full=  0-72  lumbar) 0-66     Max Extension Torque  255#     Average Extension Torque 199#     Flex: ext ratio (ideal 1.4:1) 3.75:1           Treatment Today         Manual Therapy:  Prone MFR to lumbar paraspinals with pillow under hips     Exercises:  Exercise #1: treadmill 3 min  Comment #1: SKTC, piriformis figure 4 and lumbar rotation stretch - hold 30 sec  Exercise #2: supine knee rocks - 20 reps  Comment #2: rotary torso - 90s, starting to R, 34#  Exercise #3: bridge with hip adduction - 10-30 reps  Comment #3: abdominal set with marching  - 10 reps  Exercise #4: supine or standing hip flexor stretching - hold 30 sec  Comment #4: prone press ups - hold 3 sec x 10    Enter Week / Visit #: wk 3 v2  Weight (lbs): 128#  Reps (#): 16  Time: 126s  ROM (degrees): 0-66  Pain: muscle soreness  Flex:Ext ratio: 3.75:1        TREATMENT MINUTES COMMENTS   Evaluation     Self-care/ Home management     Manual therapy  Prone MFR to lumbar paraspinals with pillow under hips    Neuromuscular Re-education     Therapeutic Activity     Therapeutic Exercises 25 See above flowsheet   Treadmill warm up  Lumbar DE and rotary torso    Gait training     Modality__________________                Total 25    Blank areas are intentional and mean the treatment did not include these items.       Almaz Tidwell, PT  6/29/2020

## 2021-06-09 NOTE — PROGRESS NOTES
Internal Medicine Office Visit  [facility]  Patient Name: Fred De Jesus  Patient Age: 34 y.o.  YOB: 1985  MRN: [id number]    Date of Visit: 2020  Reason for Office Visit:   Chief Complaint   Patient presents with     Follow-up     scratch under the left breast. Looks infected. Also needing refills.            Assessment / Plan / Medical Decision Makin. Osteoarthritis of spine with radiculopathy, lumbar region  Symptoms are currently well controlled we will transition to 300 mg tablets that she does not need to cardia 600 mg tablet in half  - gabapentin (NEURONTIN) 300 MG capsule; Take 1 capsule (300 mg total) by mouth at bedtime.  Dispense: 90 capsule; Refill: 0    2. Depression, unspecified depression type  Stable recommend attending Stefan 50 mg daily  - sertraline (ZOLOFT) 50 MG tablet; Take 1 tablet (50 mg total) by mouth daily.  Dispense: 90 tablet; Refill: 0    3. Local infection of skin and subcutaneous tissue  - cephalexin (KEFLEX) 500 MG capsule; Take 1 capsule (500 mg total) by mouth 3 (three) times a day for 10 days.  Dispense: 30 capsule; Refill: 0    4. Wheeze  - albuterol (PROAIR HFA;PROVENTIL HFA;VENTOLIN HFA) 90 mcg/actuation inhaler; Inhale 2 puffs every 6 (six) hours as needed for shortness of breath.  Dispense: 1 each; Refill: 0 advised patient to utilize prior to physical activity will follow-up in 2 weeks, sooner if needed.  Discussion was had with patient about possible referral to pulmonology and need for pulmonary function testing.    Patient advised if symptoms persist, worsen or new symptoms arise they are to seek medical care.  All patients questions addressed. Patient verbalized understanding and agreement with plan.     No orders of the defined types were placed in this encounter.  Followup: Return in about 2 weeks (around 7/10/2020). earlier if needed.    Health Maintenance Review  Health Maintenance   Topic Date Due      DEPRESSION ACTION PLAN  1985     HIV SCREENING  11/19/1999     ADVANCE CARE PLANNING  11/19/2002     PAP SMEAR  06/16/2019     PREVENTIVE CARE VISIT  10/18/2020     TD 18+ HE  10/18/2029     INFLUENZA VACCINE RULE BASED  Completed     TDAP ADULT ONE TIME DOSE  Completed         I have discontinued Fred De Jesus's gabapentin. I am also having her start on gabapentin, cephalexin, and albuterol. Additionally, I am having her maintain her levonorgestreL, norethindrone, levothyroxine, and sertraline.      HPI:  Fred De Jesus is a 34 y.o. year old who presents to the office today with chronic conditions including hypothyroidism, back pain, positive LETICIA, depression, migraines, fibromyalgia.    Patient presents clinic today for chief concern for infection scratch on the right breast.  She reports that she did have some drainage is noted coney specific injury although could have scratched areas she noted mild erythema and discomfort.    Patient has diagnosis of depression she feels the Zoloft is working well at 50 mg daily she does not wish to make any changes at this time.    Patient reports a history of some cough with wheeze notes this was many years ago she reports with the increase in humidity her symptoms have returned and notes that at times she will cough with laughing.  She reports some shortness of breath with wheezing with activity and states she does not feel as though she gets good deep breathe and is concern for asthma.    Patient has a diagnosis of osteoarthritis of the spine with radiculopathy and she has been taking half of the 600 mg tablet at bedtime to help with rest as well as discomfort she notes that 300 mg is working well and she would like to continue on this medication.    Patient notes no additional concerns.      Review of Systems- pertinent positive in bold:  Constitutional: Fever, chills, night sweats, fainting, weight change, fatigue, dizziness, sleeping difficulties, loud  snoring/pauses in breathing  Eyes: change in vision, blurred or double vision, redness/eye pain  Ears, nose, mouth, throat: change in hearing, ear pain, hoarseness, difficulty swallowing, sores in the mouth or throat  Respiratory: shortness of breath, cough, bloody sputum, wheezing  Cardiovascular: chest pain, palpitations   Gastrointestinal: abdominal pain, heartburn/indigestion, nausea/vomiting, change in appetite, change in bowel habits, constipation or diarrhea, rectal bleeding/dark stools, difficulty swallowing  Urinary: painful urination, frequent urination, urinary urgency/incontinence, blood in urine/dark urine, nocturia (new or increasing), muscle cramps, swelling of hands, feet, ankles, leg pain/redness  Skin:See HPI   Hematologic/lymphatic: swollen lymph glands, abnormal bruising/bleeding  Endocrine: excessive thirst/urination, cold or heat intolerance  Neurologic/emotional: worrisome memory change, numbness/tingling, anxiety, mood swings      Current Scheduled Meds:  Outpatient Encounter Medications as of 6/26/2020   Medication Sig Dispense Refill     levonorgestrel (MIRENA) 20 mcg/24 hours (5 yrs) 52 mg IUD 1 each by Intrauterine route once.       levothyroxine (SYNTHROID, LEVOTHROID) 75 MCG tablet Take 1 tablet (75 mcg total) by mouth daily. 90 tablet 3     norethindrone (MICRONOR) 0.35 mg tablet Take 1 tablet (0.35 mg total) by mouth daily. 84 tablet 4     sertraline (ZOLOFT) 50 MG tablet Take 1 tablet (50 mg total) by mouth daily. 90 tablet 0     [DISCONTINUED] gabapentin (NEURONTIN) 600 MG tablet To start, take 0.5 tab at bedtime. If tolerated, take another 0.5 tab in the AM. 30 tablet 0     [DISCONTINUED] sertraline (ZOLOFT) 50 MG tablet Take 1 tablet (50 mg total) by mouth daily. 90 tablet 0     albuterol (PROAIR HFA;PROVENTIL HFA;VENTOLIN HFA) 90 mcg/actuation inhaler Inhale 2 puffs every 6 (six) hours as needed for shortness of breath. 1 each 0     cephalexin (KEFLEX) 500 MG capsule Take 1  "capsule (500 mg total) by mouth 3 (three) times a day for 10 days. 30 capsule 0     gabapentin (NEURONTIN) 300 MG capsule Take 1 capsule (300 mg total) by mouth at bedtime. 90 capsule 0     No facility-administered encounter medications on file as of 6/26/2020.      Past Medical History:   Diagnosis Date     Depression      Fatigue      Hypothyroid      Migraine      No past surgical history on file.  Social History     Tobacco Use     Smoking status: Never Smoker     Smokeless tobacco: Never Used   Substance Use Topics     Alcohol use: Yes     Comment: rarely     Drug use: Not on file     Family History   Problem Relation Age of Onset     Cancer Brother          Tumor  in this thigh     Alcohol abuse Brother      Depression Brother      Drug abuse Brother      Early death Brother      Snoring Brother      Thyroid disease Maternal Grandmother      Arthritis Maternal Grandmother      Asthma Maternal Grandmother      Diabetes Maternal Grandmother      Arthritis Mother      Depression Mother      Alcohol abuse Father      Social History     Social History Narrative     Not on file       Objective / Physical Examination:  Vitals:    06/26/20 0838   BP: 110/62   Pulse: 85   Resp: 20   Temp: 97.7  F (36.5  C)   SpO2: 96%   Weight: 173 lb (78.5 kg)   Height: 5' 5\" (1.651 m)     Wt Readings from Last 3 Encounters:   06/26/20 173 lb (78.5 kg)   06/12/20 179 lb (81.2 kg)   05/11/20 179 lb (81.2 kg)     Body mass index is 28.79 kg/m .     General Appearance: Alert and oriented, cooperative, affect appropriate, speech clear, in no apparent distress  Head: Normocephalic, atraumatic  Eyes:Conjunctivae clear and sclerae non-icteric  Neck: Supple, trachea midline. No cervical adenopathy  Lungs: Clear to auscultation bilaterally. No adventitious lung sounds noted. Normal inspiratory and expiratory effort  Cardiovascular: Regular rate, normal S1, S2. No murmurs, rubs, or gallops  Extremities: Pulses 2+ and equal throughout. No edema. "   Integumentary: Warm and dry.  Patient has a scratch at the 5:00 aric left breast no nipple discharge dimpling or mass noted no drainage it is warm to the touch and erythema is noted  Neuro: Alert and oriented, follows commands appropriately.     PHQ 6/26/2020   PHQ-9 Total Score 11   Q9: Thoughts of better off dead/self-harm past 2 weeks Not at all       Feeling nervous, anxious or on edge: 1 (6/26/2020  8:00 AM)  Not being able to stop or control worry: 0 (6/26/2020  8:00 AM)  Worrying too much about different things: 2 (6/26/2020  8:00 AM)  Trouble relaxing: 3 (6/26/2020  8:00 AM)  Being so restless that is is hard to sit still: 3 (6/26/2020  8:00 AM)  Becoming easily annnoyed or irritable: 1 (6/26/2020  8:00 AM)  Feeling afraid as if something awful might happen: 0 (6/26/2020  8:00 AM)  INA-7 Total: 10 (6/26/2020  8:00 AM)  How difficult did these problems make it for you to do your work, take care of things at home or get along with other people? : Somewhat difficult (6/26/2020  8:00 AM)  How difficult did these problems make it for you to do your work, take care of things at home or get along with other people? : Somewhat difficult (6/26/2020  8:00 AM)          Nadya Villalpando, CNP

## 2021-06-09 NOTE — PROGRESS NOTES
[facility] Discharge Summary  Patient Name: Fred De Jesus  Date: 9/9/2020  Referral Diagnosis: low back pain  Referring provider: Nadya Villalpando,*  Visit Diagnosis:   1. Acute bilateral low back pain without sciatica     2. Muscle weakness (generalized)         Goals:  Pt. will be independent with home exercise program in : 6 weeks    Pt will: be able to perform yard work for a total of 2 hours without increased LBP symptoms by 6 weeks.  Pt will: demonstrate improved end range lumbar MedX strength by at least 30# by 6 weeks.  Pt will: be able to sleep comfortably on her back without waking up in the middle of the night by 6 weeks.    Patient was seen for 7 visits for physical therapy of low back pain from 6/1/2020 to 7/6/2020 with no follow up appointments.   The patient was instructed to follow up with physician's clinic.  The patient discontinued therapy, did not return.  No further therapy is required at this time.    Therapy will be discontinued at this time.  The patient will need a new referral to resume physical therapy treatment. Please see below for patient's current status.    Thank you for your referral.  Almaz Tidwell, PT, DPT  9/9/2020   11:22 AM      [facility] Daily Progress     Patient Name: Fred De Jesus  Date: 7/6/2020  Visit #: 7/25   Auth: send 2 weeks prior to 26th  Referral Diagnosis: low back pain  Referring provider: Nadya Villalpando,*  Visit Diagnosis:     ICD-10-CM    1. Acute bilateral low back pain without sciatica  M54.5    2. Muscle weakness (generalized)  M62.81        Assessment:     Today retested and demonstrates almost same strength as initial visit, with slight improvement at end range extension. Testing above average at all points except end range. Patient plateau with progress, returning to provider for further instruction.    Patient cannot make Thursday appts anymore so will have to change those to only doing  1x/week, meaning patient will have to be more compliant with HEP. Patient questioning why her abdominals hurt, as well as why she feels so fatigued sometimes; PT explained use of mask and lack of oxygen for feeling of exhaustion, and anatomy of core for abdominal pain symptoms.     From Eval:  Patient is a 34 y.o. female that presents with signs and symptoms consistent with R>L low back pain secondary to poor core stability and myofascial restrictions. Patient demonstrates impairments including decreased lumbar joint mobility, hamstring flexibility and myofascial mobility, leading to impaired functional mobility. Patient's functional limitations include sitting or standing for longer periods of time, performing daily housework without difficulty or delayed onset of pain, and bending forwards to do the dishes/laundry. Today patient responded well to patient education, manual therapy and therapeutic exercise; patient was tested on lumbar MedX and demonstrates below average strength for end range extension, otherwise at or above average.       HEP/POC compliance is  good .  Patient demonstrates understanding/independence with home program.  Patient is appropriate to continue with skilled physical therapy intervention, as indicated by initial plan of care.    Goal Status:  Pt. will be independent with home exercise program in : 6 weeks    Pt will: be able to perform yard work for a total of 2 hours without increased LBP symptoms by 6 weeks.  Pt will: demonstrate improved end range lumbar MedX strength by at least 30# by 6 weeks.  Pt will: be able to sleep comfortably on her back without waking up in the middle of the night by 6 weeks.      Plan / Patient Education:     Continue with initial plan of care.  Progress with home program as tolerated.    Plan for next visit: review HEP, lumbar DE and rotary torso, give strengthening HEP, seated pelvic postural awareness, sit to stands, lumbar strengthening, retest next visit  - need to schedule more    Subjective:     Pain Ratin  Patient feeling about the same.    Patient feeling more back pain, she has started going to the office more, which includes more walking and moving around.     Objective:        Lumbar MedX Initial testing 2020 4-week Re-test 2020   AROM (full=  0-72  lumbar) 0-66  0-72   Max Extension Torque  255#  266#   Average Extension Torque 199#     Flex: ext ratio (ideal 1.4:1) 3.75:1  2.53:1         Treatment Today         Manual Therapy:  Prone MFR to lumbar paraspinals with pillow under hips     Exercises:  Exercise #1: treadmill 3 min  Comment #1: SKTC, piriformis figure 4 and lumbar rotation stretch - hold 30 sec  Exercise #2: supine knee rocks - 20 reps  Comment #2: rotary torso - 90s, starting to L, 36#  Exercise #3: bridge with hip adduction - 10-30 reps  Comment #3: abdominal set with marching - 10 reps  Exercise #4: supine or standing hip flexor stretching - hold 30 sec  Comment #4: prone press ups - hold 3 sec x 10  Exercise #5: pilates reformer leg press - 20 reps, SL x 10 [place]  Comment #5: pilates bridge [place] x 10 with no press away, x10 with press away  Exercise #6: pilates lat pull down - 1R1G x 10    Enter Week / Visit #: wk 3 v2  Weight (lbs): 128#  Reps (#): 16  Time: 126s  ROM (degrees): 0-66  Pain: muscle soreness  Flex:Ext ratio: 3.75:1        TREATMENT MINUTES COMMENTS   Evaluation     Self-care/ Home management     Manual therapy  Prone MFR to lumbar paraspinals with pillow under hips    Neuromuscular Re-education     Therapeutic Activity     Therapeutic Exercises 10 See above flowsheet   Treadmill warm up  Lumbar DE and rotary torso    Gait training     Modality__________________     Physical performance testing 14          Total 24 Patient arrived 6 min late   Blank areas are intentional and mean the treatment did not include these items.       Almaz Tidwell, PT  2020

## 2021-06-09 NOTE — PROGRESS NOTES
Assessment:     Diagnoses and all orders for this visit:    Lumbosacral spondylosis without myelopathy  -     OPS Paravertbral Facet Joint Inj Lumbar; Future; Expected date: 07/29/2020    Myofascial pain  -     OPS Paravertbral Facet Joint Inj Lumbar; Future; Expected date: 07/29/2020    LETICIA positive    Polyarthralgia    Somatic dysfunction of pelvic region    Somatic dysfunction of sacral region    Somatic dysfunction of spine, lumbar    Somatic dysfunction of spine, thoracic    Cervical (neck) region somatic dysfunction    Somatic dysfunction of head region    Somatic dysfunction of rib cage region       Fred De Jesus is a 34 y.o. y.o. female with past medical history significant for LETICIA positive, polyarthralgia, lymphocytopenia, depression, myalgia  who presents today for follow-up regarding chronic low back pain:    -Patient continues to have low back pain despite conservative management.  She would like a treatment of osteopathic manipulation as it has been helpful.  She continues to have full strength on physical exam.  Facet loading is positive.  She does have arthritis in her facet joints.  This is likely cause of her pain.     Plan:     A shared decision making plan was used. The patient's values and choices were respected. Prior medical records from our last visit on 6/12/2020 as well as physical therapy notes were reviewed today. The following represents what was discussed and decided upon by the provider and the patient.        -DIAGNOSTIC TESTS: Images were personally reviewed and interpreted.   --MRI of the lumbar spine dated 1/10/2020 is personally viewed images interpreted discussed with patient.  Does show mild facet arthropathy at L5-S1 and L4-5.  There is no foraminal or central canal stenosis.    -INTERVENTIONS: I have ordered bilateral L4-5 and L5-S1 facet joint injections.  Osteopathic manipulation is performed today.  Please see the procedure note below.    -MEDICATIONS: No changes to  medications.  -  Discussed side effects of medications and proper use. Patient verbalized understanding.    -PHYSICAL THERAPY: The patient has had a total of 10 sessions of physical therapy 3 being traditional and 7 MedX physical therapy sessions.  I recommended she continue with this.  Discussed the importance of core strengthening, ROM, stretching exercises with the patient and how each of these entities is important in decreasing pain.  Explained to the patient that the purpose of physical therapy is to teach the patient a home exercise program.  These exercises need to be performed every day in order to decrease pain and prevent future occurrences of pain.        -PATIENT EDUCATION: We discussed pain management in a multimodal fashion including physical therapy, medication management, possible future injections.    -FOLLOW UP: Patient will follow-up 2 weeks after the injections.  Advised to contact clinic if symptoms worsen or change.    Subjective:     Fred De Jesus is a 34 y.o. female who presents today for follow-up regarding chronic low back pain.  Patient reports that she continues to have low back pain especially when she is sitting and standing for prolonged periods of time.  She is not a sterilely found anything that helps with her pain, however physical therapy stretches can give momentary help as does osteopathic manipulation.  Her pain today is 5/10 is worse 10/10 is best 5/10.  Pain is across the belt line of her low back.  She does have some numbness and tingling in her mid back and between her scapula.  She also notes headache.  Her pain in her low back is achy in nature.  She denies any bowel or bladder changes, fevers, chills, unintentional weight loss.  She denies any radiation of pain down her legs or her arms.  She does have some anterior lower abdominal pain from time to time, however predominant pain is her low back pain.  She notes that osteopathic manipulation does help and make her  feel more stretchy.  She would like a treatment today.    -Treatment to Date: MRI this lumbar spine dated 1/1 10/2020.  10 sessions of physical therapy,  of which are MedX physical therapy.  3 sessions of osteopathic manipulation.    Patient Active Problem List   Diagnosis     Depression, unspecified depression type     Routine history and physical examination of adult     Myalgia     LETICIA positive     Polyarthralgia     Lymphocytopenia       Current Outpatient Medications on File Prior to Encounter   Medication Sig Dispense Refill     albuterol (PROAIR HFA;PROVENTIL HFA;VENTOLIN HFA) 90 mcg/actuation inhaler Inhale 2 puffs every 6 (six) hours as needed for shortness of breath. 1 each 0     gabapentin (NEURONTIN) 300 MG capsule Take 1 capsule (300 mg total) by mouth at bedtime. 90 capsule 0     levonorgestrel (MIRENA) 20 mcg/24 hours (5 yrs) 52 mg IUD 1 each by Intrauterine route once.       levothyroxine (SYNTHROID, LEVOTHROID) 75 MCG tablet Take 1 tablet (75 mcg total) by mouth daily. 90 tablet 3     norethindrone (MICRONOR) 0.35 mg tablet Take 1 tablet (0.35 mg total) by mouth daily. 84 tablet 4     sertraline (ZOLOFT) 50 MG tablet Take 1 tablet (50 mg total) by mouth daily. 90 tablet 0     No current facility-administered medications on file prior to encounter.        Allergies   Allergen Reactions     Motrin [Ibuprofen]        Past Medical History:   Diagnosis Date     Depression      Fatigue      Hypothyroid      Migraine         Review of Systems  ROS:  Specifically negative for bowel/bladder dysfunction, balance changes, dizziness, foot drop, fevers, chills, appetite changes, nausea/vomiting, unexplained weight loss. Otherwise 13 systems reviewed are negative. Please see the patient's intake questionnaire from today for details.    Reviewed Social, Family, Past Medical and Past Surgical history with patient, no significant changes noted since prior visit.     Objective:     /60 (Patient Site: Right Arm,  "Patient Position: Sitting, Cuff Size: Adult Regular)   Pulse 60   Temp 98.4  F (36.9  C) (Oral)   Ht 5' 5\" (1.651 m)   Wt 173 lb (78.5 kg)   BMI 28.79 kg/m      PHYSICAL EXAMINATION:    --CONSTITUTIONAL: Well developed, well nourished, healthy appearing individual.  --PSYCHIATRIC: Appropriate mood and affect. No difficulty interacting due to temper, social withdrawal, or memory issues.  --SKIN: Lumbar region is dry and intact.   --RESPIRATORY: Normal rhythm and effort. No abnormal accessory muscle breathing patterns noted.   --MUSCULOSKELETAL:  Normal lumbar lordosis noted, no lateral shift.  --GROSS MOTOR: Easily arises from a seated position. Gait is non-antalgic  --LUMBAR SPINE:  Inspection reveals no evidence of deformity. Range of motion is mildly limited in lumbar flexion, extension, lateral rotation.  Facet loading is positive bilaterally.  She has tenderness palpation along her low lumbar paraspinal muscles bilaterally. Straight leg raising in the seated position is negative to radicular pain.   --LOWER EXTREMITY MOTOR TESTING:  Plantar flexion left 5/5, right 5/5   Dorsiflexion left 5/5, right 5/5   Great toe MTP extension left 5/5, right 5/5  Knee flexion left 5/5, right 5/5  Knee extension left 5/5, right 5/5   Hip flexion left 5/5, right 5/5  Hip abduction left 5/5, right 5/5  Hip adduction left 5/5, right 5/5   --HIPS: Full range of motion bilaterally.  --NEUROLOGIC:  Sensation to light touch is intact in the bilateral L4, L5, and S1 dermatomes.    RESULTS:   Imaging: Lumbar spine imaging was reviewed today. The images were shown to the patient and the findings were explained using a spine model.    OSTEOPATHIC STRUCTURAL EXAM:  Head: OA is rotated left side bent right  Cervical: C5-6 rotated right side bent right  Ribs: Elevated first right rib  Thoracic: T 4 5 rotated right side bent left  Lumbar: L4-5 rotated right side bent left  Pelvis: Superior left pelvic shear  Sacrum: Rotated left    OMT " was performed today.  The patient tolerated the procedure well.  Patient was instructed to drink plenty of water.  As discussed that the patient could have increased soreness after the treatment, soreness like after a workout.  Pre-pain score 5/10 post pain score 5/10.  She notes that she feels more stretchy as well as the dysesthesias between her shoulder blades has improved.    OMT:  TREATMENTS IN PARENTHESES  Head: Muscle energy  Cervical: Muscle energy and myofascial release  Ribs: Muscle energy  Thoracic: Muscle energy  Lumbar: Articulatory  Pelvis: Articulatory and muscle energy  Sacrum: Muscle energy

## 2021-06-09 NOTE — PROGRESS NOTES
"Fred De Jesus is a 34 y.o. female who is being evaluated via a billable telephone visit.      The patient has been notified of following:     \"This telephone visit will be conducted via a call between you and your physician/provider. We have found that certain health care needs can be provided without the need for a physical exam.  This service lets us provide the care you need with a short phone conversation.  If a prescription is necessary we can send it directly to your pharmacy.  If lab work is needed we can place an order for that and you can then stop by our lab to have the test done at a later time.    Telephone visits are billed at different rates depending on your insurance coverage. During this emergency period, for some insurers they may be billed the same as an in-person visit.  Please reach out to your insurance provider with any questions.    If during the course of the call the physician/provider feels a telephone visit is not appropriate, you will not be charged for this service.\"    Patient has given verbal consent to a Telephone visit? Yes    What phone number would you like to be contacted at? [phone]    Patient would like to receive their AVS by AVS Preference: Cm.    Additional provider notes:     Internal Medicine Tele Visit  [facility]  Patient Name: Fred De Jesus  Patient Age: 34 y.o.  YOB: 1985  MRN: [id number]    Date of Visit: 7/1/2020  Reason for Tele Visit:   Chief Complaint   Patient presents with     Follow-up     1 week follow up. Depression is going well, and her skin infection has gotten much better.          No orders of the defined types were placed in this encounter.  Followup: Return in about 3 months (around 10/1/2020). earlier if needed.    Health Maintenance Review  Health Maintenance   Topic Date Due     DEPRESSION ACTION PLAN  1985     HIV SCREENING  11/19/1999     ADVANCE CARE PLANNING  " "11/19/2002     PAP SMEAR  06/16/2019     INFLUENZA VACCINE RULE BASED (1) 08/01/2020     PREVENTIVE CARE VISIT  10/18/2020     TD 18+ HE  10/18/2029     TDAP ADULT ONE TIME DOSE  Completed         I am having Fred De Jesus maintain her levonorgestreL, norethindrone, levothyroxine, sertraline, gabapentin, cephalexin, and albuterol.      HPI:  Fred De Jesus is a 34 y.o. year old who presents to the office today with chronic conditions including hypothyroidism, back pain, positive LETICIA, depression, migraines, fibromyalgia.    Patient presents to virtual visit today for 1 week follow up.  Patient noted to have a skin infection on the right breast from scratch. Patient reports improvement.     She was prescribed an inhaler due to wheezing and has found that \"I can breathe a lot easier when using the inhaler\".  She states she does have some coughing with the first puff and resolves with use of second puff.  She reports she feels like she is now taking deeper breathes. She denies any wheezing.       Review of Systems- pertinent positive in bold:  Constitutional: Fever, chills, night sweats, fainting, weight change, fatigue, dizziness, sleeping difficulties, loud snoring/pauses in breathing  Eyes: change in vision, blurred or double vision, redness/eye pain  Ears, nose, mouth, throat: change in hearing, ear pain, hoarseness, difficulty swallowing, sores in the mouth or throat  Respiratory: shortness of breath, cough, bloody sputum, wheezing  Cardiovascular: chest pain, palpitations   Gastrointestinal: abdominal pain, heartburn/indigestion, nausea/vomiting, change in appetite, change in bowel habits, constipation or diarrhea, rectal bleeding/dark stools, difficulty swallowing  Urinary: painful urination, frequent urination, urinary urgency/incontinence, blood in urine/dark urine, nocturia (new or increasing), muscle cramps, swelling of hands, feet, ankles, leg pain/redness  Skin: change in moles/freckles, rash, " nodules  Hematologic/lymphatic: swollen lymph glands, abnormal bruising/bleeding  Endocrine: excessive thirst/urination, cold or heat intolerance  Neurologic/emotional: worrisome memory change, numbness/tingling, anxiety, mood swings      Current Scheduled Meds:  Outpatient Encounter Medications as of 7/1/2020   Medication Sig Dispense Refill     albuterol (PROAIR HFA;PROVENTIL HFA;VENTOLIN HFA) 90 mcg/actuation inhaler Inhale 2 puffs every 6 (six) hours as needed for shortness of breath. 1 each 0     cephalexin (KEFLEX) 500 MG capsule Take 1 capsule (500 mg total) by mouth 3 (three) times a day for 10 days. 30 capsule 0     gabapentin (NEURONTIN) 300 MG capsule Take 1 capsule (300 mg total) by mouth at bedtime. 90 capsule 0     levonorgestrel (MIRENA) 20 mcg/24 hours (5 yrs) 52 mg IUD 1 each by Intrauterine route once.       levothyroxine (SYNTHROID, LEVOTHROID) 75 MCG tablet Take 1 tablet (75 mcg total) by mouth daily. 90 tablet 3     norethindrone (MICRONOR) 0.35 mg tablet Take 1 tablet (0.35 mg total) by mouth daily. 84 tablet 4     sertraline (ZOLOFT) 50 MG tablet Take 1 tablet (50 mg total) by mouth daily. 90 tablet 0     No facility-administered encounter medications on file as of 7/1/2020.      Past Medical History:   Diagnosis Date     Depression      Fatigue      Hypothyroid      Migraine      No past surgical history on file.  Social History     Tobacco Use     Smoking status: Never Smoker     Smokeless tobacco: Never Used   Substance Use Topics     Alcohol use: Yes     Comment: rarely     Drug use: Not on file     Family History   Problem Relation Age of Onset     Cancer Brother          Tumor  in this thigh     Alcohol abuse Brother      Depression Brother      Drug abuse Brother      Early death Brother      Snoring Brother      Thyroid disease Maternal Grandmother      Arthritis Maternal Grandmother      Asthma Maternal Grandmother      Diabetes Maternal Grandmother      Arthritis Mother       Depression Mother      Alcohol abuse Father      Social History     Social History Narrative     Not on file       Objective / Physical Examination:  There were no vitals filed for this visit.  Wt Readings from Last 3 Encounters:   06/26/20 173 lb (78.5 kg)   06/12/20 179 lb (81.2 kg)   05/11/20 179 lb (81.2 kg)     There is no height or weight on file to calculate BMI.           Nadya Zoe Villalpando, CNP      Assessment/Plan:  1. Wheeze  Patient reports improvement with utilization of prn inhaler. Recommend continued prn use    2. Local infection of skin and subcutaneous tissue  Improved.  Recommend completion of antibiotics and follow up prn .        Phone call duration:  5 minutes    Nadya Villalpando CNP

## 2021-06-09 NOTE — PATIENT INSTRUCTIONS - HE
Follow-up visit with Dr. Renee in 2 weeks to discuss injection outcome and determine care plan going forward.       DISCHARGE INSTRUCTIONS    During office hours (8:00 a.m.- 4:00 p.m.) questions or concerns may be answered  by calling [facility] Navigation Nurses at  [phone].  Messages received after hours will be returned the following business day.      In the case of an emergency, please dial 911 or seek assistance at the nearest Emergency Room/Urgent Care facility.     All Patients:    ? You may experience an increase in your symptoms for the first 2 days (It may take anywhere between 2 days- 2 weeks for the steroid to have maximum effect).    ? You may use ice on the injection site, as frequently as 20 minutes each hour if needed.    ? You may take your pain medicine.    ? You may continue taking your regular medication after your injection. If you have had a Medial Branch Block you may resume pain medication once your pain diary is completed.    ? You may shower. No swimming, tub bath or hot tub for 48 hours.  You may remove your bandaid/bandage as soon as you are home.    ? You may resume light activities, as tolerated.    ? Resume your usual diet as tolerated.    ? It is strongly advised that you do not drive for 1-3 hours post injection.    ? If you have had oral sedation:  Do not drive for 8 hours post injection.      ? If you have had IV sedation:  Do not drive for 24 hours post injection.  Do not operate hazardous machinery or make important personal/business decisions for 24 hours.      POSSIBLE STEROID SIDE EFFECTS (If steroid/cortisone was used for your procedure)    -If you experience these symptoms, it should only last for a short period      Swelling of the legs                Skin redness (flushing)       Mouth (oral) irritation     Blood sugar (glucose) levels              Sweats                      Mood changes    Headache    Sleeplessness         POSSIBLE PROCEDURE SIDE  EFFECTS  -Call the Spine Center if you are concerned    Increased Pain             Increased numbness/tingling        Nausea/Vomiting            Bruising/bleeding at site        Redness or swelling                                                Difficulty walking        Weakness             Fever greater than 100.5    *In the event of a severe headache after an epidural steroid injection that is relieved by lying down, please call the [facility] to speak with a clinical staff member*

## 2021-06-09 NOTE — PATIENT INSTRUCTIONS - HE
Please continue doing your physical therapy sessions and exercises.    Osteopathic manipulation was performed today.  You could feel a sense of soreness like a workout type soreness.  Please drink plenty water today.    I have ordered injections for you.  You will need a  for these injections.  Please wear a mask.    ~Please call Nurse Navigation line [phone] with any questions or concerns about your treatment plan, if symptoms worsen and you would like to be seen urgently, or if you have problems controlling bladder and bowel function.

## 2021-06-10 NOTE — TELEPHONE ENCOUNTER
Pt in clinic requesting refill of Levothyroxine.    Pt last visit 7/1/20  Pt due to be seen around 10/1/20    Instructions   Return in about 3 months (around 10/1/2020).

## 2021-06-10 NOTE — PROGRESS NOTES
Assessment:     Diagnoses and all orders for this visit:    Lumbosacral spondylosis without myelopathy  -     OPS Medial Branch Block Bilateral; Future; Expected date: 08/19/2020    Myofascial pain  -     OPS Medial Branch Block Bilateral; Future; Expected date: 08/19/2020    LETICIA positive    Polyarthralgia       Fred De Jesus is a 34 y.o. y.o. female with past medical history significant for LETICIA positive, polyarthralgia, lymphocytopenia, depression, myalgia who presents today for follow-up regarding bilateral L4-5 and L5-S1 facet joint injections done on 7/24/2020:    -Patient reports that she had 100% relief for 2 days after her facet joint injections, however thereafter pain returned to her baseline.  Her pain is likely secondary to lumbar spondylosis without myelopathy.     Plan:     A shared decision making plan was used. The patient's values and choices were respected. Prior medical records from our last visit on 7/15/2020 were reviewed today. The following represents what was discussed and decided upon by the provider and the patient.        -DIAGNOSTIC TESTS: Images were personally reviewed and interpreted.   --MRI of the lumbar spine dated 1/10/2020 is personally viewed images interpreted discussed with patient.  Does show mild facet arthropathy at L5-S1 and L4-5.  There is no foraminal or central canal stenosis.    -INTERVENTIONS: I have ordered bilateral L3, L4, L5 medial branch blocks diagnostic.  She has substantial relief of pain I recommend confirmatory medial branch blocks with an eye to radiofrequency ablation.    -MEDICATIONS: No changes to medications.  -  Discussed side effects of medications and proper use. Patient verbalized understanding.    -PHYSICAL THERAPY: The patient has had a total of 10 sessions of physical therapy 3 being traditional and 7 [facility] physical therapy sessions.  I recommended she continue with this.  Discussed the importance of core strengthening, ROM, stretching  exercises with the patient and how each of these entities is important in decreasing pain.  Explained to the patient that the purpose of physical therapy is to teach the patient a home exercise program.  These exercises need to be performed every day in order to decrease pain and prevent future occurrences of pain.        -PATIENT EDUCATION: We discussed pain management in a multimodal fashion including physical therapy, medication management, possible future injections.    -FOLLOW UP: Patient will follow-up after injections.  Advised to contact clinic if symptoms worsen or change.    Subjective:     Fred De Jesus is a 34 y.o. female who presents today for follow-up regarding low back pain.  Patient reports that she had 2 days of very good relief from her injections.  She noticed that she had no pain and was able to do a lot of things.  She started doing deep cleaning in the next day had severe back pain and worsening of pain again.  She was hopeful that this would have lasted longer.  Pain is worse with prolonged standing, walking, sitting and improved with Tylenol slightly.  She has had 7 sessions of medics physical therapy and 3 sessions of traditional therapy has not found that they have been helpful.  She notes that pain is especially worse after kayaking and she needs to lay down quite often after this for a couple of days.  Her pain today is 4/10 is worse is 10/10 as best as 4/10.  Pain is across her beltline does not radiate down her leg.  It is achy in nature.    -Treatment to Date: MRI this lumbar spine dated 1/1 10/2020.  10 sessions of physical therapy,  of which are [facility] physical therapy.  3 sessions of osteopathic manipulation.  Bilateral L4-5 and L5-S1 facet joint injections done on 7/24/2020.    Patient Active Problem List   Diagnosis     Depression, unspecified depression type     Routine history and physical examination of adult     Myalgia     LETICIA positive     Polyarthralgia      Lymphocytopenia       Current Outpatient Medications on File Prior to Encounter   Medication Sig Dispense Refill     acetaminophen (TYLENOL) 500 MG tablet Take 1,000 mg by mouth every 6 (six) hours as needed for pain.       albuterol (PROAIR HFA;PROVENTIL HFA;VENTOLIN HFA) 90 mcg/actuation inhaler Inhale 2 puffs every 6 (six) hours as needed for shortness of breath. 1 each 0     gabapentin (NEURONTIN) 300 MG capsule Take 1 capsule (300 mg total) by mouth at bedtime. 90 capsule 0     levonorgestrel (MIRENA) 20 mcg/24 hours (5 yrs) 52 mg IUD 1 each by Intrauterine route once.       levothyroxine (SYNTHROID, LEVOTHROID) 75 MCG tablet Take 1 tablet (75 mcg total) by mouth daily. 90 tablet 3     norethindrone (MICRONOR) 0.35 mg tablet Take 1 tablet (0.35 mg total) by mouth daily. 84 tablet 4     sertraline (ZOLOFT) 50 MG tablet Take 1 tablet (50 mg total) by mouth daily. 90 tablet 0     No current facility-administered medications on file prior to encounter.        Allergies   Allergen Reactions     Motrin [Ibuprofen]        Past Medical History:   Diagnosis Date     Depression      Fatigue      Hypothyroid      Migraine         Review of Systems  ROS:  Specifically negative for bowel/bladder dysfunction, balance changes, headache, dizziness, foot drop, fevers, chills, appetite changes, nausea/vomiting, unexplained weight loss. Otherwise 13 systems reviewed are negative. Please see the patient's intake questionnaire from today for details.    Reviewed Social, Family, Past Medical and Past Surgical history with patient, no significant changes noted since prior visit.     Objective:     /62 (Patient Site: Right Arm, Patient Position: Sitting, Cuff Size: Adult Large)   Pulse 68   Temp 98.3  F (36.8  C) (Oral)   Resp 18   SpO2 99%     PHYSICAL EXAMINATION:    --CONSTITUTIONAL: Well developed, well nourished, healthy appearing individual.  --PSYCHIATRIC: Appropriate mood and affect. No difficulty interacting due to  temper, social withdrawal, or memory issues.  --SKIN: Lumbar region is dry and intact.   --RESPIRATORY: Normal rhythm and effort. No abnormal accessory muscle breathing patterns noted.   --MUSCULOSKELETAL:  Normal lumbar lordosis noted, no lateral shift.  --GROSS MOTOR: Easily arises from a seated position. Gait is non-antalgic  --LUMBAR SPINE:  Inspection reveals no evidence of deformity. Range of motion is not limited in lumbar flexion, extension, lateral rotation.  Tenderness palpation along the low back bilaterally.  Facet loading is positive bilaterally.  Straight leg raising in the supine position is negative to radicular pain. Sciatic notch non-tender.   --SACROILIAC JOINT: Negative distraction.  Negative Shelia's with reproduction of pain to affected extremity. One Finger point test negative.  --LOWER EXTREMITY MOTOR TESTING:  Plantar flexion left 5/5, right 5/5   Dorsiflexion left 5/5, right 5/5   Great toe MTP extension left 5/5, right 5/5  Knee flexion left 5/5, right 5/5  Knee extension left 5/5, right 5/5   Hip flexion left 5/5, right 5/5  Hip abduction left 5/5, right 5/5  Hip adduction left 5/5, right 5/5   --HIPS: Full range of motion bilaterally. Stinchfield test is negative bilaterally.  --NEUROLOGIC:  Sensation to light touch is intact in the bilateral L4, L5, and S1 dermatomes.    RESULTS:   Imaging: Lumbar spine imaging was reviewed today. The images were shown to the patient and the findings were explained using a spine model.

## 2021-06-10 NOTE — PATIENT INSTRUCTIONS - HE
I have ordered medial branch blocks for you.  He will need a  for these injections.    ~Please call Nurse Navigation line [phone] with any questions or concerns about your treatment plan, if symptoms worsen and you would like to be seen urgently, or if you have problems controlling bladder and bowel function.

## 2021-06-10 NOTE — PATIENT INSTRUCTIONS - HE
*Return Pain Diary as soon as possible. We will review and get back to you with future plan of care.      DISCHARGE INSTRUCTIONS    During office hours (8:00 a.m.- 4:00 p.m.) questions or concerns may be answered  by calling [facility] Navigation Nurses at  [phone].  Messages received after hours will be returned the following business day.      In the case of an emergency, please dial 911 or seek assistance at the nearest Emergency Room/Urgent Care facility.     All Patients:  ? You may experience an increase in your symptoms for the first 2 days, once the numbing medication wears off.    ? You may resume your regular medication, no pain medication until you have completed your diary    ? You may shower. No swimming, tub bath or hot tub for 24 hours; remove bandage after 4 hours    ? Continue your activities that can cause you pain to test the blocks.                         ? You should not drive for the next 3 to 5 hours (have someone drive you)           POSSIBLE PROCEDURE SIDE EFFECTS  -Call Spine Center if concerned-    Increased Pain  Increased numbness/tingling     Nausea/Vomiting  Bruising/bleeding at site (hematoma)             Swelling at site (edema) Headache  Difficulty walking  Infection        Fever greater than 100.5

## 2021-06-10 NOTE — TELEPHONE ENCOUNTER
Refill Approved    Rx renewed per Medication Renewal Policy. Medication was last renewed on 6/26/20.    Enedina Gomez, [facility] Triage/Med Refill 8/28/2020     Requested Prescriptions   Pending Prescriptions Disp Refills     sertraline (ZOLOFT) 50 MG tablet 90 tablet 0     Sig: Take 1 tablet (50 mg total) by mouth daily.       SSRI Refill Protocol  Passed - 8/25/2020  6:30 PM        Passed - PCP or prescribing provider visit in last year     Last office visit with prescriber/PCP: Visit date not found OR same dept: 6/26/2020 Nadya Villalpando CNP OR same specialty: 6/26/2020 Nadya Villalpando CNP  Last physical: Visit date not found Last MTM visit: Visit date not found   Next visit within 3 mo: Visit date not found  Next physical within 3 mo: Visit date not found  Prescriber OR PCP: Generic Provider [facility]  Last diagnosis associated with med order: 1. Depression, unspecified depression type  - sertraline (ZOLOFT) 50 MG tablet; Take 1 tablet (50 mg total) by mouth daily.  Dispense: 90 tablet; Refill: 0    If protocol passes may refill for 12 months if within 3 months of last provider visit (or a total of 15 months).

## 2021-06-11 NOTE — PROGRESS NOTES
S: The patient is here in follow up of her periods.  See notes from 7/30/19 and 4/16/19.  She has been diagnosed with IBS and is getting cramping from that.  It has improved as she's been on medication and dietary changes for that.  She does still get abdominal pain and back pain on a regular basis.  She is also having issues with her depression, PTSD, and binge eating and would like to see a counselor for those issues.  She tried to get involved with the [employer] [facility] but it was expensive, and the timing didn't work around her job.    Outpatient Medications Prior to Visit   Medication Sig Dispense Refill     acetaminophen (TYLENOL) 500 MG tablet Take 1,000 mg by mouth every 6 (six) hours as needed for pain.       albuterol (PROAIR HFA;PROVENTIL HFA;VENTOLIN HFA) 90 mcg/actuation inhaler Inhale 2 puffs every 6 (six) hours as needed for shortness of breath. 1 each 0     gabapentin (NEURONTIN) 300 MG capsule Take 1 capsule (300 mg total) by mouth at bedtime. 90 capsule 0     levonorgestrel (MIRENA) 20 mcg/24 hours (5 yrs) 52 mg IUD 1 each by Intrauterine route once.       levothyroxine (SYNTHROID, LEVOTHROID) 75 MCG tablet Take 1 tablet (75 mcg total) by mouth daily. 90 tablet 3     norethindrone (MICRONOR) 0.35 mg tablet Take 1 tablet (0.35 mg total) by mouth daily. 84 tablet 4     sertraline (ZOLOFT) 50 MG tablet Take 1 tablet (50 mg total) by mouth daily. 90 tablet 0     amitriptyline (ELAVIL) 10 MG tablet Take 10 mg by mouth at bedtime.       No facility-administered medications prior to visit.        Patient is allergic to motrin [ibuprofen].    O:  /64 (Patient Site: Right Arm, Patient Position: Sitting, Cuff Size: Adult Regular)   Pulse 80   Wt 179 lb (81.2 kg)           Body mass index is 29.79 kg/m .    General: WN/WD WF, NAD    Assessment: 34 y.o. SWF P0 with menorrhagia, IBS, and depression.    Plan: We discussed her periods and the other symptoms she's having.  She would like to try progestin only  OCPs again as she has done well with them in the past.  Prescription was sent in with the reminder that they aren't as effective for contraception as combined OCPs.  Referral was placed for Mental Health to see if she can get some improvement in her symptoms.  Questions were answered.  Approximately 20 minutes were spent with the patient with the majority in counseling.

## 2021-06-11 NOTE — TELEPHONE ENCOUNTER
[facility] pharmacy called the [facility] by [facility] - they need a RX ok'd for pt's Levothyroxine. [facility] has called and faxed to the [phone] fax number for [facility], no response. Pt has been in to  the med. Pls call [facility] at [phone] asap. Thank you

## 2021-06-11 NOTE — PROGRESS NOTES
Walk In Care Note                                                        Date of Visit: 9/4/2020     Chief Complaint   Fred De Jesus is a(n) 34 y.o. White or  female who presents to Walk In [facility] with the following complaint(s):  Conjunctivitis (x3 days. perscribed eyedrops yesterday, used them and R eye is now stuck shut/ leaking discharge)       Assessment and Plan   1. Acute bacterial conjunctivitis of left eye  - erythromycin ophthalmic ointment; Apply a 1 cm ribbon inside the left lower eyelid every 4 hours while awake x 7 days.  Dispense: 3.5 g; Refill: 0      Patient presenting with 3-day history of worsening left eye conjunctivitis. She was seen at the [facility] yesterday and prescribed Polytrim drops but symptoms have continued to worsen. Fluorescein exam is negative for corneal abrasion, corneal ulceration, and foreign bodies. Patient is LETICIA positive but has not been formally diagnosed with rheumatologic disease. Have low suspicion for anterior uveitis given distribution of conjunctival erythema, absence of pupillary constriction, and relatively mild eye pain. Patient does not wear contact lenses, so concern for Pseudomonas infection is low. Discontinuing Polytrim drops at this time. Treating with erythromycin ointment for both antibiotic and lubricating effects. Recommended use of warm compresses as needed for mattering and cool compresses as needed for itching / irritation.     Counseled patient regarding assessment and plan for evaluation and treatment. Questions were answered. See AVS for the specific written instructions and educational handout(s) regarding conjunctivitis that were provided at the conclusion of the visit.     Discussed signs / symptoms that warrant urgent / emergent medical attention.     Follow up within 2 days if not improving. Ophthalmology consultation may be indicated in that situation.      History of Present Illness   Primary symptom: Eye redness  Laterality:  Left  Onset: 3 days ago  Progression: Worsening  Conjunctival redness: Yes  Mattering: Yes  Drainage: Yes, yellow, now slightly pink  Irritation/Itching: Yes  Pain: Pressure  Foreign body sensation: No  Eyelid swelling: Yes, starting today  Eyelid erythema: No  Blurry vision: Yes  Vision loss: No  Photophobia: Yes  Fevers: No  Chills: No  URI symptoms: None  Additional symptoms: None  Known trauma: No  Suspected foreign body: No  Exposure to infectious conjunctivitis: No  Contact lens wearer: No  Chronic ophthalmologic conditions: None  Home therapies utilized: Was seen at the [facility] yesterday and was prescribed Polytrim. Applying warm compresses.      Review of Systems   Review of Systems   All other systems reviewed and are negative.       Physical Exam   Vitals:    09/04/20 1647   BP: 102/68   Patient Site: Right Arm   Patient Position: Sitting   Cuff Size: Adult Regular   Pulse: 66   Resp: 16   Temp: 98.3  F (36.8  C)   TempSrc: Oral   SpO2: 100%   Weight: 177 lb 4.8 oz (80.4 kg)        Visual Acuity Screening    Right eye Left eye Both eyes   Without correction: 10/25 10/16    With correction:          Physical Exam  Vitals signs and nursing note reviewed.   Constitutional:       General: She is not in acute distress.     Appearance: She is well-developed and normal weight. She is not ill-appearing or toxic-appearing.   HENT:      Head: Normocephalic and atraumatic.      Right Ear: Tympanic membrane, ear canal and external ear normal.      Left Ear: Tympanic membrane, ear canal and external ear normal.      Nose: No mucosal edema or rhinorrhea.      Mouth/Throat:      Mouth: Mucous membranes are moist. No oral lesions.      Pharynx: Uvula midline. No oropharyngeal exudate or posterior oropharyngeal erythema.   Eyes:      General: Lids are normal. Gaze aligned appropriately.         Right eye: No foreign body, discharge or hordeolum.         Left eye: Discharge (tearing) present.No foreign body or  hordeolum.      Extraocular Movements: Extraocular movements intact.      Conjunctiva/sclera:      Right eye: Right conjunctiva is not injected. No exudate.     Left eye: Left conjunctiva is injected. Exudate present. No chemosis or hemorrhage.     Pupils: Pupils are equal, round, and reactive to light.      Left eye: No corneal abrasion or fluorescein uptake.      Funduscopic exam:     Right eye: Red reflex present.         Left eye: Red reflex present.     Slit lamp exam:     Left eye: Photophobia (mild) present. No corneal flare, corneal ulcer or foreign body.   Neck:      Musculoskeletal: Neck supple. No edema or erythema.   Cardiovascular:      Rate and Rhythm: Normal rate and regular rhythm.      Heart sounds: S1 normal and S2 normal. No murmur. No friction rub. No gallop.    Pulmonary:      Effort: Pulmonary effort is normal.      Breath sounds: Normal breath sounds. No stridor. No wheezing, rhonchi or rales.   Lymphadenopathy:      Head:      Right side of head: No preauricular adenopathy.      Left side of head: No preauricular adenopathy.      Cervical: No cervical adenopathy.   Skin:     General: Skin is warm and dry.      Coloration: Skin is not pale.      Findings: No rash.   Neurological:      General: No focal deficit present.      Mental Status: She is alert and oriented to person, place, and time.   Psychiatric:         Mood and Affect: Affect normal. Mood is anxious.          Diagnostic Studies   Laboratory:  N/A  Radiology:  N/A  Electrocardiogram:  N/A     Procedure Note   N/A     Pertinent History   The following portions of the patient's history were reviewed and updated as appropriate: allergies, current medications, past family history, past medical history, past social history, past surgical history and problem list.    Patient has Depression, unspecified depression type; Routine history and physical examination of adult; Myalgia; LETICIA positive; Polyarthralgia; and Lymphocytopenia on their  problem list.    Patient has a past medical history of Depression, Fatigue, Hypothyroid, and Migraine.    Patient has no past surgical history on file.    Patient's family history includes Alcohol abuse in her brother and father; Arthritis in her maternal grandmother and mother; Asthma in her maternal grandmother; Cancer in her brother; Depression in her brother and mother; Diabetes in her maternal grandmother; Drug abuse in her brother; Early death in her brother; Snoring in her brother; Thyroid disease in her maternal grandmother.    Patient reports that she has never smoked. She has never used smokeless tobacco. She reports current alcohol use.     Portions of this note have been dictated using voice recognition software. Any grammatical or contextual distortions are unintentional and inherent to the software.    Bulmaro Bradley MD  [facility]

## 2021-06-11 NOTE — TELEPHONE ENCOUNTER
Called to pharmacy- they are not sure what happened. Looks like patient did  script on 8/26. They said to disregard the message and request

## 2021-06-11 NOTE — TELEPHONE ENCOUNTER
Seen at [facility] clinic yesterday. Pt believe she has pink eye.   Pt was prescribed eye drops-- polymyxin B Sulfate/Trimethoprim, this morning she woke up and eye was almost completed crusted shut.   Feels like sand in eye, constant watering, unable to open the left eye.   Patient has had 6 doses of the eye drops, sx continue to worsen.   Discharge is pink in color.     RN recommended patient be seen for evaluation due to change in sx and being a lot worse since yesterday.     Patient agreed to plan and was transferred to schedule appointment in clinic today. RN recommended if unable to schedule appointment in clinic to go to [facility] for evaluation.    RN advised of having someone else drive her due to being unable  To see out of the left eye.     Madhavi Mueller RN 09/04/20 2:59 PM  [facility] Nurse Advisor      Reason for Disposition    Blurred vision    Eyelids are very swollen (shut or almost)    Eyelid (outer) is very red and painful (or tender to touch)    MODERATE eye pain (e.g., interferes with normal activities)    Additional Information    Negative: Eye exposure to chemical or fumes    Negative: Redness of white of eye (sclera), but no pus or only a small amount of brief pus    Negative: SEVERE pain (e.g., excruciating)    Negative: Patient sounds very sick or weak to the triager    Negative: Cloudy spot or sore seen on the cornea (clear part of the eye)    Protocols used: EYE - PUS OR [id number]

## 2021-06-11 NOTE — PROGRESS NOTES
"Mental Health Visit Note    Patient: Fred De Jesus    : 1985 MRN: [id number]    2020    Start time: 1300    Stop Time: 1352   Session # 1    Session Type: Patient is presenting for an Individual session.    Fred De Jesus is a 34 y.o. female  is being seen per referral from  Nadya Villalpando CNP who presents with verbalizing difficulty managing mood as result of current medical concerns, family of origin issues and current pandemic.  Patient is single, has 2 living brothers 1 who  in his 20s from MS.  Patient describes herself as \"a hermit\" and indicated that she does not mind working remotely rather than going into the office.   She is hoping someday to be in a relationship but finds it challenging to meet other individuals.  Her goal for therapy is to have a better relationship with her mother and to understand better her sense of self.    Telemedicine Visit: The patient's condition can be safely assessed and treated via synchronous audio and visual telemedicine encounter.      Reason for Telemedicine Visit: Patient has requested telehealth visit    Originating Site (Patient Location): Patient's home    Distant Site (Provider Location): Provider Remote Setting- Home Office    Consent:  The patient/guardian has verbally consented to: the potential risks and benefits of telemedicine (video visit) versus in person care; bill my insurance or make self-payment for services provided; and responsibility for payment of non-covered services.     Mode of Communication:  Video Conference via [email or web address]    As the provider I attest to compliance with applicable laws and regulations related to telemedicine.    Those present for this visit patient and therapist    New symptoms or complaints: Feeling nervous and anxious and on edge, not being able to control stop worrying, worrying too much about different things, trouble relaxing, restlessness, periods of irritability, feeling afraid " something awful may happen, feeling sad, decreased pleasure in doing things, poor appetite and little energy. Poor sense of self.    Functional Impairment:   Personal: 4  Family: 4  Social: 2  Work: 1    Clinical assessment of mental status:   Fred De Jesus presented on time.   She was oriented x3, open and cooperative, and dressed appropriately for this session and weather. Her memory was Normal cognitive functioning .  Her speech was  Within normal.  Language was clear  Concentration and focus is Within normal. Psychosis is not noted or reported. She reports her mood is Congruent w/content of speech.  Affect is congruent with speech and is Congruent w/content of speech.  Fund of knowledge is adequate. Insight is adequate for therapy.    ASSESSMENT: Current Emotional / Mental Status (status of significant symptoms):              Risk status (Self / Other harm or suicidal ideation)              Patient denies any personal safety issues              Patient denies current or recent suicidal ideation or behaviors.              Patient denies current or recent homicidal ideation or behaviors.              Patient denies current or recent self injurious behavior or ideation.              Patient denies other safety concerns.              Patient denies any risk factors              Patient denies any protective factors                            Attitude:                                   Cooperative               Orientation:                             Oriented x3              Speech              Rate / Production:       Normal/ Responsive Normal                           Volume:                       Normal               Mood:                                      Normal              Thought Content:                    Clear               Thought Form:                        Coherent  Logical               Insight:                                     Good     Patient's impression of their current status: Patient  "stated, is just so hard at times to find anything I never know how my pain is going to be and I not understanding I am experiencing this pain.\"    Therapist impression of patients current state: This 34 y.o. White or  female presents with verbalizing difficulty managing mood as result of current medical concerns as well as, family of origin issues and current pandemic. Pt. Is motivated to make changes in her life.    Assessment tools used today include: PHQ-9, INA-7 and C-SSRS can be found documented in Doc Flowsheets.     Type of psychotherapeutic technique provided: Insight oriented and Solution-focused    Progress toward short term goals Not yet established    Review of long term goals  Not yet established    Diagnosis:   1. Depression unspecified  2. Generalized anxiety disorder    Plan and Follow-up:   1. Patient plans to implement relaxation strategies into daily routine.  2  Patient plans to continue taking the medication as prescribed.   3. Patient plans to follow up with therapy in two weeks.   4.  Patient plans on continuing to seek solutions for pain and management of            symptoms    Discharge Criteria/Planning: Patient will continue with follow-up until therapy can be discontinued without return of signs and symptoms.    I have reviewed the note as documented above.  This accurately captures the substance of my conversation with the patient.    As the provider I attest to compliance with applicable laws and regulations related to telemedicine.  Performed and documented by    Marilyn LUNDY  9/2/20    "

## 2021-06-11 NOTE — TELEPHONE ENCOUNTER
RN cannot approve Refill Request    RN can NOT refill this medication med is not covered by policy/route to provider. Last office visit: 6/26/2020 Nadya Villalpando CNP Last Physical: 10/18/2019 Last MTM visit: Visit date not found Last visit same specialty: 6/26/2020 Nadya Villalpando CNP.  Next visit within 3 mo: Visit date not found  Next physical within 3 mo: Visit date not found      Enedina Gomez, [facility] Triage/Med Refill 9/17/2020    Requested Prescriptions   Pending Prescriptions Disp Refills     norethindrone (MICRONOR) 0.35 mg tablet 84 tablet 4     Sig: Take 1 tablet (0.35 mg total) by mouth daily.       There is no refill protocol information for this order

## 2021-06-11 NOTE — TELEPHONE ENCOUNTER
Refill Approved    Rx renewed per Medication Renewal Policy. Medication was last renewed on 6/26/20.    Enedina Gomez, [facility] Triage/Med Refill 9/10/2020     Requested Prescriptions   Pending Prescriptions Disp Refills     gabapentin (NEURONTIN) 300 MG capsule 90 capsule 0     Sig: Take 1 capsule (300 mg total) by mouth at bedtime.       Gabapentin/Levetiracetam/Tiagabine Refill Protocol  Passed - 9/8/2020  8:03 PM        Passed - PCP or prescribing provider visit in past 12 months or next 3 months     Last office visit with prescriber/PCP: Visit date not found OR same dept: 6/26/2020 Nadya Villalpando CNP OR same specialty: 6/26/2020 Nadya Villalpando CNP  Last physical: Visit date not found Last MTM visit: Visit date not found   Next visit within 3 mo: Visit date not found  Next physical within 3 mo: Visit date not found  Prescriber OR PCP: Generic Provider MyChart  Last diagnosis associated with med order: 1. Osteoarthritis of spine with radiculopathy, lumbar region  - gabapentin (NEURONTIN) 300 MG capsule; Take 1 capsule (300 mg total) by mouth at bedtime.  Dispense: 90 capsule; Refill: 0    If protocol passes may refill for 12 months if within 3 months of last provider visit (or a total of 15 months).

## 2021-06-12 NOTE — TELEPHONE ENCOUNTER
Called and lvm for patient to call back to clarify what form she is needing. Unsure of an Adult intake form. Is this from primary care?    Please get information

## 2021-06-12 NOTE — PROGRESS NOTES
Mental Health Visit Note    Patient: Fred De Jesus    : 1985 MRN: [id number]    10/19/2020    Start time:1300    Stop Time: 1352   Session # 2    Session Type: Patient is presenting for an Individual session.    Fred De Jesus is a 34 y.o. female is being seen today for follow-up visit related to psychosocial stressors, medical concerns, family of origin issues and current pandemic.     Telemedicine Visit: The patient's condition can be safely assessed and treated via synchronous audio and visual telemedicine encounter.      Reason for Telemedicine Visit: Patient has requested telehealth visit    Originating Site (Patient Location): Patient's home    Distant Site (Provider Location): Provider Remote Setting- Home Office    Consent:  The patient/guardian has verbally consented to: the potential risks and benefits of telemedicine (video visit) versus in person care; bill my insurance or make self-payment for services provided; and responsibility for payment of non-covered services.     Mode of Communication:  Video Conference via [employer]    As the provider I attest to compliance with applicable laws and regulations related to telemedicine.    Those present for this visit Patient and Therapist    Follow up: Patient indicated that she is sleeping well, although is experiencing increased anxiety due to the amount of hours that she is working and job demands.      New symptoms or complaints:Feeling nervous and anxious and on edge, not being able to control stop worrying, worrying too much about different things, trouble relaxing, restlessness, periods of irritability, feeling afraid something awful may happen, feeling sad, decreased pleasure in doing things, poor appetite and little energy. Poor sense of self.    Functional Impairment:   Personal: 4  Family: 4  Social: 2  Work: 4    Clinical assessment of mental status:   Fred De Jesus presented on time.   She was oriented x3, open and cooperative,  and dressed appropriately for this session and weather. Her memory was Normal cognitive functioning .  Her speech was  Within normal.  Language was clear.  Concentration and focus is Within normal. Psychosis is not noted or reported. She reports her mood is Congruent w/content of speech.  Affect is congruent with speech and is Congruent w/content of speech.  Fund of knowledge is adequate. Insight is adequate for therapy.    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              Patient denies current or recent suicidal ideation or behaviors.              Patient denies current or recent homicidal ideation or behaviors.              Patient denies current or recent self injurious behavior or ideation.              Patient denies other safety concerns.              Patient denies any risk issues                           Attitude:                                   Cooperative               Orientation:                             Oriented x3              Speech                                    Clear                          Rate / Production:       Normal/ Responsive Normal                           Volume:                       Normal               Mood:                                      Normal              Thought Content:                    Clear               Thought Form:                        Coherent  Logical               Insight:                                     Good     Patient's impression of their current status: Patient stated, there have been other individuals of my employment who were asked to leave and I worry about that happening to me and that makes me very nervous.    Therapist impression of patients current state: This 34  y.o. White or  female presents for follow-up visit.  During the session we discussed assertiveness skill building in order for patient to gain confidence in her abilities to communicate effectively.  Stop thought processing techniques were applied to combating negative and self-defeating thoughts.     Assessment tools used today include: Global assessment and review of INA-7 and PHQ-9 (can be found documented in Doc Flowsheets).     Type of psychotherapeutic technique provided: Insight oriented and Solution-focused assertiveness skill building    Progress toward short term goals: Not yet met patient continuing to complete intake paperwork.    Review of long term goals: Not yet established     Diagnosis:   1. Depression unspecified  2. Generalized anxiety disorder     Plan and Follow-up:   1. Patient plans to implement relaxation strategies into daily routine.  2  Patient plans to continue taking the medication as prescribed.   3. Patient plans to follow up with therapy in two weeks.   4. Patient plans on continuing to seek solutions for pain and management of symptoms  5. Patient to complete paperwork and return to provider  6.  Patient will initiate weekly communication to her manager.     Discharge Criteria/Planning: Patient will continue with follow-up until therapy can be discontinued without return of signs and symptoms.    I have reviewed the note as documented above.  This accurately captures the substance of my conversation with the patient.    As the provider I attest to compliance with applicable laws and regulations related to telemedicine.  Performed and documented by   Marilyn THURMAN  10/19/20

## 2021-06-12 NOTE — TELEPHONE ENCOUNTER
"Patient calling to find out if [facility] wants to move forward with the confirmatory MBBs. Initial set was done 8/28/20. She scanned and sent the pain diary back through [facility] (see 8/28/20 \"patient entered attachment\" for document). She has been seen by neurology in the interim to rule out other problems as she had a brother recently diagnosed with MS.     Message sent on to [facility] to review the pain diary.   "

## 2021-06-12 NOTE — TELEPHONE ENCOUNTER
Who is calling:  Patient   Reason for Call:  Patient stated she would like another copy of the Adult Intake Form mailed out to her home address on file. Patient stated she spilled coffee on the one she received by mail today.  Date of last appointment with primary care: n/a  Okay to leave a detailed message: Yes  [phone]

## 2021-06-12 NOTE — TELEPHONE ENCOUNTER
Per Dr. Renee, he does recommend proceeding with confirmatory medial branch blocks at bilateral L3, L4, L5.    Order placed in Epic for the recommended injections.     Phone call to patient to inform [facility] has recommended moving forward with the confirmatory MBBs. Injection requirements reviewed with patient. Stated understanding. Transferred to scheduling to make appointment.

## 2021-06-13 NOTE — TELEPHONE ENCOUNTER
Patient contacted and informed that the prior authorization had been received for her radiofrequency ablation, so she could move forward with scheduling the procedure.  The patient was in agreement with the plan and wished to proceed.    Procedure requirements were reviewed to the patient's stated understanding.  She was transferred to the scheduling team to assist with securing an appointment for the procedure with Dr. Renee.    The patient was encouraged to contact the [facility] if she had any questions or concerns prior to her next scheduled procedure.

## 2021-06-13 NOTE — PROGRESS NOTES
Mental Health Visit Note    Patient: Fred De Jesus    : 1985 MRN: [id number]  20   Start time: 1600   Stop Time: 1652  Session # 3    Session Type: Patient is presenting for an Individual session.     Fred De Jesus is a 34 y.o. female is being seen today for follow-up visit related to psychosocial stressors, medical concerns, family of origin issues and current pandemic.      Telemedicine Visit: The patient's condition can be safely assessed and treated via synchronous audio and visual telemedicine encounter.      Reason for Telemedicine Visit: Patient has requested telehealth visit    Originating Site (Patient Location): Patient's home    Distant Site (Provider Location): Provider Remote Setting- Home Office    Consent:  The patient/guardian has verbally consented to: the potential risks and benefits of telemedicine (video visit) versus in person care; bill my insurance or make self-payment for services provided; and responsibility for payment of non-covered services.     Mode of Communication:  Video Conference via Grow[employer]    As the provider I attest to compliance with applicable laws and regulations related to telemedicine.    Those present for this visit patient and therapist    Follow up Patient indicated that she is trying to manage her workload better and understands that if she is feeling overly stressed and is more difficult to get the sleep.  She also is aware that when she is on devices prior to going to sleep its more difficult to do so.    New symptoms or complaints: Feeling nervous and on edge difficulty controlling worry difficulty sleeping poor energy and sense of worth.    Functional Impairment:   Personal: 4  Family: 4  Work: 2  Social:4  Clinical assessment of mental status:   Fred De Jesus presented on time.   She was oriented x3, open and cooperative, and dressed appropriately for this session and weather. Her memory was Normal cognitive functioning .  Her speech  was  Within normal.  Language was clear.  Concentration and focus is Within normal. Psychosis is not noted or reported. She reports her mood is Congruent w/content of speech.  Affect is congruent with speech and is Congruent w/content of speech.  Fund of knowledge is adequate. Insight is adequate for therapy.     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              Patient denies current or recent suicidal ideation or behaviors.              Patient denies current or recent homicidal ideation or behaviors.              Patient denies current or recent self injurious behavior or ideation.              Patient denies other safety concerns.              Patient denies any risk issues                           Attitude:                                   Cooperative               Orientation:                             Oriented x3              Speech                                    Clear                          Rate / Production:       Normal/ Responsive Normal                           Volume:                       Normal               Mood:                                      Normal              Thought Content:                    Clear               Thought Form:                        Coherent  Logical               Insight:                                     Good                          Patient's impression of their current status: Patient stated that she is trying to be more proactive at work and not put as much energy and to worrying which increases her anxiety.    Therapist impression of patients current state: During the session we discussed step thought processing techniques and ways in which she can eliminate and/or decrease those  negative thoughts when they occur.  We also explored self-concept and self-esteem and ways in which she can feel more confident in her work and personal life.    Type of psychotherapeutic technique provided: Insight oriented and Solution-focused    Progress toward short term goals: Patient to complete intake form    Review of long term goals: Not yet established     Diagnosis:  1.  Depression unspecified  2.  Generalized anxiety disorder    Plan and Follow up:   1.  Patient plans to implement relaxation strategies into daily routine.  2.  Patient plans to continue to seek solutions for pain and management of symptoms.  3.  Patient will continue initiating weekly communication with her manager.  4.  Follow-up visit in 2 weeks.    Discharge Criteria/Planning: Patient will continue with follow-up until therapy can be discontinued without return of signs and symptoms.              I have reviewed the note as documented above.  This accurately captures the substance of my conversation with the patient.  As the provider I attest to compliance with applicable laws and regulations related to telemedicine.  Performed and documented by  Marilyn Arora [facility]  11/11/20

## 2021-06-13 NOTE — PROGRESS NOTES
Mental Health Visit Note    Patient: Fred De Jesus    : 1985 MRN: [id number]    2020    Start time: 1623 Stop Time: 1715   Session # 4  Session Type: Patient is presenting for an Individual session.    Fred De Jesus is a 35 y.o. female is being seen today for follow-up visit.     Telemedicine Visit: The patient's condition can be safely assessed and treated via synchronous audio and visual telemedicine encounter.      Reason for Telemedicine Visit: Patient has requested telehealth visit    Originating Site (Patient Location): Patient's home    Distant Site (Provider Location): Provider Remote Setting- Home Office    Consent:  The patient/guardian has verbally consented to: the potential risks and benefits of telemedicine (video visit) versus in person care; bill my insurance or make self-payment for services provided; and responsibility for payment of non-covered services.     Mode of Communication:  Video Conference via [employer]    As the provider I attest to compliance with applicable laws and regulations related to telemedicine.    Those present for this visit (Patient and Therapist)    Follow up Has been experiencing back pain. Maybe transferring out of state for work.    New symptoms or complaints: back pain, stomach pain     Functional Impairment:   Personal: 4  Family: 2  Work: 2  Social:3          ASSESSMENT: Current Emotional / Mental Status (status of significant symptoms):              Risk status (Self / Other harm or suicidal ideation)              Patient denies any risk issues               Patient denies current or recent suicidal ideation or behaviors.              Patient denies current or recent homicidal ideation or behaviors.              Patient denies current or recent self injurious behavior or ideation.              Patient denies other safety concerns.              Patient indicated family is involved and supportive.                              Attitude:             "                       Cooperative               Orientation:                            Oriented x3              Speech                                   Clear               Rate / Production:       Normal/ Responsive Normal                           Volume:                       Normal               Mood:                                      Normal              Thought Content:                    Clear               Thought Form:                        Coherent  Logical               Insight:                                     Good       Patient's impression of their current status: Pt. stated, \"I thought that I was doing OK but lately I've has to deal with a number of issues right now\"         Therapist impression of patients current state: Patient indicated that she has been experiencing a lot of medical issues right now that are impacting her mood.  We discussed during the session how pain can affect every aspect of her life.  She is hoping that within the next month or two that will be under better control and she can resume setting goals both in her professional life and personal life.     Type of psychotherapeutic technique provided: Insight oriented and Solution-focused    Progress toward short term goals: Not yet established until DA is completed    Review of long term goals:  Not yet established until DA is  completed    Diagnosis:  1. Depression unspecified  2. Generalized anxiety disorder      Plan and Follow up:   1.  Pt working on homework assignments and skills learned in therapy between sessions  2.  Continue taking prescribed medication per PCP  3.   Complete [facility]    Discharge Criteria/Planning: Patient will continue with follow-up until therapy can be discontinued without return of signs and symptoms.    I have reviewed the note as documented above.  This accurately captures the substance of my conversation with the patient.  As the provider I attest to compliance with applicable laws and " regulations related to telemedicine.  Performed and documented by   Marilyn Arora [facility]  12/2/20

## 2021-06-13 NOTE — PATIENT INSTRUCTIONS - HE
Please complete your pain diary and return the diary to the [facility] at your earliest convenience.  The [facility] will contact you to schedule your next appointment after your pain diary is reviewed by your doctor.  Thank you.    DISCHARGE INSTRUCTIONS    During office hours (8:00 a.m.- 4:00 p.m.) questions or concerns may be answered  by calling [facility] Center Navigation Nurses at  [phone].  Messages received after hours will be returned the following business day.      In the case of an emergency, please dial 911 or seek assistance at the nearest Emergency Room/Urgent Care facility.     All Patients:  ? You may experience an increase in your symptoms for the first 2 days, once the numbing medication wears off.    ? You may resume your regular medication, no pain medication until you have completed your diary    ? You may shower. No swimming, tub bath or hot tub for 24 hours; remove bandage after 4 hours    ? Continue your activities that can cause you pain to test the blocks.                         ? You should not drive for the next 3 to 5 hours (have someone drive you)           POSSIBLE PROCEDURE SIDE EFFECTS  -Call Spine Center if concerned-    Increased Pain  Increased numbness/tingling     Nausea/Vomiting  Bruising/bleeding at site (hematoma)             Swelling at site (edema) Headache  Difficulty walking  Infection        Fever greater than 100.5

## 2021-06-14 NOTE — TELEPHONE ENCOUNTER
Refill Approved    Rx renewed per Medication Renewal Policy. Medication was last renewed on 9/29/20.  Patient switched pharmacies to mail order.     Judi Acosta, [facility] Triage/Med Refill 1/29/2021     Requested Prescriptions   Pending Prescriptions Disp Refills     levothyroxine (SYNTHROID, LEVOTHROID) 75 MCG tablet 90 tablet 2     Sig: Take 1 tablet (75 mcg total) by mouth daily.       Thyroid Hormones Protocol Passed - 1/29/2021  1:08 PM        Passed - Provider visit in past 12 months or next 3 months     Last office visit with prescriber/PCP: 6/26/2020 Nadya Villalpando CNP OR same dept: 6/26/2020 Nadya Villalpando CNP OR same specialty: 6/26/2020 Nadya Villalpando CNP  Last physical: 10/18/2019 Last MTM visit: Visit date not found   Next visit within 3 mo: Visit date not found  Next physical within 3 mo: Visit date not found  Prescriber OR PCP: Nadya Villalpando CNP  Last diagnosis associated with med order: 1. Hypothyroidism, unspecified type  - levothyroxine (SYNTHROID, LEVOTHROID) 75 MCG tablet; Take 1 tablet (75 mcg total) by mouth daily.  Dispense: 90 tablet; Refill: 2    If protocol passes may refill for 12 months if within 3 months of last provider visit (or a total of 15 months).             Passed - TSH on file in past 12 months for patient age 12 & older     TSH   Date Value Ref Range Status   09/28/2020 1.11 0.30 - 5.00 uIU/mL Final                        Judi Acosta RN, BSN [facility] Triage Nurse

## 2021-06-14 NOTE — PATIENT INSTRUCTIONS - HE
DISCHARGE INSTRUCTIONS    During office hours (8:00 a.m.- 4:00 p.m.) questions or concerns may be answered  by calling [facility] Navigation Nurses at  [phone].  Messages received after hours will be returned the following business day.      In the case of an emergency, please dial 911 or seek assistance at the nearest Emergency Room/Urgent Care facility.     All Patients:    ? You may experience an increase in your symptoms for the first 2 days (It may take anywhere between 2 days- 2 weeks for the steroid to have maximum effect).    ? You may use ice on the injection site, as frequently as 20 minutes each hour if needed.    ? You may take your pain medicine.    ? You may continue taking your regular medication after your injection. If you have had a Medial Branch Block you may resume pain medication once your pain diary is completed.    ? You may shower. No swimming, tub bath or hot tub for 48 hours.  You may remove your bandaid/bandage as soon as you are home.    ? You may resume light activities, as tolerated.    ? Resume your usual diet as tolerated.    ? It is strongly advised that you do not drive for 1-3 hours post injection.    ? If you have had oral sedation:  Do not drive for 8 hours post injection.      ? If you have had IV sedation:  Do not drive for 24 hours post injection.  Do not operate hazardous machinery or make important personal/business decisions for 24 hours.      POSSIBLE STEROID SIDE EFFECTS (If steroid/cortisone was used for your procedure)    -If you experience these symptoms, it should only last for a short period      Swelling of the legs                Skin redness (flushing)       Mouth (oral) irritation     Blood sugar (glucose) levels              Sweats                      Mood changes    Headache    Sleeplessness    Weakened immune system for up to 14 days, which could increase the risk of becky the COVID-19 virus and/or experiencing more severe symptoms of the  disease, if exposed.    Decreased effectiveness of the flu vaccine if given within 2 weeks of the steroid.         POSSIBLE PROCEDURE SIDE EFFECTS  -Call the Spine Center if you are concerned    Increased Pain             Increased numbness/tingling        Nausea/Vomiting            Bruising/bleeding at site        Redness or swelling                                                Difficulty walking        Weakness             Fever greater than 100.5    *In the event of a severe headache after an epidural steroid injection that is relieved by lying down, please call the [facility] to speak with a clinical staff member*

## 2021-06-14 NOTE — TELEPHONE ENCOUNTER
RN Triage:  reference number:  [id number]     Synthyroid 75 mcg   Refill requested.     Please see refill encounter with today's date.     Judi Acosta RN, BSN [facility] Triage Nurse        Reason for Disposition    Caller requesting a refill, no triage required, and triager able to refill per department policy    Additional Information    Negative: Drug overdose and triager unable to answer question    Negative: Caller requesting information unrelated to medicine    Negative: Caller requesting a prescription for Strep throat and has a positive culture result    Negative: Rash while taking a medication or within 3 days of stopping it    Negative: Immunization reaction suspected    Negative: Asthma and having symptoms of asthma (cough, wheezing, etc.)    Negative: Breastfeeding questions about mother's medicines and diet    Negative: MORE THAN A DOUBLE DOSE of a prescription or over-the-counter (OTC) drug    Negative: DOUBLE DOSE (an extra dose or lesser amount) of over-the-counter (OTC) drug and any symptoms (e.g., dizziness, nausea, pain, sleepiness)    Negative: DOUBLE DOSE (an extra dose or lesser amount) of prescription drug and any symptoms (e.g., dizziness, nausea, pain, sleepiness)    Negative: Took another person's prescription drug    Negative: DOUBLE DOSE (an extra dose or lesser amount) of prescription drug and NO symptoms (Exception: a double dose of antibiotics)    Negative: Diabetes drug error or overdose (e.g., took wrong type of insulin or took extra dose)    Negative: Caller has medication question about med not prescribed by PCP and triager unable to answer question (e.g., compatibility with other med, storage)    Negative: Request for URGENT new prescription or refill of 'essential' medication (i.e., likelihood of harm to patient if not taken) and triager unable to fill per department policy    Negative: Prescription not at pharmacy and was prescribed today by PCP    Negative: Pharmacy  calling with prescription questions and triager unable to answer question    Negative: Caller has URGENT medication question about med that PCP prescribed and triager unable to answer question    Negative: Caller has medication question, adult has minor symptoms, caller declines triage, and triager answers question    Negative: Caller has medication question only, adult not sick, and triager answers question    Negative: DOUBLE DOSE (an extra dose or lesser amount) of antibiotic drug and NO symptoms    Negative: DOUBLE DOSE (an extra dose or lesser amount) of over-the-counter (OTC) drug and NO symptoms    Negative: Caller has NON-URGENT medication question about med that PCP prescribed and triager unable to answer question    Negative: Caller requesting a NON-URGENT new prescription or refill and triager unable to refill per department policy    Protocols used: MEDICATION QUESTION CALL-A-[place]

## 2021-06-14 NOTE — TELEPHONE ENCOUNTER
[facility] reference number:  [id number]    Refill requested for synthroid.   Refill Request  Did you contact pharmacy: Yes  Medication name:   Requested Prescriptions     Pending Prescriptions Disp Refills     levothyroxine (SYNTHROID, LEVOTHROID) 75 MCG tablet 90 tablet 2     Sig: Take 1 tablet (75 mcg total) by mouth daily.     Who prescribed the medication: Nadya Villalpando CNP    Requested Pharmacy: [facility]  Is patient out of medication: No.  3 days left  Patient notified refills processed in 3 business days:  yes  Okay to leave a detailed message: yes    Patient is changing to a mail order pharmacy.

## 2021-06-16 NOTE — PROGRESS NOTES
S: The patient is here in follow up of irregular bleeding.  See note from 8/25/2020.  She still has the Mirena in place and is using the progestin only OCPs.  She was doing well for a while, but now she is noting spotting almost every day for the last 3 months or so.  She is also noting an increase in PMS symptoms, in particular breast tenderness and mood changes.  She also has had an increase in cramping, but that happens throughout the cycle.  The bleeding does increase a little around the time of the PMS symptoms, consistent of what would be a menstrual cycle.  She has a history of really bad periods, so she would like to avoid having them if at all possible.  She is working on her anxiety and depression issues.  She was seen at [facility] and diagnosed with a central sensitization disorder with her chronic fatigue and fibromyalgia.  They recommended more aerobic exercise and a structured lifestyle with regular sleep patterns.  They also recommended working with a cognitive behavioral therapist.  She is getting a puppy later this week that she plans on getting trained to be a therapy dog for her.  Her workplace hasn't been helpful with this or work alternatives from what she reports.  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    Outpatient Medications Prior to Visit   Medication Sig Dispense Refill     acetaminophen (TYLENOL) 500 MG tablet Take 1,000 mg by mouth every 6 (six) hours as needed for pain.       albuterol (PROAIR HFA;PROVENTIL HFA;VENTOLIN HFA) 90 mcg/actuation inhaler Inhale 2 puffs every 6 (six) hours as needed for shortness of breath. 1 each 0     amitriptyline (ELAVIL) 10 MG tablet Take 10 mg by mouth at bedtime.       fluticasone propionate (FLONASE) 50 mcg/actuation nasal spray 1 spray into each nostril.       gabapentin (NEURONTIN) 300 MG capsule Take 1 capsule (300 mg total) by  "mouth at bedtime. 90 capsule 3     levonorgestrel (MIRENA) 20 mcg/24 hours (5 yrs) 52 mg IUD 1 each by Intrauterine route once.       levothyroxine (SYNTHROID, LEVOTHROID) 75 MCG tablet Take 1 tablet (75 mcg total) by mouth daily. 90 tablet 2     norethindrone (MICRONOR) 0.35 mg tablet Take 1 tablet (0.35 mg total) by mouth daily. 84 tablet 4     sertraline (ZOLOFT) 50 MG tablet Take 1 tablet (50 mg total) by mouth daily. 90 tablet 2     No facility-administered medications prior to visit.        Patient is allergic to motrin [ibuprofen].    O:  /68 (Patient Site: Right Arm, Patient Position: Sitting, Cuff Size: Adult Regular)   Pulse 81   Ht 5' 5\" (1.651 m)   Wt 189 lb (85.7 kg)           Body mass index is 31.45 kg/m .    General: obese WF, NAD    Assessment: 35 y.o. SWF P0 with a history of dysmenorrhea and menorrhagia.    Plan:  Long discussion with the patient.  Options of trying some low dose estrogen during her PMS symptoms versus having a withdrawal bleed discussed with her.  As she really wants to avoid having a period, she opted for the low dose estrogen.  Prescription for Estrace 1 mg was sent in for her.  She will take this for a week, starting with the PMS changes.  She will let me know how she does.  We discussed endometrial ablation as a possibility as well.  We also discussed hysterectomy; we specifically discussed that even though that would help her bleeding, she would still likely have the PMS symptoms as it wouldn't be recommended to remove her ovaries at this age.  We discussed risks of premature menopause with long term health.  We discussed how to get more regular exercise in, especially with getting a dog.  I recommended a combination of cardio and strength training.  We discussed with having a dog, she should try to get a good walk in every day for her benefit as well as the dog's.  Questions were answered to the best of my ability.    55 minutes spent on the date of the encounter " doing chart review, review of test results, patient visit and documentation

## 2021-06-17 NOTE — PATIENT INSTRUCTIONS - HE
Patient Instructions by Carmen Grijalva CNP at 6/10/2019 12:00 PM     Author: Carmen Grijalva CNP Service: -- Author Type: Nurse Practitioner    Filed: 6/10/2019 12:35 PM Encounter Date: 6/10/2019 Status: Signed    : Carmen Grijalva CNP (Nurse Practitioner)       Please go to the hospital if your eye itself starts hurting or there is drainage from the eye or vision change.      Cool packs to the eye.          Patient Education     Periorbital Cellulitis  Periorbital cellulitis is an infection of the tissues around the eye. It is most often caused by an infected scratch or insect bite. Sometimes a sinus infection can cause this problem.  Home care  The following are general care guidelines:  1. Take your antibiotic medicine exactly as directed, until it is finished.  2. You may use over-the-counter medicine as directed based on age and weight to help with pain and fever, unless another pain medicine was given. If you have liver disease or ever had a stomach ulcer, talk with your healthcare provider before using these medicines. Do not use ibuprofen in children under 6 months of age. Aspirin should never be used in anyone under 18 years of age who is ill with a fever. It may cause severe illness or death.  Follow-up care  Follow up with your healthcare provider, or as advised.  When to seek medical advice  Call your healthcare provider right away if any of these occur:    Increasing swelling or pain around the eye    Increasing redness    Changes in vision    Fever of 100.4 (38  C) oral or 101.5 (38.6  C) rectal for more than 2 days on antibiotics  Date Last Reviewed: 6/1/2016 2000-2017 [employer]. [address] [place] [address], [place] [address]. All rights reserved. This information is not intended as a substitute for professional medical care. Always follow your healthcare professional's instructions.

## 2021-06-18 NOTE — PATIENT INSTRUCTIONS - HE
Patient Instructions by Bulmaro Bradley MD at 9/4/2020  3:50 PM     Author: Bulmaro Bradley MD Service: -- Author Type: Physician    Filed: 9/4/2020  5:58 PM Encounter Date: 9/4/2020 Status: Addendum    : Bulmaro Bradley MD (Physician)    Related Notes: Original Note by Bulmaro Bradley MD (Physician) filed at 9/4/2020  5:57 PM       -No corneal abrasions or foreign bodies were identified on examination today.  -No definite signs of iritis were noted on examination today.  -Discontinue Polytrim drops.  -Apply erythromycin ointment as directed.  -Use warm compresses as needed for mattering.  -Use cool compresses as needed for irritation / itching.  -Recommend recheck within 24-48 hours if symptoms do not improve or actually worsen.  Patient Education     Conjunctivitis, Nonspecific    The membrane that covers the white part of your eye (the conjunctiva) is inflamed. Inflammation happens when your body responds to an injury, allergic reaction, infection, or illness. Symptoms of inflammation in the eye may include redness, irritation, itching, swelling, or burning. These symptoms should go away within the next 24 hours. Conjunctivitis may be related to a particle that was in your eye. If so, it may wash out with your tears or irrigation treatment. Being exposed to liquid chemicals or fumes may also cause this reaction.   Home care    Apply a cold pack over the eye for 20 minutes at a time. This will reduce pain. To make a cold pack, put ice cubes in a plastic bag that seals at the top. Wrap the bag in a clean, thin towel or cloth.    Artificial tears may be prescribed to reduce irritation or redness.  These should be used 3 to 4 times a day.    You may use acetaminophen or ibuprofen to control pain, unless another medicine was prescribed. (Note: If you have chronic liver or kidney disease, or if you have ever had a stomach ulcer or gastrointestinal bleeding, talk with your healthcare provider  before using these medicines.)  Follow-up care  Follow up with your healthcare provider, or as advised.  When to seek medical advice  Call your healthcare provider right away if any of these occur:    Increased eyelid swelling    Increased eye pain    Increased redness or drainage from the eye    Increased blurry vision or increased sensitivity to light    Failure of normal vision to return within 24 to 48 hours  Date Last Reviewed: 7/1/2017 2000-2017 [employer] [address] [place] [address], [place] [address]. All rights reserved. This information is not intended as a substitute for professional medical care. Always follow your healthcare professional's instructions.

## 2021-06-18 NOTE — PATIENT INSTRUCTIONS - HE
Patient Instructions by Emilia Gurrola PT at 1/30/2020  8:00 AM     Author: Emilia Gurrola PT Service: -- Author Type: Physical Therapist    Filed: 1/30/2020  9:02 AM Encounter Date: 1/30/2020 Status: Signed    : Emilia Gurrola PT (Physical Therapist)        SIDELYING TRUNK ROTATION    While lying on your side with your arms out-stretched in front of your body, slowly twist your upper body to the side and rotated your spine. Your arms and head should also be rotating along with the spine as shown. Follow your hand with your eyes.do 10-15 reps each way   1-2 times per day           PELVIC TILT    While lying on your back, use your stomach muscles to press your spine downwards towards the ground. Do not move into a painful position.    Hold 5 sec and do 10-15 reps   1- 2 times per day       BRACE- KNEE FALL OUT     While lying on your back with both knees bent, stabilize your spine by bracing your abdominal muscles. Hold this contraction as you slowly lower one knee to the side. Your pelvis should not move.     You can place your thumbs on your pelvic bone to get feedback of any movements that occur. If your pelvis moves too much, then next time lower the leg less to maintain good control.     Do 10-15 reps each side, alternating sides

## 2021-06-19 NOTE — LETTER
Letter by Nadya Villalpando CNP at      Author: Nadya Villalpando CNP Service: -- Author Type: --    Filed:  Encounter Date: 3/21/2019 Status: (Other)         Eendelia Betancurberg  [address] [place] [address]             March 21, 2019         Dear Ms. De Jesus,    Below are the results from your recent visit:    Resulted Orders   Thyroid Stimulating Hormone (TSH)   Result Value Ref Range    TSH 2.27 0.30 - 5.00 uIU/mL        Your TSH level looks good. I recommend continuing your current dose.     Please call with questions or contact us using [employer].    Sincerely,        Electronically signed by Nadya Villalpando CNP

## 2021-06-21 NOTE — LETTER
Letter by Nadya Villalpando CNP at      Author: Nadya Villalpando CNP Service: -- Author Type: --    Filed:  Encounter Date: 2021 Status: (Other)       2021        Fred De Jesus   [address]  [place] [address]         RE:  Fred De Jesus  : 1985    To Whom It May Concern:    Fred De Jesus is my patient.  I am familiar with her history and with the functional limitations imposed by her disability. She meets the definition of disability under the Americans with Disabilities Act, the Fair Housing Act, and the Rehabilitation Act of .     Due to mental illness, Fred has certain limitations regarding [social interaction/coping with stress/anxiety, etc.].  In order to help alleviate these difficulties and to enhance their ability to live independently and to fully use and enjoy the dwelling unit you own and/or administer, I am prescribing an emotional support animal that will assist this patient in coping with her disability.    I am familiar with the voluminous professional literature concerning the therapeutic benefits of assistance animals for people with disabilities such as that experienced by her.   Upon request, I will share citations to relevant studies, and would be happy to answer other questions you may have concerning my recommendation this patient have an emotional support animal.  Should you have additional questions, please do not hesitate to contact me.    Sincerely,  Nadya Villalpando      [facility]  [address] [place] [address]  [address]  P:  [phone]  F:  [phone]      Nadya Villalpando CNP

## 2021-06-21 NOTE — LETTER
Letter by Nadya Villalpando CNP at      Author: Nadya Villalpando CNP Service: -- Author Type: --    Filed:  Encounter Date: 2021 Status: (Other)       2021        Fred De Jesus   [address] [facility] [address]         RE:  Fred De Jesus  : 1985    To Whom It May Concern:    Fred De Jesus is my patient.  I am familiar with her history and with the functional limitations imposed by her disability. She meets the definition of disability under the Americans with Disabilities Act, the Fair Housing Act, and the Rehabilitation Act of .     Due to mental illness, Fred has certain limitations regarding [social interaction/coping with stress/anxiety, etc.].  In order to help alleviate these difficulties and to enhance their ability to live independently and to fully use and enjoy the dwelling unit you own and/or administer, I am prescribing an emotional support animal that will assist this patient in coping with her disability.    I am familiar with the voluminous professional literature concerning the therapeutic benefits of assistance animals for people with disabilities such as that experienced by her.   Upon request, I will share citations to relevant studies, and would be happy to answer other questions you may have concerning my recommendation this patient have an emotional support animal.  Should you have additional questions, please do not hesitate to contact me.    Sincerely,  Nadya Villalpando      [address] [address]  P: [phone]        Nadya Villalpando CNP

## 2021-06-25 NOTE — PROGRESS NOTES
Internal Medicine Office Visit  [facility]  Patient Name: Enedelia De Jesus  Patient Age: 33 y.o.  YOB: 1985  MRN: [id number]    Date of Visit: 3/20/2019  Reason for Office Visit:   Chief Complaint   Patient presents with     Menstrual Problem     Irregular periods and painful cramping. Ongoing since she was 17. Has been having regular periods, but are very painful. Has not been seen by OBGYN. Was always given BC to regulate by past PCPs.            Assessment / Plan / Medical Decision Makin. Irregular periods  2. Menstrual pain  Recommend utilization of over-the-counter pain reliever as needed for menstrual discomfort she simply apply some heat to the lower pelvis 20 minutes increments  - Ambulatory referral to Gynecology    3. Hypothyroidism, unspecified type  - Thyroid Stimulating Hormone (TSH)    4. Depression, unspecified depression type  Discussion was had with patient about her fatigue this certainly may be attributed to by her depression she is interested in starting a medication she will be started on sertraline 25 mg daily for 1 week then 50 mg daily thereafter.  Recommend follow-up in 4 weeks, sooner if needed.  - sertraline (ZOLOFT) 50 MG tablet; 1/2 tablet daily for 1 week then 1 tablet daily thereafter  Dispense: 30 tablet; Refill: 0    Patient advised if symptoms persist, worsen or new symptoms arise they are to seek medical care.  All patients questions addressed. Patient verbalized understanding and agreement with plan.       Orders Placed This Encounter   Procedures     Thyroid Stimulating Hormone (TSH)     Ambulatory referral to Gynecology   Followup: Return in about 4 weeks (around 2019). earlier if needed.    Health Maintenance Review  Health Maintenance   Topic Date Due     DEPRESSION FOLLOW UP  1985     TD 18+ HE  2002     TDAP ADULT ONE TIME DOSE  2002     ADVANCE DIRECTIVES DISCUSSED WITH PATIENT  2002      PAP SMEAR  11/19/2014     INFLUENZA VACCINE RULE BASED (1) 08/01/2018         I am having Enedelia De Jesus start on sertraline. I am also having her maintain her levothyroxine.      HPI:  Enedelia De Jesus is a 33 y.o. year old who presents to the office today     Cramps and irregular periods since the age of 17.  Patient had previous been taking oral contraceptives nad then devleopes estrogen only due to migrains.  Period every 28 days for period of time and now Periods have now become irregular.  She is having spotting at this point about 3 weeks a month.  She is currently using a menstrual cup.  Cramps are starting the week before and during menstratiion she describes as debilitating. She hs been using Tylenol without relief and the pain has gotten worse to the point that she is fearing her periods and does need to take time off of work during menstruation.   She describes left lower pelvic pain and right side pain.        Increase in fatigue.  She has a dx of hypothyroidism and has not had a TSH in greater than 1 year.     Reports a gluten allergy.    Patient reports little interest or pleasure in doing things several days in addition to feeling down or depressed half days having difficulty with concentration.  Patient denies any homicidal or suicidal thoughts.  Her PHQ 9 score today is 13    Review of Systems- pertinent positive in bold:  Constitutional: Fever, chills, night sweats, fainting, weight change, fatigue, dizziness, sleeping difficulties, loud snoring/pauses in breathing  Eyes: change in vision, blurred or double vision, redness/eye pain  Ears, nose, mouth, throat: change in hearing, ear pain, hoarseness, difficulty swallowing, sores in the mouth or throat  Respiratory: shortness of breath, cough, bloody sputum, wheezing  Cardiovascular: chest pain, palpitations   Gastrointestinal: abdominal pain, heartburn/indigestion, nausea/vomiting, change in appetite, change in bowel habits, constipation or  "diarrhea, rectal bleeding/dark stools, difficulty swallowing  Urinary: painful urination, frequent urination, urinary urgency/incontinence, blood in urine/dark urine, nocturia (new or increasing), muscle cramps, swelling of hands, feet, ankles, leg pain/redness  Skin: change in moles/freckles, rash, nodules  Hematologic/lymphatic: swollen lymph glands, abnormal bruising/bleeding  Endocrine: excessive thirst/urination, cold or heat intolerance  Neurologic/emotional: worrisome memory change, numbness/tingling, anxiety, mood swings      Current Scheduled Meds:  Outpatient Encounter Medications as of 3/20/2019   Medication Sig Dispense Refill     levothyroxine (SYNTHROID, LEVOTHROID) 75 MCG tablet Take 1 tablet by mouth daily.  3     sertraline (ZOLOFT) 50 MG tablet 1/2 tablet daily for 1 week then 1 tablet daily thereafter 30 tablet 0     No facility-administered encounter medications on file as of 3/20/2019.      Past Medical History:   Diagnosis Date     Fatigue      Hypothyroid      Migraine      No past surgical history on file.  Social History     Tobacco Use     Smoking status: Never Smoker     Smokeless tobacco: Never Used   Substance Use Topics     Alcohol use: Not on file     Drug use: Not on file       Objective / Physical Examination:  Vitals:    03/20/19 1829   BP: 100/60   Pulse: 70   SpO2: 100%   Weight: 173 lb (78.5 kg)   Height: 5' 5\" (1.651 m)     Wt Readings from Last 3 Encounters:   03/20/19 173 lb (78.5 kg)     Body mass index is 28.79 kg/m .     General Appearance: Alert and oriented, cooperative, affect appropriate, speech clear, in no apparent distress  Head: Normocephalic, atraumatic  Ears: Tympanic membrane clear with landmarks well visualized bilaterally  Eyes: PERRL,  Conjunctivae clear and sclerae non-icteric  Nose: Septum midline, nares patent,  mucosa moist and without drainage  Throat: Lips and mucosa moist. Teeth in good repair, pharynx without erythema or exudate  Neck: Supple, trachea " midline. No cervical adenopathy  Lungs: Clear to auscultation bilaterally. No adventitious lung sounds noted. Normal inspiratory and expiratory effort  Cardiovascular: Regular rate, normal S1, S2. No murmurs, rubs, or gallops  Abdomen: Bowel sounds active all four quadrants. Soft, non-tender. No hepatomegaly or splenomegaly.   Extremities: Pulses 2+ and equal throughout. No edema. Strength equal throughout.  Integumentary: Warm and dry. Without suspicious looking lesions  Neuro: Alert and oriented, follows commands appropriately.     Little interest or pleasure in doing things: Several days  Feeling down, depressed, or hopeless: More than half the days  Trouble falling or staying asleep, or sleeping too much: Nearly every day  Feeling tired or having little energy: Nearly every day  Poor appetite or overeating: Several days  Feeling bad about yourself - or that you are a failure or have let yourself or your family down: More than half the days  Trouble concentrating on things, such as reading the newspaper or watching television: Several days  Moving or speaking so slowly that other people could have noticed. Or the opposite - being so fidgety or restless that you have been moving around a lot more than usual: Not at all  Thoughts that you would be better off dead, or of hurting yourself in some way: Not at all  PHQ-9 Total Score: 13  If you checked off any problems, how difficult have these problems made it for you to do your work, take care of things at home, or get along with other people?: Very difficult  Depression Follow-up Plan: patient follow-up to return when and if necessary         Nadya Villalpando, CNP

## 2021-06-27 NOTE — PROGRESS NOTES
Progress Notes by Carmen Grijalva CNP at 6/10/2019 12:00 PM     Author: Carmen Grijalva CNP Service: -- Author Type: Nurse Practitioner    Filed: 6/13/2019  9:59 AM Encounter Date: 6/10/2019 Status: Signed    : Carmen Grijalva CNP (Nurse Practitioner)       Chief Complaint   Patient presents with   ? Swollen eyelid     x1d, getting progressively worse, R eye       ASSESSMENT & PLAN:   Diagnoses and all orders for this visit:    Periorbital cellulitis of right eye  -     amoxicillin-clavulanate (AUGMENTIN) 875-125 mg per tablet; Take 1 tablet by mouth 2 (two) times a day for 7 days.  Dispense: 14 tablet; Refill: 0        MDM:  Differential includes insect bite, periorbital cellulitis, conjunctivitis w eyelid involvement, blepharitis, chalazion or hordeolum, orbital infection.  She has no symptoms of conjunctival or orbital infection.  We will treat this as a cellulitis as she does not have any itching to the swollen area and is itchy over other bug bites that she received around the same time.  Patient could have been bitten by a bug and contracted a secondary cellulitis.    Patient informed that it would be an emergency if her eye itself starts hurting to go to the hospital.  Call primary care provider return with discharge from the eye or if no improvement in around 3 days of antibiotics.    Cool packs to eye.    Supportive care discussed.  See discharge instructions below for specific recommendations given.    At the end of the encounter, I discussed results, diagnosis, medications. Discussed red flags for immediate return to clinic/ER, as well as indications for follow up if no improvement. Patient and/or caregiver understood and agreed to plan. Patient was stable for discharge.    SUBJECTIVE    HPI:  HPI  Enedelia Meche De Jesus presents to the walk-in clinic with rt eyelid swelling for 1 day standing somewhat above the eyelid.  Was in the garden and got bit by several mosquitoes with itching to her arms.   Denies itching over her eye area.  Denies eye pain, visual change, discharge.    History obtained from the patient.    Past Medical History:   Diagnosis Date   ? Fatigue    ? Hypothyroid    ? Migraine        There are no active non-hospital problems to display for this patient.        Social History     Tobacco Use   ? Smoking status: Never Smoker   ? Smokeless tobacco: Never Used   Substance Use Topics   ? Alcohol use: Not on file       Review of Systems   Constitutional: Negative for chills and fever.   HENT: Negative for congestion.    Respiratory: Negative for cough.    All other systems reviewed and are negative.      OBJECTIVE    Vitals:    06/10/19 1212   BP: 105/66   Patient Site: Right Arm   Patient Position: Sitting   Cuff Size: Adult Regular   Pulse: 65   Resp: 16   Temp: 98.3  F (36.8  C)   TempSrc: Oral   SpO2: 99%   Weight: 172 lb 5 oz (78.2 kg)       Physical Exam   Constitutional: She is oriented to person, place, and time. She appears well-developed and well-nourished. No distress.   HENT:   Right Ear: External ear normal.   Left Ear: External ear normal.   Eyes: Pupils are equal, round, and reactive to light. Conjunctivae and EOM are normal. Right eye exhibits no discharge, no exudate and no hordeolum. Left eye exhibits no discharge and no exudate. No scleral icterus.   Swollen, erythematous right eyelid with erythema extending superiorly towards eyebrow.  Please see photo.   Cardiovascular: Intact distal pulses.   Pulmonary/Chest: Effort normal.   Musculoskeletal: Normal range of motion.   Neurological: She is alert and oriented to person, place, and time.   Skin: Skin is warm and dry. Capillary refill takes less than 2 seconds.   Psychiatric: She has a normal mood and affect. Her behavior is normal. Judgment and thought content normal.           Labs:  No results found for this or any previous visit (from the past 240 hour(s)).      Radiology:    No results found.    PATIENT INSTRUCTIONS:    Patient Instructions   Please go to the hospital if your eye itself starts hurting or there is drainage from the eye or vision change.      Cool packs to the eye.          Patient Education     Periorbital Cellulitis  Periorbital cellulitis is an infection of the tissues around the eye. It is most often caused by an infected scratch or insect bite. Sometimes a sinus infection can cause this problem.  Home care  The following are general care guidelines:  1. Take your antibiotic medicine exactly as directed, until it is finished.  2. You may use over-the-counter medicine as directed based on age and weight to help with pain and fever, unless another pain medicine was given. If you have liver disease or ever had a stomach ulcer, talk with your healthcare provider before using these medicines. Do not use ibuprofen in children under 6 months of age. Aspirin should never be used in anyone under 18 years of age who is ill with a fever. It may cause severe illness or death.  Follow-up care  Follow up with your healthcare provider, or as advised.  When to seek medical advice  Call your healthcare provider right away if any of these occur:    Increasing swelling or pain around the eye    Increasing redness    Changes in vision    Fever of 100.4 (38  C) oral or 101.5 (38.6  C) rectal for more than 2 days on antibiotics  Date Last Reviewed: 6/1/2016 2000-2017 [employer]. [address] [place] [address], [place] [address]. All rights reserved. This information is not intended as a substitute for professional medical care. Always follow your healthcare professional's instructions.

## 2021-06-30 NOTE — PROGRESS NOTES
Progress Notes by Nadya Villalpando CNP at 2021  8:40 AM     Author: Nadya Villalpando CNP Service: -- Author Type: Nurse Practitioner    Filed: 2021  9:15 AM Encounter Date: 2021 Status: Signed    : Nadya Villalpando CNP (Nurse Practitioner)            [facility]  Patient Name: Fred De Jesus  Patient Age: 35 y.o.  YOB: 1985  MRN: [id number]    Date of Visit: 2021  Reason for Office Visit:   Chief Complaint   Patient presents with   ? Anxiety           Assessment / Plan / Medical Decision Makin. Mild major depression (H)  2. Anxiety  Recommend increasing zoloft to 100mg daily. She is also provided vistaril to be utilized prn for anxiety  - sertraline (ZOLOFT) 100 MG tablet; Take 1 tablet (100 mg total) by mouth daily.  Dispense: 90 tablet; Refill: 1  - hydrOXYzine pamoate (VISTARIL) 25 MG capsule; Take 1 capsule (25 mg total) by mouth 3 (three) times a day as needed for anxiety.  Dispense: 30 capsule; Refill: 0  Recommend a increasing physical activity with a goal of 30 minutes most days of the week outside of regular schedule.  Recommend continued follow up with a mental health provider.  Patient would certainly benefit from a support animal which would aid in reduction of patient's anxiety, encourage physical activity and perhaps help her in increasing socialization and reduction of isolation.     No orders of the defined types were placed in this encounter.  Followup: Return in about 4 weeks (around 2021). earlier if needed.            Health Maintenance Review  Health Maintenance   Topic Date Due   ? DEPRESSION ACTION PLAN  Never done   ? COVID-19 Vaccine (1) Never done   ? ADVANCE CARE PLANNING  Never done   ? PREVENTIVE CARE VISIT  10/18/2020   ? PAP SMEAR  2021 (Originally 2019)   ? TD 18+ HE  10/18/2029   ? HEPATITIS C SCREENING  Completed   ? INFLUENZA VACCINE RULE BASED  Completed   ? TDAP ADULT ONE TIME  "DOSE  Completed   ? Pneumococcal Vaccine: Pediatrics (0 to 5 Years) and At-Risk Patients (6 to 64 Years)  Aged Out   ? HEPATITIS B VACCINES  Aged Out   ? HIV SCREENING  Discontinued         I have changed Enedelia De Jesus's sertraline. I am also having her start on hydrOXYzine pamoate. Additionally, I am having her maintain her levonorgestreL, albuterol, acetaminophen, amitriptyline, gabapentin, norethindrone, levothyroxine, fluticasone propionate, and estradioL.      HPI:  Fred De Jesus is a 35 y.o. year old who presents to the office today with chronic conditions including hypothyroidism, back pain, positive LETICIA, depression, migraines, fibromyalgia.    Central centralization syndrome and feels the chronic fatigue has been difficult. She has a new job 3.20.21 and was working from home.  She has a new manager and reports she it has been difficult and is feeling anxious and is currently seeing therapy.  She states she is having \"anxiety attacks\".  After speaking with therapy, recommendation for a therapy animal.  Patient states she was not going out.    She reports her job humiliated her and made her feel small and felt they made a mockery of her mental health concerns.  She went to  and found that she was feeling retaliated again.  She had been advised by [facility] that given her mental health her employer would accommodate her and they declined to work with her.     She is starting a new job on 4.26.21 and will be in the office full time.  She is requesting a letter to obtain a therapy animal to help patient through her anxiety and it hopeful this will integrate her into becoming more social and leave the home.       Review of Systems- see HPI     Current Scheduled Meds:  Outpatient Encounter Medications as of 4/16/2021   Medication Sig Dispense Refill   ? acetaminophen (TYLENOL) 500 MG tablet Take 1,000 mg by mouth every 6 (six) hours as needed for pain.     ? albuterol (PROAIR HFA;PROVENTIL HFA;VENTOLIN " HFA) 90 mcg/actuation inhaler Inhale 2 puffs every 6 (six) hours as needed for shortness of breath. 1 each 0   ? amitriptyline (ELAVIL) 10 MG tablet Take 10 mg by mouth at bedtime.     ? estradioL (ESTRACE) 1 MG tablet Take 1 tablet (1 mg total) by mouth daily. Start with PMS symptoms and take for 7 days 21 tablet 5   ? fluticasone propionate (FLONASE) 50 mcg/actuation nasal spray 1 spray into each nostril.     ? gabapentin (NEURONTIN) 300 MG capsule Take 1 capsule (300 mg total) by mouth at bedtime. 90 capsule 3   ? levonorgestrel (MIRENA) 20 mcg/24 hours (5 yrs) 52 mg IUD 1 each by Intrauterine route once.     ? levothyroxine (SYNTHROID, LEVOTHROID) 75 MCG tablet Take 1 tablet (75 mcg total) by mouth daily. 90 tablet 2   ? norethindrone (MICRONOR) 0.35 mg tablet Take 1 tablet (0.35 mg total) by mouth daily. 84 tablet 4   ? sertraline (ZOLOFT) 100 MG tablet Take 1 tablet (100 mg total) by mouth daily. 90 tablet 1   ? [DISCONTINUED] sertraline (ZOLOFT) 50 MG tablet Take 1 tablet (50 mg total) by mouth daily. 90 tablet 2   ? hydrOXYzine pamoate (VISTARIL) 25 MG capsule Take 1 capsule (25 mg total) by mouth 3 (three) times a day as needed for anxiety. 30 capsule 0     No facility-administered encounter medications on file as of 4/16/2021.      Past Medical History:   Diagnosis Date   ? Depression    ? Fatigue    ? Hypothyroid    ? Migraine      History reviewed. No pertinent surgical history.  Social History     Tobacco Use   ? Smoking status: Never Smoker   ? Smokeless tobacco: Never Used   Substance Use Topics   ? Alcohol use: Yes     Comment: rarely   ? Drug use: Never     Family History   Problem Relation Age of Onset   ? Cancer Brother          Tumor  in this thigh   ? Alcohol abuse Brother    ? Depression Brother    ? Drug abuse Brother    ? Early death Brother    ? Snoring Brother    ? Thyroid disease Maternal Grandmother    ? Arthritis Maternal Grandmother    ? Asthma Maternal Grandmother    ? Diabetes  "Maternal Grandmother    ? Arthritis Mother    ? Depression Mother    ? Alcohol abuse Father      Social History     Social History Narrative   ? Not on file       Objective / Physical Examination:  Vitals:    04/16/21 0833   BP: 110/60   Pulse: 72   Resp: 24   Temp: 97.5  F (36.4  C)   SpO2: 100%   Weight: 192 lb (87.1 kg)   Height: 5' 5\" (1.651 m)     Wt Readings from Last 3 Encounters:   04/16/21 192 lb (87.1 kg)   04/08/21 189 lb (85.7 kg)   03/31/21 190 lb (86.2 kg)     Body mass index is 31.95 kg/m .     General Appearance: Alert and oriented, cooperative, affect appropriate, speech clear, in no apparent distress  Head: Normocephalic, atraumatic  Eyes:   Conjunctivae clear and sclerae non-icteric  Lungs: Normal inspiratory and expiratory effort  Neuro: Alert and oriented, follows commands appropriately.     No results found.  No results found for this or any previous visit (from the past 240 hour(s)).  Diagnostics:     Results for orders placed or performed in visit on 03/31/21   ECG 12 lead   Result Value Ref Range    SYSTOLIC BLOOD PRESSURE      DIASTOLIC BLOOD PRESSURE      VENTRICULAR RATE 65 BPM    ATRIAL RATE 65 BPM    P-R INTERVAL 120 ms    QRS DURATION 90 ms    Q-T INTERVAL 428 ms    QTC CALCULATION (BEZET) 445 ms    P Axis 11 degrees    R AXIS 18 degrees    T AXIS 22 degrees    MUSE DIAGNOSIS       Normal sinus rhythm  Normal ECG  No previous ECGs available  Confirmed by JOLENE SHELBY MD LOC: ([id number]) on 3/31/2021 4:49:44 PM         Quality review:     PHQ-2 Total Score: 2 (4/16/2021  9:00 AM)  Depression Follow-up Plan: under care of mental health counselor (9/3/2020  1:00 PM)      PHQ-9 Total Score: 14 (4/16/2021  9:00 AM)        Nadya Villalpando, [facility]  Internal Medicine  [facility] [address]             "

## 2021-06-30 NOTE — PROGRESS NOTES
Progress Notes by Cece Shannon MD at 3/31/2021  9:10 AM     Author: Cece Shannon MD Service: -- Author Type: Physician    Filed: 3/31/2021  9:57 AM Encounter Date: 3/31/2021 Status: Signed    : Cece Shannon MD (Physician)           Thank you, Dr. Villalpando, for asking us to see Fred De Jesus at the [facility].      Assessment/Recommendations   Assessment:    1. Toe discoloration: warm extremities and good pedal pulses.  Likely Raynauds disease as triggered by cold and color change consistent with this.  She has seen Dr Dutton in the past for her varicose veins and could follow up with him for further evaluation.    2.  Chronic fatigue syndrome evaluated at [facility]  3.  Fibromyalgia  4.  Depression  5.  Dyspnea on exertion diagnosed with asthma in the past    Plan:  1. Follow up with Dr Dutton  2. Echocardiogram to exclude structural heart disease  3. Follow up as needed depending on results       History of Present Illness    Ms. Fred De Jesus is a 35 y.o. female with history of chronic fatigue syndrome, fibromyalgia, depression who I am seeing today for initial consultation due to shortness of breath and toe discoloration.  She reports that she was diagnosed with exercise-induced asthma in the past but has side effects from the albuterol inhaler so does not use it.  She has not seen a pulmonologist.  She does not exercise on a daily basis but she does do belly dancing 3-4 times a week.  She notices shortness of breath going up stairs or high intensity activity with wheezing at times.  No chest pain.  She is also noticed that occasionally her toes will turn blue and then when a warm up they turn bright red.  She started noticing this color change in 2015 and the right is worse than the left.  She has not experienced this in her hands.    Twelve-lead EKG 35 3 1/5/2021 normal sinus rhythm at 55 bpm, normal-appearing EKG       Physical Examination  Review of Systems   Vitals:    03/31/21 0902   BP: 110/60   Pulse: 72   Resp: 14     Body mass index is 31.62 kg/m .  Wt Readings from Last 3 Encounters:   03/31/21 190 lb (86.2 kg)   09/04/20 177 lb 4.8 oz (80.4 kg)   08/25/20 179 lb (81.2 kg)       General Appearance:   alert, no apparent distress   HEENT:  no scleral icterus; the mucous membranes are pink and moist                                  Neck: No jvd   Chest: the spine is straight and the chest is symmetric   Lungs:   respirations unlabored; the lungs are clear to auscultation   Cardiovascular:   regular rhythm with normal first and second heart sounds and no murmurs or gallops; carotid, radial, femoral, and posterior tibial pulses are intact and there are no carotid or femoral bruits.   Abdomen:  no organomegaly, masses, bruits, or tenderness; bowel sounds are present   Extremities: no edema, Tips of her toes on right foot are blue, good pedal pulses and foot warm   Skin: no xanthelasma    General: Weight Gain  Eyes: WNL  Ears/Nose/Throat: WNL  Lungs: Cough, Shortness of Breath, Wheezing  Heart: Chest Pain, Shortness of Breath with activity  Stomach: Nausea  Bladder: WNL  Muscle/Joints: WNL  Skin: WNL  Nervous System: Daytime Sleepiness, Dizziness, Loss of Balance  Mental Health: Confusion, Anxiety     Blood: Easy Bruising, Easy Bleeding     Medical History  Surgical History Family History Social History   Past Medical History:   Diagnosis Date     Depression      Fatigue      Hypothyroid      Migraine     No past surgical history on file. Family History   Problem Relation Age of Onset     Cancer Brother          Tumor  in this thigh     Alcohol abuse Brother      Depression Brother      Drug abuse Brother      Early death Brother      Snoring Brother      Thyroid disease Maternal Grandmother      Arthritis Maternal Grandmother      Asthma Maternal Grandmother      Diabetes Maternal Grandmother      Arthritis Mother      Depression Mother      Alcohol  abuse Father     Social History     Socioeconomic History     Marital status: Single     Spouse name: Not on file     Number of children: Not on file     Years of education: Not on file     Highest education level: Not on file   Occupational History     Not on file   Social Needs     Financial resource strain: Not on file     Food insecurity     Worry: Not on file     Inability: Not on file     Transportation needs     Medical: Not on file     Non-medical: Not on file   Tobacco Use     Smoking status: Never Smoker     Smokeless tobacco: Never Used   Substance and Sexual Activity     Alcohol use: Yes     Comment: rarely     Drug use: Not on file     Sexual activity: Not on file   Lifestyle     Physical activity     Days per week: Not on file     Minutes per session: Not on file     Stress: Not on file   Relationships     Social connections     Talks on phone: Not on file     Gets together: Not on file     Attends [employer] service: Not on file     Active member of club or organization: Not on file     Attends meetings of clubs or organizations: Not on file     Relationship status: Not on file     Intimate partner violence     Fear of current or ex partner: Not on file     Emotionally abused: Not on file     Physically abused: Not on file     Forced sexual activity: Not on file   Other Topics Concern     Not on file   Social History Narrative     Not on file          Medications  Allergies   Current Outpatient Medications   Medication Sig Dispense Refill     acetaminophen (TYLENOL) 500 MG tablet Take 1,000 mg by mouth every 6 (six) hours as needed for pain.       albuterol (PROAIR HFA;PROVENTIL HFA;VENTOLIN HFA) 90 mcg/actuation inhaler Inhale 2 puffs every 6 (six) hours as needed for shortness of breath. 1 each 0     amitriptyline (ELAVIL) 10 MG tablet Take 10 mg by mouth at bedtime.       gabapentin (NEURONTIN) 300 MG capsule Take 1 capsule (300 mg total) by mouth at bedtime. 90 capsule 3     levonorgestrel (MIRENA)  20 mcg/24 hours (5 yrs) 52 mg IUD 1 each by Intrauterine route once.       levothyroxine (SYNTHROID, LEVOTHROID) 75 MCG tablet Take 1 tablet (75 mcg total) by mouth daily. 90 tablet 2     norethindrone (MICRONOR) 0.35 mg tablet Take 1 tablet (0.35 mg total) by mouth daily. 84 tablet 4     sertraline (ZOLOFT) 50 MG tablet Take 1 tablet (50 mg total) by mouth daily. 90 tablet 2     No current facility-administered medications for this visit.       Allergies   Allergen Reactions     Motrin [Ibuprofen]          Lab Results    Chemistry/lipid CBC Cardiac Enzymes/BNP/TSH/INR   Lab Results   Component Value Date    CHOL 126 10/18/2019    HDL 47 (L) 10/18/2019    LDLCALC 72 10/18/2019    TRIG 34 10/18/2019    CREATININE 0.79 10/23/2020    BUN 7 (L) 10/18/2019    K 4.0 10/18/2019     10/18/2019     10/18/2019    CO2 28 10/18/2019    Lab Results   Component Value Date    WBC 4.3 10/23/2020    HGB 13.7 10/23/2020    HCT 40.1 10/23/2020    MCV 93 10/23/2020     10/23/2020    Lab Results   Component Value Date    TSH 1.11 09/28/2020

## 2021-07-03 NOTE — ADDENDUM NOTE
Addendum Note by Silvano Villalpando CNP at 1/24/2021  8:55 AM     Author: Silvano Villalpando CNP Service: -- Author Type: Nurse Practitioner    Filed: 1/24/2021  8:55 AM Encounter Date: 12/28/2020 Status: Signed    : Silvano Villalpando CNP (Nurse Practitioner)    Addended by: SILVANO VILLALPANDO on: 1/24/2021 08:55 AM        Modules accepted: Orders

## 2021-08-05 ENCOUNTER — OFFICE VISIT (OUTPATIENT)
Dept: OBGYN | Facility: CLINIC | Age: 36
End: 2021-08-05
Payer: COMMERCIAL

## 2021-08-05 VITALS
HEART RATE: 72 BPM | BODY MASS INDEX: 32.45 KG/M2 | DIASTOLIC BLOOD PRESSURE: 62 MMHG | WEIGHT: 195 LBS | SYSTOLIC BLOOD PRESSURE: 108 MMHG

## 2021-08-05 DIAGNOSIS — N92.1 MENORRHAGIA WITH IRREGULAR CYCLE: Primary | ICD-10-CM

## 2021-08-05 DIAGNOSIS — N94.6 DYSMENORRHEA: ICD-10-CM

## 2021-08-05 PROCEDURE — 99214 OFFICE O/P EST MOD 30 MIN: CPT | Performed by: OBSTETRICS & GYNECOLOGY

## 2021-08-05 RX ORDER — SERTRALINE HYDROCHLORIDE 100 MG/1
TABLET, FILM COATED ORAL
COMMUNITY
Start: 2021-04-21 | End: 2021-10-29

## 2021-08-05 NOTE — PROGRESS NOTES
S: The patient is here in follow up of her periods and pain.  See notes from 4/8/21, 8/25/21, 7/30/19, 4/16/19 and 4/4/19.  She had a Mirena placed on 4/16/19 to see if it would help with her periods and cramps.  It did to some extent, but her symptoms were still present.  She has tried progesterone only OCPs as well as low dose estradiol to see if her symptoms would improve, but they haven't enough and both had side effects that were problematic.  The estrogen caused some increased bleeding.  She has also noted increased anger most of every month.  She has hit the point where her absolute goal is to get rid of her periods so she can stop having pain.  Her sister had an endometrial ablation that didn't go well and ended up with a hysterectomy, so she isn't interested in that.  She does have IBS with some cramping from that as well.    Outpatient Medications Prior to Visit   Medication Sig Dispense Refill     acetaminophen (TYLENOL) 500 MG tablet Take 1,000 mg by mouth       albuterol (PROAIR HFA/PROVENTIL HFA/VENTOLIN HFA) 108 (90 Base) MCG/ACT inhaler Inhale 2 puffs into the lungs       amitriptyline (ELAVIL) 10 MG tablet Take 10 mg by mouth       fluticasone (FLONASE) 50 MCG/ACT spray Spray 1 spray into both nostrils daily       gabapentin (NEURONTIN) 600 MG tablet To start, take ONE-HALF TABLET BY MOUTH at bedtime. If tolerated, take another ONE-HALF tabLET in the MORNING.       levonorgestrel (MIRENA) 20 MCG/24HR IUD        levothyroxine (SYNTHROID/LEVOTHROID) 75 MCG tablet        norethindrone (MICRONOR) 0.35 MG tablet Take 0.35 mg by mouth       sertraline (ZOLOFT) 100 MG tablet Take 1 tablet (100 mg total) by mouth daily.       LORazepam (ATIVAN) 1 MG tablet For MRI scans -- must have someone drive you home afterwards 2 tablet 0     sertraline (ZOLOFT) 50 MG tablet Take 50 mg by mouth       No facility-administered medications prior to visit.       Patient is allergic to ibuprofen sodium.    O:  /62 (BP  Location: Left arm, Patient Position: Sitting, Cuff Size: Adult Regular)   Pulse 72   Wt 88.5 kg (195 lb)           Body mass index is 32.45 kg/m .    General: obese WF, NAD    Assessment: 35 year old SWF P0 with menorrhagia with irregular cycles and dysmenorrhea.    Plan:  We discussed options with hormonal treatment, endometrial ablation, and hysterectomy.  She is at the point where she really wants her uterus removed.  We discussed that she needs to check with her insurance company to make sure it would be covered.  Referral was placed to [facility].  We discussed that the surgery is usually laparoscopic with removal of the uterus and fallopian tubes.  We discussed a roughly 6 week recovery time.  We discussed that she will still have hormonal fluctuations from her ovaries remaining in place, so she may still experience PMS.  She will also still have any cramping that's related to the IBS.  Questions were answered to the best of my ability.    37 minutes spent on the date of the encounter doing chart review, patient visit and documentation

## 2021-08-31 ENCOUNTER — MEDICAL CORRESPONDENCE (OUTPATIENT)
Dept: HEALTH INFORMATION MANAGEMENT | Facility: CLINIC | Age: 36
End: 2021-08-31

## 2021-08-31 ASSESSMENT — ANXIETY QUESTIONNAIRES
5. BEING SO RESTLESS THAT IT IS HARD TO SIT STILL: SEVERAL DAYS
2. NOT BEING ABLE TO STOP OR CONTROL WORRYING: SEVERAL DAYS
7. FEELING AFRAID AS IF SOMETHING AWFUL MIGHT HAPPEN: NOT AT ALL
3. WORRYING TOO MUCH ABOUT DIFFERENT THINGS: MORE THAN HALF THE DAYS
6. BECOMING EASILY ANNOYED OR IRRITABLE: SEVERAL DAYS
GAD7 TOTAL SCORE: 10
7. FEELING AFRAID AS IF SOMETHING AWFUL MIGHT HAPPEN: NOT AT ALL
1. FEELING NERVOUS, ANXIOUS, OR ON EDGE: MORE THAN HALF THE DAYS
8. IF YOU CHECKED OFF ANY PROBLEMS, HOW DIFFICULT HAVE THESE MADE IT FOR YOU TO DO YOUR WORK, TAKE CARE OF THINGS AT HOME, OR GET ALONG WITH OTHER PEOPLE?: SOMEWHAT DIFFICULT
GAD7 TOTAL SCORE: 10
GAD7 TOTAL SCORE: 10
4. TROUBLE RELAXING: NEARLY EVERY DAY

## 2021-08-31 ASSESSMENT — PATIENT HEALTH QUESTIONNAIRE - PHQ9
SUM OF ALL RESPONSES TO PHQ QUESTIONS 1-9: 12
10. IF YOU CHECKED OFF ANY PROBLEMS, HOW DIFFICULT HAVE THESE PROBLEMS MADE IT FOR YOU TO DO YOUR WORK, TAKE CARE OF THINGS AT HOME, OR GET ALONG WITH OTHER PEOPLE: SOMEWHAT DIFFICULT
SUM OF ALL RESPONSES TO PHQ QUESTIONS 1-9: 12
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
## 2021-09-01 ENCOUNTER — OFFICE VISIT (OUTPATIENT)
Dept: INTERNAL MEDICINE | Facility: CLINIC | Age: 36
End: 2021-09-01
Payer: COMMERCIAL

## 2021-09-01 VITALS
HEIGHT: 65 IN | DIASTOLIC BLOOD PRESSURE: 70 MMHG | OXYGEN SATURATION: 98 % | SYSTOLIC BLOOD PRESSURE: 112 MMHG | BODY MASS INDEX: 32.47 KG/M2 | HEART RATE: 82 BPM | WEIGHT: 194.9 LBS | TEMPERATURE: 98.3 F

## 2021-09-01 DIAGNOSIS — R06.2 WHEEZE: ICD-10-CM

## 2021-09-01 DIAGNOSIS — E03.9 HYPOTHYROIDISM, UNSPECIFIED TYPE: ICD-10-CM

## 2021-09-01 DIAGNOSIS — Z13.220 LIPID SCREENING: ICD-10-CM

## 2021-09-01 DIAGNOSIS — M47.26 OSTEOARTHRITIS OF SPINE WITH RADICULOPATHY, LUMBAR REGION: ICD-10-CM

## 2021-09-01 DIAGNOSIS — N64.4 BREAST TENDERNESS IN FEMALE: ICD-10-CM

## 2021-09-01 DIAGNOSIS — Z00.00 ANNUAL PHYSICAL EXAM: ICD-10-CM

## 2021-09-01 DIAGNOSIS — F32.0 MILD MAJOR DEPRESSION (H): ICD-10-CM

## 2021-09-01 DIAGNOSIS — F41.9 ANXIETY: Primary | ICD-10-CM

## 2021-09-01 DIAGNOSIS — Z00.00 ROUTINE HISTORY AND PHYSICAL EXAMINATION OF ADULT: ICD-10-CM

## 2021-09-01 DIAGNOSIS — N76.0 BV (BACTERIAL VAGINOSIS): Primary | ICD-10-CM

## 2021-09-01 DIAGNOSIS — B96.89 BV (BACTERIAL VAGINOSIS): Primary | ICD-10-CM

## 2021-09-01 DIAGNOSIS — N89.8 VAGINAL IRRITATION: ICD-10-CM

## 2021-09-01 DIAGNOSIS — Z12.4 SCREENING FOR MALIGNANT NEOPLASM OF CERVIX: ICD-10-CM

## 2021-09-01 LAB
ALBUMIN SERPL-MCNC: 4.1 G/DL (ref 3.5–5)
ALP SERPL-CCNC: 65 U/L (ref 45–120)
ALT SERPL W P-5'-P-CCNC: 24 U/L (ref 0–45)
ANION GAP SERPL CALCULATED.3IONS-SCNC: 7 MMOL/L (ref 5–18)
AST SERPL W P-5'-P-CCNC: 21 U/L (ref 0–40)
BASOPHILS # BLD AUTO: 0 10E3/UL (ref 0–0.2)
BASOPHILS NFR BLD AUTO: 0 %
BILIRUB SERPL-MCNC: 0.6 MG/DL (ref 0–1)
BUN SERPL-MCNC: 11 MG/DL (ref 8–22)
CALCIUM SERPL-MCNC: 9.3 MG/DL (ref 8.5–10.5)
CHLORIDE BLD-SCNC: 105 MMOL/L (ref 98–107)
CHOLEST SERPL-MCNC: 157 MG/DL
CLUE CELLS: ABNORMAL
CO2 SERPL-SCNC: 29 MMOL/L (ref 22–31)
CREAT SERPL-MCNC: 0.88 MG/DL (ref 0.6–1.1)
EOSINOPHIL # BLD AUTO: 0.1 10E3/UL (ref 0–0.7)
EOSINOPHIL NFR BLD AUTO: 1 %
ERYTHROCYTE [DISTWIDTH] IN BLOOD BY AUTOMATED COUNT: 11.8 % (ref 10–15)
FASTING STATUS PATIENT QL REPORTED: YES
GFR SERPL CREATININE-BSD FRML MDRD: 85 ML/MIN/1.73M2
GLUCOSE BLD-MCNC: 85 MG/DL (ref 70–125)
HCT VFR BLD AUTO: 38.6 % (ref 35–47)
HDLC SERPL-MCNC: 46 MG/DL
HGB BLD-MCNC: 13.6 G/DL (ref 11.7–15.7)
IMM GRANULOCYTES # BLD: 0 10E3/UL
IMM GRANULOCYTES NFR BLD: 0 %
LDLC SERPL CALC-MCNC: 102 MG/DL
LYMPHOCYTES # BLD AUTO: 1.3 10E3/UL (ref 0.8–5.3)
LYMPHOCYTES NFR BLD AUTO: 31 %
MCH RBC QN AUTO: 30.8 PG (ref 26.5–33)
MCHC RBC AUTO-ENTMCNC: 35.2 G/DL (ref 31.5–36.5)
MCV RBC AUTO: 87 FL (ref 78–100)
MONOCYTES # BLD AUTO: 0.4 10E3/UL (ref 0–1.3)
MONOCYTES NFR BLD AUTO: 10 %
NEUTROPHILS # BLD AUTO: 2.5 10E3/UL (ref 1.6–8.3)
NEUTROPHILS NFR BLD AUTO: 58 %
PLATELET # BLD AUTO: 146 10E3/UL (ref 150–450)
POTASSIUM BLD-SCNC: 4.4 MMOL/L (ref 3.5–5)
PROT SERPL-MCNC: 7.8 G/DL (ref 6–8)
RBC # BLD AUTO: 4.42 10E6/UL (ref 3.8–5.2)
SODIUM SERPL-SCNC: 141 MMOL/L (ref 136–145)
TRICHOMONAS, WET PREP: ABNORMAL
TRIGL SERPL-MCNC: 46 MG/DL
TSH SERPL DL<=0.005 MIU/L-ACNC: 2.24 UIU/ML (ref 0.3–5)
WBC # BLD AUTO: 4.3 10E3/UL (ref 4–11)
WBC'S/HIGH POWER FIELD, WET PREP: ABNORMAL
YEAST, WET PREP: ABNORMAL

## 2021-09-01 PROCEDURE — G0123 SCREEN CERV/VAG THIN LAYER: HCPCS | Performed by: NURSE PRACTITIONER

## 2021-09-01 PROCEDURE — 99395 PREV VISIT EST AGE 18-39: CPT | Performed by: NURSE PRACTITIONER

## 2021-09-01 PROCEDURE — 87624 HPV HI-RISK TYP POOLED RSLT: CPT | Performed by: NURSE PRACTITIONER

## 2021-09-01 PROCEDURE — 80050 GENERAL HEALTH PANEL: CPT | Performed by: NURSE PRACTITIONER

## 2021-09-01 PROCEDURE — 80061 LIPID PANEL: CPT | Performed by: NURSE PRACTITIONER

## 2021-09-01 PROCEDURE — 36415 COLL VENOUS BLD VENIPUNCTURE: CPT | Performed by: NURSE PRACTITIONER

## 2021-09-01 PROCEDURE — 87210 SMEAR WET MOUNT SALINE/INK: CPT | Performed by: NURSE PRACTITIONER

## 2021-09-01 RX ORDER — GABAPENTIN 300 MG/1
300 CAPSULE ORAL 2 TIMES DAILY PRN
Qty: 90 CAPSULE | Refills: 1 | Status: SHIPPED | OUTPATIENT
Start: 2021-09-01 | End: 2021-11-29

## 2021-09-01 RX ORDER — METRONIDAZOLE 500 MG/1
500 TABLET ORAL 2 TIMES DAILY
Qty: 14 TABLET | Refills: 0 | Status: SHIPPED | OUTPATIENT
Start: 2021-09-01 | End: 2021-09-08

## 2021-09-01 RX ORDER — GABAPENTIN 300 MG/1
300 CAPSULE ORAL 2 TIMES DAILY PRN
COMMUNITY
Start: 2020-09-10 | End: 2021-09-01

## 2021-09-01 RX ORDER — HYDROXYZINE PAMOATE 25 MG/1
25 CAPSULE ORAL PRN
COMMUNITY
Start: 2021-04-16 | End: 2021-09-01

## 2021-09-01 RX ORDER — HYDROXYZINE PAMOATE 25 MG/1
25 CAPSULE ORAL 3 TIMES DAILY PRN
Qty: 90 CAPSULE | Refills: 1 | Status: SHIPPED | OUTPATIENT
Start: 2021-09-01 | End: 2021-11-10

## 2021-09-01 RX ORDER — ALBUTEROL SULFATE 90 UG/1
2 AEROSOL, METERED RESPIRATORY (INHALATION) EVERY 6 HOURS PRN
Qty: 18 G | Refills: 4 | Status: SHIPPED | OUTPATIENT
Start: 2021-09-01

## 2021-09-01 ASSESSMENT — MIFFLIN-ST. JEOR: SCORE: 1574.94

## 2021-09-01 ASSESSMENT — PATIENT HEALTH QUESTIONNAIRE - PHQ9: SUM OF ALL RESPONSES TO PHQ QUESTIONS 1-9: 12

## 2021-09-01 ASSESSMENT — ANXIETY QUESTIONNAIRES: GAD7 TOTAL SCORE: 10

## 2021-09-01 NOTE — LETTER
My Depression Action Plan  Name: Katiuska De Jesus   Date of Birth 1985  Date: 9/1/2021    My doctor: Nadya Villalpando   My clinic: [address] [address]  [phone]          GREEN    ZONE   Good Control    What it looks like:     Things are going generally well. You have normal ups and downs. You may even feel depressed from time to time, but bad moods usually last less than a day.   What you need to do:  1. Continue to care for yourself (see self care plan)  2. Check your depression survival kit and update it as needed  3. Follow your physician s recommendations including any medication.  4. Do not stop taking medication unless you consult with your physician first.           YELLOW         ZONE Getting Worse    What it looks like:     Depression is starting to interfere with your life.     It may be hard to get out of bed; you may be starting to isolate yourself from others.    Symptoms of depression are starting to last most all day and this has happened for several days.     You may have suicidal thoughts but they are not constant.   What you need to do:     1. Call your care team. Your response to treatment will improve if you keep your care team informed of your progress. Yellow periods are signs an adjustment may need to be made.     2. Continue your self-care.  Just get dressed and ready for the day.  Don't give yourself time to talk yourself out of it.    3. Talk to someone in your support network.    4. Open up your Depression Self-Care Plan/Wellness Kit.           RED    ZONE Medical Alert - Get Help    What it looks like:     Depression is seriously interfering with your life.     You may experience these or other symptoms: You can t get out of bed most days, can t work or engage in other necessary activities, you have trouble taking care of basic hygiene, or basic responsibilities, thoughts of suicide or death  that will not go away, self-injurious behavior.     What you need to do:  1. Call your care team and request a same-day appointment. If they are not available (weekends or after hours) call your local crisis line, emergency room or 911.          Depression Self-Care Plan / Wellness Kit    Many people find that medication and therapy are helpful treatments for managing depression. In addition, making small changes to your everyday life can help to boost your mood and improve your wellbeing. Below are some tips for you to consider. Be sure to talk with your medical provider and/or behavioral health consultant if your symptoms are worsening or not improving.     Sleep   Sleep hygiene  means all of the habits that support good, restful sleep. It includes maintaining a consistent bedtime and wake time, using your bedroom only for sleeping or sex, and keeping the bedroom dark and free of distractions like a computer, smartphone, or television.     Develop a Healthy Routine  Maintain good hygiene. Get out of bed in the morning, make your bed, brush your teeth, take a shower, and get dressed. Don t spend too much time viewing media that makes you feel stressed. Find time to relax each day.    Exercise  Get some form of exercise every day. This will help reduce pain and release endorphins, the  feel good  chemicals in your brain. It can be as simple as just going for a walk or doing some gardening, anything that will get you moving.      Diet  Strive to eat healthy foods, including fruits and vegetables. Drink plenty of water. Avoid excessive sugar, caffeine, alcohol, and other mood-altering substances.     Stay Connected with Others  Stay in touch with friends and family members.    Manage Your Mood  Try deep breathing, massage therapy, biofeedback, or meditation. Take part in fun activities when you can. Try to find something to smile about each day.     Psychotherapy  Be open to working with a therapist if your provider  recommends it.     Medication  Be sure to take your medication as prescribed. Most anti-depressants need to be taken every day. It usually takes several weeks for medications to work. Not all medicines work for all people. It is important to follow-up with your provider to make sure you have a treatment plan that is working for you. Do not stop your medication abruptly without first discussing it with your provider.    Crisis Resources   These hotlines are for both adults and children. They and are open 24 hours a day, 7 days a week unless noted otherwise.      National Suicide Prevention Lifeline   7-339-039-IEZD (1959)      Crisis Text Line    www.crisistextline.org  Text HOME to 642645 from anywhere in the [place], anytime, about any type of crisis. A live, trained crisis counselor will receive the text and respond quickly.      [place] Lifeline for LGBTQ Youth  A national crisis intervention and suicide lifeline for LGBTQ youth under 25. Provides a safe place to talk without judgement. Call 1-366.179.3384; text START to 969879 or visit www.thetrevorproject.org to talk to a trained counselor.      For [facility] crisis numbers, visit the [facility] website at:  https://[place].gov/dhs/people-we-serve/adults/health-care/mental-health/resources/crisis-contacts.jsp

## 2021-09-03 NOTE — PROGRESS NOTES
Ms. De Jesus is here for evaluation of bilateral lower extremity spider veins.  She has small spider veins and describes small varicose veins in both lower extremities.  She describes occasional pain.  She does have pain in the lower extremity that actually comes from the spine.    On examination she has small spider and varicose veins in both lower extremities without chronic venous insufficiency.    I have reassured her that since her symptoms are minimal external compression is all that is advised.  She has verbalized her understanding.  She can follow-up as needed.

## 2021-09-08 LAB
HUMAN PAPILLOMA VIRUS 16 DNA: NEGATIVE
HUMAN PAPILLOMA VIRUS 18 DNA: NEGATIVE
HUMAN PAPILLOMA VIRUS FINAL DIAGNOSIS: NORMAL
HUMAN PAPILLOMA VIRUS OTHER HR: NEGATIVE

## 2021-09-09 LAB
BKR LAB AP GYN ADEQUACY: NORMAL
BKR LAB AP GYN INTERPRETATION: NORMAL
BKR LAB AP HPV REFLEX: NORMAL
BKR LAB AP PREVIOUS ABNORMAL: NORMAL
PATH REPORT.COMMENTS IMP SPEC: NORMAL
PATH REPORT.RELEVANT HX SPEC: NORMAL

## 2021-09-14 ENCOUNTER — ANCILLARY PROCEDURE (OUTPATIENT)
Dept: MAMMOGRAPHY | Facility: CLINIC | Age: 36
End: 2021-09-14
Attending: NURSE PRACTITIONER
Payer: COMMERCIAL

## 2021-09-14 DIAGNOSIS — N64.4 BREAST TENDERNESS IN FEMALE: ICD-10-CM

## 2021-09-14 PROCEDURE — 77066 DX MAMMO INCL CAD BI: CPT

## 2021-09-25 ENCOUNTER — HEALTH MAINTENANCE LETTER (OUTPATIENT)
Age: 36
End: 2021-09-25

## 2021-10-12 NOTE — PROGRESS NOTES
Assessment & Plan   Problem List Items Addressed This Visit     None      Visit Diagnoses     Vaginal irritation    -  Primary    Relevant Orders    NEISSERIA GONORRHOEA PCR    CHLAMYDIA TRACHOMATIS PCR    Wet prep - Clinic Collect    Ob/Gyn Referral    Intrauterine contraceptive device threads lost, initial encounter        Relevant Orders    US Pelvic Complete with Transvaginal    Ob/Gyn Referral    Pain in both knees, unspecified chronicity          Will await lab results and treat accordingly will obtain annual pelvic ultrasound and referral to gynecology.  For patient's bilateral knee pain recommend Tylenol arthritis she certainly may trial some heat or ice at 20-minute agreed symptoms persist recommend follow-up at that time.                 No follow-ups on file.    Nadya Villalpando, CNP  [facility]    Rigo Mcdaniels is a 35 year old who presents for the following health issues   HPI     Vaginal Symptoms  Onset/Duration: 10/05/21 abnormal bleeding and vaginal irritation, with abnormal pain on the Right side   Description:  Vaginal Discharge: creamy with a little yellow   Itching (Pruritis): no  Burning sensation:  no  Odor: no  Accompanying Signs & Symptoms:  Urinary symptoms: no  Abdominal pain: YES- in the middle  Fever: no  History:   Sexually active: YES- did have sex with a new partner 3 weeks ago  New Partner: YES-   Possibility of Pregnancy:  no  Recent antibiotic use: in September had an antibiotic for PH level in vaginal   Previous vaginitis issues: YES  Precipitating or alleviating factors: worse when she sits for long times or walking irritates the area worse  Therapies tried and outcome: none      Musculoskeletal problem/pain  Onset/Duration: 2 days ago  Description  Location: knee bilateral knees, no known injury  Joint Swelling: no  Redness: no  Pain: YES- very severe pain both legs  Warmth: no  Intensity:  severe  Progression of Symptoms:  worsening  Accompanying  "signs and symptoms:   Fevers: no  Numbness/tingling/weakness: no  History  Trauma to the area: no  Recent illness:  no  Previous similar problem: no  Previous evaluation:  no  Precipitating or alleviating factors:  Aggravating factors include: lay down at night, unable to sleep  Therapies tried and outcome: heat, ice, acetaminophen and deep heating rub       Review of Systems   Unremarkable than listed above and below      Objective    /68 (BP Location: Right arm, Patient Position: Sitting, Cuff Size: Adult Large)   Pulse 88   Temp 98.1  F (36.7  C) (Temporal)   Ht 1.651 m (5' 5\")   Wt 89 kg (196 lb 1.6 oz)   SpO2 97%   Breastfeeding No   BMI 32.63 kg/m    Body mass index is 32.63 kg/m .  Physical Exam   GENERAL: healthy, alert and no distress  RESP: Respirations are regular nonlabored   (female): normal female external genitalia, normal urethral meatus, vaginal mucosa, cervix has some irritation and spontaneous bleeding is noted with swipe of the Q-tip she has thick yellow discharge that she has some discomfort in the vaginal canal on the left side though no mass or cyst is noted no sores or lesions wet prep and GC chlamydia are obtained I am unable to visualize the strings from her Mirena  MS: no gross musculoskeletal defects noted, no edema patient has bilateral knee discomfort posterior and prepatellar                Answers for HPI/ROS submitted by the patient on 10/13/2021  If you checked off any problems, how difficult have these problems made it for you to do your work, take care of things at home, or get along with other people?: Somewhat difficult  PHQ9 TOTAL SCORE: 7  INA 7 TOTAL SCORE: 7      "

## 2021-10-13 ENCOUNTER — OFFICE VISIT (OUTPATIENT)
Dept: INTERNAL MEDICINE | Facility: CLINIC | Age: 36
End: 2021-10-13
Payer: COMMERCIAL

## 2021-10-13 VITALS
SYSTOLIC BLOOD PRESSURE: 100 MMHG | DIASTOLIC BLOOD PRESSURE: 68 MMHG | HEART RATE: 88 BPM | WEIGHT: 196.1 LBS | TEMPERATURE: 98.1 F | OXYGEN SATURATION: 97 % | BODY MASS INDEX: 32.67 KG/M2 | HEIGHT: 65 IN

## 2021-10-13 DIAGNOSIS — M25.561 PAIN IN BOTH KNEES, UNSPECIFIED CHRONICITY: ICD-10-CM

## 2021-10-13 DIAGNOSIS — B96.89 BACTERIAL VAGINOSIS: ICD-10-CM

## 2021-10-13 DIAGNOSIS — M25.562 PAIN IN BOTH KNEES, UNSPECIFIED CHRONICITY: ICD-10-CM

## 2021-10-13 DIAGNOSIS — N89.8 VAGINAL IRRITATION: Primary | ICD-10-CM

## 2021-10-13 DIAGNOSIS — B37.31 CANDIDIASIS OF VAGINA: Primary | ICD-10-CM

## 2021-10-13 DIAGNOSIS — Z00.00 ANNUAL PHYSICAL EXAM: ICD-10-CM

## 2021-10-13 DIAGNOSIS — N76.0 BACTERIAL VAGINOSIS: ICD-10-CM

## 2021-10-13 DIAGNOSIS — T83.32XA INTRAUTERINE CONTRACEPTIVE DEVICE THREADS LOST, INITIAL ENCOUNTER: ICD-10-CM

## 2021-10-13 LAB
ALBUMIN UR-MCNC: NEGATIVE MG/DL
APPEARANCE UR: CLEAR
BACTERIA #/AREA URNS HPF: ABNORMAL /HPF
BILIRUB UR QL STRIP: NEGATIVE
CLUE CELLS: ABNORMAL
COLOR UR AUTO: YELLOW
GLUCOSE UR STRIP-MCNC: NEGATIVE MG/DL
HGB UR QL STRIP: ABNORMAL
KETONES UR STRIP-MCNC: NEGATIVE MG/DL
LEUKOCYTE ESTERASE UR QL STRIP: NEGATIVE
NITRATE UR QL: NEGATIVE
PH UR STRIP: 6.5 [PH] (ref 5–8)
RBC #/AREA URNS AUTO: ABNORMAL /HPF
SP GR UR STRIP: <=1.005 (ref 1–1.03)
SQUAMOUS #/AREA URNS AUTO: ABNORMAL /LPF
TRICHOMONAS, WET PREP: ABNORMAL
UROBILINOGEN UR STRIP-ACNC: 0.2 E.U./DL
WBC #/AREA URNS AUTO: ABNORMAL /HPF
WBC'S/HIGH POWER FIELD, WET PREP: ABNORMAL
YEAST, WET PREP: PRESENT

## 2021-10-13 PROCEDURE — 87591 N.GONORRHOEAE DNA AMP PROB: CPT | Performed by: NURSE PRACTITIONER

## 2021-10-13 PROCEDURE — 87491 CHLMYD TRACH DNA AMP PROBE: CPT | Performed by: NURSE PRACTITIONER

## 2021-10-13 PROCEDURE — 99214 OFFICE O/P EST MOD 30 MIN: CPT | Performed by: NURSE PRACTITIONER

## 2021-10-13 PROCEDURE — 87210 SMEAR WET MOUNT SALINE/INK: CPT | Performed by: NURSE PRACTITIONER

## 2021-10-13 PROCEDURE — 81001 URINALYSIS AUTO W/SCOPE: CPT | Performed by: NURSE PRACTITIONER

## 2021-10-13 RX ORDER — METRONIDAZOLE 7.5 MG/G
1 GEL VAGINAL AT BEDTIME
Qty: 70 G | Refills: 0 | Status: SHIPPED | OUTPATIENT
Start: 2021-10-13 | End: 2022-02-11

## 2021-10-13 RX ORDER — FLUCONAZOLE 150 MG/1
TABLET ORAL
Qty: 2 TABLET | Refills: 0 | Status: SHIPPED | OUTPATIENT
Start: 2021-10-13 | End: 2022-02-11

## 2021-10-13 ASSESSMENT — PATIENT HEALTH QUESTIONNAIRE - PHQ9
10. IF YOU CHECKED OFF ANY PROBLEMS, HOW DIFFICULT HAVE THESE PROBLEMS MADE IT FOR YOU TO DO YOUR WORK, TAKE CARE OF THINGS AT HOME, OR GET ALONG WITH OTHER PEOPLE: SOMEWHAT DIFFICULT
SUM OF ALL RESPONSES TO PHQ QUESTIONS 1-9: 7
SUM OF ALL RESPONSES TO PHQ QUESTIONS 1-9: 7

## 2021-10-13 ASSESSMENT — ANXIETY QUESTIONNAIRES
6. BECOMING EASILY ANNOYED OR IRRITABLE: MORE THAN HALF THE DAYS
GAD7 TOTAL SCORE: 7
1. FEELING NERVOUS, ANXIOUS, OR ON EDGE: SEVERAL DAYS
2. NOT BEING ABLE TO STOP OR CONTROL WORRYING: SEVERAL DAYS
8. IF YOU CHECKED OFF ANY PROBLEMS, HOW DIFFICULT HAVE THESE MADE IT FOR YOU TO DO YOUR WORK, TAKE CARE OF THINGS AT HOME, OR GET ALONG WITH OTHER PEOPLE?: SOMEWHAT DIFFICULT
5. BEING SO RESTLESS THAT IT IS HARD TO SIT STILL: NOT AT ALL
7. FEELING AFRAID AS IF SOMETHING AWFUL MIGHT HAPPEN: NOT AT ALL
3. WORRYING TOO MUCH ABOUT DIFFERENT THINGS: SEVERAL DAYS
GAD7 TOTAL SCORE: 7
7. FEELING AFRAID AS IF SOMETHING AWFUL MIGHT HAPPEN: NOT AT ALL
GAD7 TOTAL SCORE: 7
4. TROUBLE RELAXING: MORE THAN HALF THE DAYS

## 2021-10-13 ASSESSMENT — MIFFLIN-ST. JEOR: SCORE: 1580.38

## 2021-10-14 LAB
C TRACH DNA SPEC QL NAA+PROBE: NEGATIVE
N GONORRHOEA DNA SPEC QL NAA+PROBE: NEGATIVE

## 2021-10-14 ASSESSMENT — PATIENT HEALTH QUESTIONNAIRE - PHQ9: SUM OF ALL RESPONSES TO PHQ QUESTIONS 1-9: 7

## 2021-10-14 ASSESSMENT — ANXIETY QUESTIONNAIRES: GAD7 TOTAL SCORE: 7

## 2021-10-29 DIAGNOSIS — F32.0 MILD MAJOR DEPRESSION (H): Primary | ICD-10-CM

## 2021-10-29 DIAGNOSIS — F41.9 ANXIETY: ICD-10-CM

## 2021-10-29 NOTE — TELEPHONE ENCOUNTER
Pending Prescriptions:                       Disp   Refills    sertraline (ZOLOFT) 100 MG tablet

## 2021-10-29 NOTE — TELEPHONE ENCOUNTER
"Routing refill request to provider for review/approval because:  Drug interaction warning    Last Written Prescription Date:  4/16/2021  Last Fill Quantity: 90,  # refills: 1   Last office visit provider:  10/13/2021 Nadya Villalpando NP     sertraline (ZOLOFT) 100 MG tablet 90 tablet 1 4/16/2021  No   Sig - Route: Take 1 tablet (100 mg total) by mouth daily. - Oral   Sent to pharmacy as: sertraline 100 mg tablet (ZOLOFT)   E-Prescribing Status: Receipt confirmed by pharmacy (4/16/2021  8:56 AM CDT       Requested Prescriptions   Pending Prescriptions Disp Refills     sertraline (ZOLOFT) 100 MG tablet 90 tablet 3     Sig: Take 1 tablet (100 mg total) by mouth daily.       SSRIs Protocol Passed - 10/29/2021  7:43 AM        Passed - Recent (12 mo) or future (30 days) visit within the authorizing provider's specialty     Patient has had an office visit with the authorizing provider or a provider within the authorizing providers department within the previous 12 mos or has a future within next 30 days. See \"Patient Info\" tab in inbasket, or \"Choose Columns\" in Meds & Orders section of the refill encounter.              Passed - Medication is active on med list        Passed - Patient is age 18 or older        Passed - No active pregnancy on record        Passed - No positive pregnancy test in last 12 months             Nicolette Mott RN 10/29/21 3:40 PM  "

## 2021-11-01 RX ORDER — SERTRALINE HYDROCHLORIDE 100 MG/1
TABLET, FILM COATED ORAL
Qty: 90 TABLET | Refills: 1 | Status: SHIPPED | OUTPATIENT
Start: 2021-11-01 | End: 2022-04-06

## 2021-11-09 DIAGNOSIS — F41.9 ANXIETY: ICD-10-CM

## 2021-11-09 NOTE — TELEPHONE ENCOUNTER
rec fax refill request hydroxyzine 25mg    RX last filled 10/06/21    Patient last seen 10/13/21    Fax date 11/08/21

## 2021-11-10 RX ORDER — HYDROXYZINE PAMOATE 25 MG/1
25 CAPSULE ORAL 3 TIMES DAILY PRN
Qty: 90 CAPSULE | Refills: 1 | Status: SHIPPED | OUTPATIENT
Start: 2021-11-10 | End: 2022-04-06

## 2021-11-19 DIAGNOSIS — Z00.00 ROUTINE HISTORY AND PHYSICAL EXAMINATION OF ADULT: Primary | ICD-10-CM

## 2021-11-20 ENCOUNTER — HEALTH MAINTENANCE LETTER (OUTPATIENT)
Age: 36
End: 2021-11-20

## 2021-11-20 ENCOUNTER — NURSE TRIAGE (OUTPATIENT)
Dept: NURSING | Facility: CLINIC | Age: 36
End: 2021-11-20
Payer: COMMERCIAL

## 2021-11-20 RX ORDER — NORETHINDRONE 0.35 MG/1
TABLET ORAL
Qty: 84 TABLET | Refills: 3 | Status: SHIPPED | OUTPATIENT
Start: 2021-11-20 | End: 2022-09-22

## 2021-11-26 DIAGNOSIS — M47.26 OSTEOARTHRITIS OF SPINE WITH RADICULOPATHY, LUMBAR REGION: ICD-10-CM

## 2021-11-28 NOTE — TELEPHONE ENCOUNTER
Routing refill request to provider for review/approval because:  Drug not on the G refill protocol     Last Written Prescription Date:  9/1/21  Last Fill Quantity: 90,  # refills: 1   Last office visit provider:  10/13/21     Requested Prescriptions   Pending Prescriptions Disp Refills     gabapentin (NEURONTIN) 300 MG capsule 90 capsule 1     Sig: Take 1 capsule (300 mg) by mouth 2 times daily as needed (pain)       There is no refill protocol information for this order          Diallo De La Rosa RN 11/28/21 8:57 AM

## 2021-11-29 RX ORDER — GABAPENTIN 300 MG/1
300 CAPSULE ORAL 2 TIMES DAILY PRN
Qty: 90 CAPSULE | Refills: 1 | Status: SHIPPED | OUTPATIENT
Start: 2021-11-29 | End: 2022-04-06

## 2021-11-30 DIAGNOSIS — E03.9 HYPOTHYROIDISM, UNSPECIFIED TYPE: Primary | ICD-10-CM

## 2021-12-01 RX ORDER — LEVOTHYROXINE SODIUM 75 UG/1
75 TABLET ORAL DAILY
Qty: 90 TABLET | Refills: 1 | Status: SHIPPED | OUTPATIENT
Start: 2021-12-01 | End: 2022-04-06

## 2021-12-01 NOTE — TELEPHONE ENCOUNTER
"Last Written Prescription Date:  1/29/21  Last Fill Quantity: 90,  # refills: 2   Last office visit provider:  10/13/21     Requested Prescriptions   Pending Prescriptions Disp Refills     levothyroxine (SYNTHROID/LEVOTHROID) 75 MCG tablet 90 tablet 0     Sig: Take 1 tablet (75 mcg) by mouth daily       Thyroid Protocol Passed - 11/30/2021  8:07 AM        Passed - Patient is 12 years or older        Passed - Recent (12 mo) or future (30 days) visit within the authorizing provider's specialty     Patient has had an office visit with the authorizing provider or a provider within the authorizing providers department within the previous 12 mos or has a future within next 30 days. See \"Patient Info\" tab in inbasket, or \"Choose Columns\" in Meds & Orders section of the refill encounter.              Passed - Medication is active on med list        Passed - Normal TSH on file in past 12 months     Recent Labs   Lab Test 09/01/21  0919   TSH 2.24              Passed - No active pregnancy on record     If patient is pregnant or has had a positive pregnancy test, please check TSH.          Passed - No positive pregnancy test in past 12 months     If patient is pregnant or has had a positive pregnancy test, please check TSH.               Marleni Hay RN 12/01/21 2:21 PM  "

## 2022-01-07 ENCOUNTER — IMMUNIZATION (OUTPATIENT)
Dept: NURSING | Facility: CLINIC | Age: 37
End: 2022-01-07
Payer: COMMERCIAL

## 2022-01-07 PROCEDURE — 91305 COVID-19,PF,PFIZER (12+ YRS): CPT

## 2022-01-07 PROCEDURE — 0054A COVID-19,PF,PFIZER (12+ YRS): CPT

## 2022-02-11 ENCOUNTER — ANCILLARY PROCEDURE (OUTPATIENT)
Dept: GENERAL RADIOLOGY | Facility: CLINIC | Age: 37
End: 2022-02-11
Attending: FAMILY MEDICINE
Payer: COMMERCIAL

## 2022-02-11 ENCOUNTER — OFFICE VISIT (OUTPATIENT)
Dept: FAMILY MEDICINE | Facility: CLINIC | Age: 37
End: 2022-02-11
Payer: COMMERCIAL

## 2022-02-11 VITALS
HEART RATE: 80 BPM | WEIGHT: 200 LBS | DIASTOLIC BLOOD PRESSURE: 71 MMHG | RESPIRATION RATE: 14 BRPM | HEIGHT: 65 IN | TEMPERATURE: 97.7 F | BODY MASS INDEX: 33.32 KG/M2 | SYSTOLIC BLOOD PRESSURE: 107 MMHG

## 2022-02-11 DIAGNOSIS — M25.572 PAIN IN JOINT, ANKLE AND FOOT, LEFT: ICD-10-CM

## 2022-02-11 DIAGNOSIS — F32.4 MAJOR DEPRESSIVE DISORDER WITH SINGLE EPISODE, IN PARTIAL REMISSION (H): ICD-10-CM

## 2022-02-11 DIAGNOSIS — I73.00 RAYNAUD'S DISEASE WITHOUT GANGRENE: Primary | ICD-10-CM

## 2022-02-11 DIAGNOSIS — S93.402A SPRAIN OF LEFT ANKLE, UNSPECIFIED LIGAMENT, INITIAL ENCOUNTER: ICD-10-CM

## 2022-02-11 PROBLEM — F32.9 MAJOR DEPRESSION, SINGLE EPISODE: Status: ACTIVE | Noted: 2022-02-11

## 2022-02-11 PROCEDURE — 99214 OFFICE O/P EST MOD 30 MIN: CPT | Mod: 25 | Performed by: FAMILY MEDICINE

## 2022-02-11 PROCEDURE — 90471 IMMUNIZATION ADMIN: CPT | Performed by: FAMILY MEDICINE

## 2022-02-11 PROCEDURE — 90686 IIV4 VACC NO PRSV 0.5 ML IM: CPT | Performed by: FAMILY MEDICINE

## 2022-02-11 PROCEDURE — 73610 X-RAY EXAM OF ANKLE: CPT | Mod: LT | Performed by: RADIOLOGY

## 2022-02-11 RX ORDER — AMLODIPINE BESYLATE 2.5 MG/1
2.5 TABLET ORAL DAILY
Qty: 90 TABLET | Refills: 0 | Status: SHIPPED | OUTPATIENT
Start: 2022-02-11 | End: 2022-06-17

## 2022-02-11 RX ORDER — DICLOFENAC POTASSIUM 50 MG/1
50 TABLET, FILM COATED ORAL 2 TIMES DAILY PRN
Qty: 30 TABLET | Refills: 0 | Status: SHIPPED | OUTPATIENT
Start: 2022-02-11 | End: 2022-04-06

## 2022-02-11 ASSESSMENT — ENCOUNTER SYMPTOMS
JOINT SWELLING: 1
ARTHRALGIAS: 1
MYALGIAS: 1

## 2022-02-11 ASSESSMENT — MIFFLIN-ST. JEOR: SCORE: 1593.07

## 2022-02-11 NOTE — PATIENT INSTRUCTIONS
Patient Education     Raynaud Disease  Your healthcare provider has told you that you have Raynaud disease. It is also called Raynaud phenomenon or Raynaud syndrome. There is no cure for Raynaud disease, but you can manage it to help prevent attacks.    What are the symptoms of Raynaud disease?  A Raynaud disease attack is often triggered by cold or stress. During an attack, blood vessels suddenly narrow (called vasospasm).  This most often happens in fingers and toes. In rare cases, the nose, ears, nipples, or even tongue are affected. Narrowed blood vessels reduce the blood supply to the area. The area then turns white, blue, or red. The area may feel numb or painful. As the attack passes, the blood vessels open. The affected area may turn bright red as it warms up, then returns to normal color.  What is the cause of Raynaud disease?  With Raynaud disease, it is believed that blood vessels in the affected areas overrespond to certain triggers, such as cold. This makes them narrow (called vasospasm) much more than in people without the disease. Experts don t know what causes the blood vessels to react so strongly to certain triggers. In between attacks, the blood vessels are normal and healthy. Attacks don t permanently damage the blood vessels. But may thicken the artery walls.   In some cases, Raynaud disease happens along with another disease or condition. This is often a connective tissue disorder, such as lupus, scleroderma, or rheumatoid arthritis. This is called secondary Raynaud disease (as opposed to primary Raynaud disease discussed above) and may be more severe. If this is the case for you, you and your healthcare provider can discuss treatment for the underlying condition.  What are the risk factors?  Risk factors for Raynaud disease include:    Women are more likely to get Raynaud disease than men.    Younger people are at higher risk, usually ages 15 to 30.    Living in colder climates increases  risk.    Having a family member with Raynaud disease increases your risk.    Underlying rheumatoid conditions may increase your risk.   What are possible triggers?  Triggers for Raynaud disease include:     Cold    Stress    Caffeine    Smoking    Repetitive movements    Certain medicines, such as beta-blockers, migraine medicine, birth control pills and others    Injury  How is Raynaud disease diagnosed?  Your description of your symptoms, a health history, and a physical exam are often enough for a diagnosis. Blood tests and other tests may be done to see if any underlying conditions are present and rule out other problems.  How is Raynaud disease treated?  There is no cure for Raynaud disease. But you can control symptoms and reduce the number and severity of attacks. For most people, avoiding triggers is enough to limit attacks. Your healthcare provider may suggest the following:    Take precautions to help prevent your hands and feet from losing circulation. This includes:  ? Dressing warmly in cold weather.  ? Wearing gloves or mittens when your hands may become cold, such as when you use the refrigerator or freezer.  ? Avoiding stress and caffeine.  ? Exercising regularly. This may reduce the number and severity of attacks.   ? If you smoke, quitting may improve the condition. This is because smoking causes your blood vessels to narrow and reduces blood flow.    Soak your hands or feet in warm (not hot) water. Do this at the first sign of an attack. Keep soaking until your skin color returns to normal.  In some people, symptoms are persistent or troubling. For these cases, other treatments are a choice. Your healthcare provider can tell you more about the following:    Prescription medicines. Some medicines that relax and widen blood vessels, such as calcium channel blockers. These may help relieve symptoms.    Nerve surgery. This is used for severe cases that don t respond to other treatments. Surgery  removes the nerves that surround the blood vessels in the hands and feet. Without nerve stimulation, the blood vessels stay more relaxed. They are less likely to become very narrow due to stimuli. Nerves may be blocked using injections in some cases.  Most cases of Raynaud disease are not cause for concern. The disease doesn t get worse and isn t likely to cause any permanent damage. If attacks are severe, last for a long time, or occur very often, skin damage may result. Controlling attacks can help prevent this.  When to seek medical care  The following problems happen rarely, but they can be serious. Call your healthcare provider right away if you notice any of the following:    Infection or sores on the skin    A finger or toe turns black    The skin breaks open on its own    A rash develops    A finger or toe joint becomes painful or swollen  [employer] last reviewed this educational content on 6/1/2018 2000-2021 [employer]. All rights reserved. This information is not intended as a substitute for professional medical care. Always follow your healthcare professional's instructions.

## 2022-02-11 NOTE — PROGRESS NOTES
Assessment & Plan     Raynaud's disease without gangrene  -discussed treatment options .  Will start amlodipine .  Recommend to contact with any dizziness or medication side effect .  - amLODIPine (NORVASC) 2.5 MG tablet; Take 1 tablet (2.5 mg) by mouth daily    Major depressive disorder with single episode, in partial remission (H)  Recommend to continue Zoloft , will continue to monitor .  Sprain of left ankle, unspecified ligament, initial encounter  - discussed rest and ice .  Xray no acute changes .    - Ankle/Foot Bracing Supplies Order for DME - ONLY FOR DME    Pain in joint, ankle and foot, left    - XR Ankle Left G/E 3 Views; Future  - diclofenac (CATAFLAM) 50 MG tablet; Take 1 tablet (50 mg) by mouth 2 times daily as needed for moderate pain    956}    Return in about 6 months (around 8/11/2022) for Routine preventive, patient will call.    Elvie Saenz MD  [facility] NANCY Mcdaniels is a 36 year old who presents for the following health issues     History of Present Illness       She eats 2-3 servings of fruits and vegetables daily.She consumes 0 sweetened beverage(s) daily.She exercises with enough effort to increase her heart rate 10 to 19 minutes per day.  She exercises with enough effort to increase her heart rate 3 or less days per week.   She is taking medications regularly.       Concern - left ankle  Onset: yesterday  Description: swelling  Intensity: 10/10  Progression of Symptoms:  worsening  Accompanying Signs & Symptoms: tingling  Previous history of similar problem: none  Precipitating factors:        Worsened by: walking  Alleviating factors:        Improved by: ice  Therapies tried and outcome:  ice        Review of Systems   Musculoskeletal: Positive for arthralgias, joint swelling and myalgias.        Ankle pain left , stepped wrong pain ankle left Lateral surface .            Objective    /71 (BP Location: Right arm, Patient Position: Chair, Cuff Size:  "Adult Large)   Pulse 80   Temp 97.7  F (36.5  C) (Oral)   Resp 14   Ht 1.651 m (5' 5\")   Wt 90.7 kg (200 lb)   BMI 33.28 kg/m    Body mass index is 33.28 kg/m .  Physical Exam  Vitals and nursing note reviewed.   HENT:      Head: Normocephalic.   Pulmonary:      Effort: Pulmonary effort is normal.   Abdominal:      General: Abdomen is flat.   Musculoskeletal:         General: Swelling and tenderness present. Normal range of motion.      Left lower leg: Edema present.   Neurological:      Mental Status: She is alert.   Psychiatric:         Mood and Affect: Mood normal.                "

## 2022-03-30 ENCOUNTER — MYC MEDICAL ADVICE (OUTPATIENT)
Dept: INTERNAL MEDICINE | Facility: CLINIC | Age: 37
End: 2022-03-30
Payer: COMMERCIAL

## 2022-04-05 NOTE — PROGRESS NOTES
"  Assessment & Plan   Problem List Items Addressed This Visit        Endocrine    Hypothyroidism    Relevant Medications    levothyroxine (SYNTHROID/LEVOTHROID) 75 MCG tablet       Other    Fatigue    Relevant Orders    Vitamin B12    Magnesium    Iron and iron binding capacity    Ferritin    Vitamin D deficiency screening    UA Macro with Reflex to Micro and Culture - lab collect    TSH with free T4 reflex    Comprehensive metabolic panel    CBC with platelets and differential      Other Visit Diagnoses     LLQ pain    -  Primary    Relevant Orders    CT Abdomen Pelvis w Contrast    CRP, inflammation    Mild major depression (H)        Relevant Medications    sertraline (ZOLOFT) 100 MG tablet    hydrOXYzine (VISTARIL) 25 MG capsule    amitriptyline (ELAVIL) 10 MG tablet    Anxiety        Relevant Medications    sertraline (ZOLOFT) 100 MG tablet    hydrOXYzine (VISTARIL) 25 MG capsule    gabapentin (NEURONTIN) 300 MG capsule    amitriptyline (ELAVIL) 10 MG tablet    Osteoarthritis of spine with radiculopathy, lumbar region        Relevant Medications    sertraline (ZOLOFT) 100 MG tablet    hydrOXYzine (VISTARIL) 25 MG capsule    gabapentin (NEURONTIN) 300 MG capsule    amitriptyline (ELAVIL) 10 MG tablet         Patient will be follow-up post diagnostic testing, sooner if needed.  All patient questions addressed verbalized understanding agree with plan.           BMI:   Estimated body mass index is 33.56 kg/m  as calculated from the following:    Height as of this encounter: 1.651 m (5' 5\").    Weight as of this encounter: 91.5 kg (201 lb 11.2 oz).           No follow-ups on file.    Nadya Villalpando NP  [facility]    Rigo Mcdaniels is a 36 year old who presents for the following health issues     History of Present Illness       Reason for visit:  Pain in abdomen on the left side  Symptom onset:  3-7 days ago  Symptoms include:  Sharp pain then continous pain. I have to push on that area in " "order to utilize that side of my abdomen. It helps especially when im trying ro get out of bed  Symptom intensity:  Severe  Symptom progression:  Improving  Had these symptoms before:  No  What makes it worse:  Using any of my abdominal muscles especially when i lay down and have to move or get out of bed  What makes it better:  Not using that side    She eats 2-3 servings of fruits and vegetables daily.She consumes 0 sweetened beverage(s) daily.She exercises with enough effort to increase her heart rate 10 to 19 minutes per day.  She exercises with enough effort to increase her heart rate 3 or less days per week.   She is taking medications regularly.         Dogs toy went under the bed and stretched since that time has left lower pelvic pain increases with sitting or movement    Patient reports exhaustion and having trouble getting up in the morning.  Patient states she is so fatigued she is wanting to work out though feels exhausted.  Patient reports does not feel this is due to mentally feeling overwhelmed.        Review of Systems   Unremarkable other than listed above and below         Objective    /68 (BP Location: Right arm, Patient Position: Sitting, Cuff Size: Adult Large)   Pulse 88   Resp 16   Ht 1.651 m (5' 5\")   Wt 91.5 kg (201 lb 11.2 oz)   BMI 33.56 kg/m    Body mass index is 33.56 kg/m .  Physical Exam   GENERAL: healthy, alert and no distress  RESP:  respirations are regular nonlabored  ABDOMEN: soft, mild lower left quadrant tenderness in the left pelvis, no hepatosplenomegaly, no masses and bowel sounds normal  MS: no gross musculoskeletal defects noted, no edema  PSYCH: mentation appears normal, affect normal/bright                "

## 2022-04-06 ENCOUNTER — OFFICE VISIT (OUTPATIENT)
Dept: INTERNAL MEDICINE | Facility: CLINIC | Age: 37
End: 2022-04-06
Payer: COMMERCIAL

## 2022-04-06 VITALS
WEIGHT: 201.7 LBS | BODY MASS INDEX: 33.61 KG/M2 | DIASTOLIC BLOOD PRESSURE: 68 MMHG | SYSTOLIC BLOOD PRESSURE: 116 MMHG | HEART RATE: 88 BPM | HEIGHT: 65 IN | RESPIRATION RATE: 16 BRPM

## 2022-04-06 DIAGNOSIS — R10.32 LLQ PAIN: Primary | ICD-10-CM

## 2022-04-06 DIAGNOSIS — M47.26 OSTEOARTHRITIS OF SPINE WITH RADICULOPATHY, LUMBAR REGION: ICD-10-CM

## 2022-04-06 DIAGNOSIS — F41.9 ANXIETY: ICD-10-CM

## 2022-04-06 DIAGNOSIS — F32.0 MILD MAJOR DEPRESSION (H): ICD-10-CM

## 2022-04-06 DIAGNOSIS — E03.9 HYPOTHYROIDISM, UNSPECIFIED TYPE: ICD-10-CM

## 2022-04-06 DIAGNOSIS — R53.83 OTHER FATIGUE: ICD-10-CM

## 2022-04-06 LAB
ALBUMIN SERPL-MCNC: 3.6 G/DL (ref 3.5–5)
ALBUMIN UR-MCNC: NEGATIVE MG/DL
ALP SERPL-CCNC: 63 U/L (ref 45–120)
ALT SERPL W P-5'-P-CCNC: 24 U/L (ref 0–45)
ANION GAP SERPL CALCULATED.3IONS-SCNC: 8 MMOL/L (ref 5–18)
APPEARANCE UR: CLEAR
AST SERPL W P-5'-P-CCNC: 19 U/L (ref 0–40)
BASOPHILS # BLD AUTO: 0 10E3/UL (ref 0–0.2)
BASOPHILS NFR BLD AUTO: 0 %
BILIRUB SERPL-MCNC: 0.3 MG/DL (ref 0–1)
BILIRUB UR QL STRIP: NEGATIVE
BUN SERPL-MCNC: 14 MG/DL (ref 8–22)
C REACTIVE PROTEIN LHE: <0.1 MG/DL (ref 0–0.8)
CALCIUM SERPL-MCNC: 8.5 MG/DL (ref 8.5–10.5)
CHLORIDE BLD-SCNC: 105 MMOL/L (ref 98–107)
CO2 SERPL-SCNC: 24 MMOL/L (ref 22–31)
COLOR UR AUTO: YELLOW
CREAT SERPL-MCNC: 0.77 MG/DL (ref 0.6–1.1)
EOSINOPHIL # BLD AUTO: 0.1 10E3/UL (ref 0–0.7)
EOSINOPHIL NFR BLD AUTO: 2 %
ERYTHROCYTE [DISTWIDTH] IN BLOOD BY AUTOMATED COUNT: 12.1 % (ref 10–15)
FERRITIN SERPL-MCNC: 80 NG/ML (ref 10–130)
GFR SERPL CREATININE-BSD FRML MDRD: >90 ML/MIN/1.73M2
GLUCOSE BLD-MCNC: 81 MG/DL (ref 70–125)
GLUCOSE UR STRIP-MCNC: NEGATIVE MG/DL
HCT VFR BLD AUTO: 36.5 % (ref 35–47)
HGB BLD-MCNC: 12.8 G/DL (ref 11.7–15.7)
HGB UR QL STRIP: NEGATIVE
IMM GRANULOCYTES # BLD: 0 10E3/UL
IMM GRANULOCYTES NFR BLD: 0 %
KETONES UR STRIP-MCNC: NEGATIVE MG/DL
LEUKOCYTE ESTERASE UR QL STRIP: NEGATIVE
LYMPHOCYTES # BLD AUTO: 1.3 10E3/UL (ref 0.8–5.3)
LYMPHOCYTES NFR BLD AUTO: 29 %
MAGNESIUM SERPL-MCNC: 2.1 MG/DL (ref 1.8–2.6)
MCH RBC QN AUTO: 31.6 PG (ref 26.5–33)
MCHC RBC AUTO-ENTMCNC: 35.1 G/DL (ref 31.5–36.5)
MCV RBC AUTO: 90 FL (ref 78–100)
MONOCYTES # BLD AUTO: 0.4 10E3/UL (ref 0–1.3)
MONOCYTES NFR BLD AUTO: 8 %
NEUTROPHILS # BLD AUTO: 2.8 10E3/UL (ref 1.6–8.3)
NEUTROPHILS NFR BLD AUTO: 60 %
NITRATE UR QL: NEGATIVE
PH UR STRIP: 6.5 [PH] (ref 5–8)
PLATELET # BLD AUTO: 152 10E3/UL (ref 150–450)
POTASSIUM BLD-SCNC: 4 MMOL/L (ref 3.5–5)
PROT SERPL-MCNC: 7.4 G/DL (ref 6–8)
RBC # BLD AUTO: 4.05 10E6/UL (ref 3.8–5.2)
SODIUM SERPL-SCNC: 137 MMOL/L (ref 136–145)
SP GR UR STRIP: 1.01 (ref 1–1.03)
TSH SERPL DL<=0.005 MIU/L-ACNC: 1.92 UIU/ML (ref 0.3–5)
UROBILINOGEN UR STRIP-ACNC: 0.2 E.U./DL
VIT B12 SERPL-MCNC: 553 PG/ML (ref 213–816)
WBC # BLD AUTO: 4.7 10E3/UL (ref 4–11)

## 2022-04-06 PROCEDURE — 82607 VITAMIN B-12: CPT | Performed by: NURSE PRACTITIONER

## 2022-04-06 PROCEDURE — 81003 URINALYSIS AUTO W/O SCOPE: CPT | Performed by: NURSE PRACTITIONER

## 2022-04-06 PROCEDURE — 99214 OFFICE O/P EST MOD 30 MIN: CPT | Performed by: NURSE PRACTITIONER

## 2022-04-06 PROCEDURE — 83550 IRON BINDING TEST: CPT | Performed by: NURSE PRACTITIONER

## 2022-04-06 PROCEDURE — 80050 GENERAL HEALTH PANEL: CPT | Performed by: NURSE PRACTITIONER

## 2022-04-06 PROCEDURE — 86140 C-REACTIVE PROTEIN: CPT | Performed by: NURSE PRACTITIONER

## 2022-04-06 PROCEDURE — 82306 VITAMIN D 25 HYDROXY: CPT | Performed by: NURSE PRACTITIONER

## 2022-04-06 PROCEDURE — 82728 ASSAY OF FERRITIN: CPT | Performed by: NURSE PRACTITIONER

## 2022-04-06 PROCEDURE — 83735 ASSAY OF MAGNESIUM: CPT | Performed by: NURSE PRACTITIONER

## 2022-04-06 PROCEDURE — 36415 COLL VENOUS BLD VENIPUNCTURE: CPT | Performed by: NURSE PRACTITIONER

## 2022-04-06 RX ORDER — GABAPENTIN 300 MG/1
300 CAPSULE ORAL 2 TIMES DAILY PRN
Qty: 90 CAPSULE | Refills: 1 | Status: SHIPPED | OUTPATIENT
Start: 2022-04-06 | End: 2022-08-26

## 2022-04-06 RX ORDER — HYDROXYZINE PAMOATE 25 MG/1
25 CAPSULE ORAL 3 TIMES DAILY PRN
Qty: 90 CAPSULE | Refills: 1 | Status: SHIPPED | OUTPATIENT
Start: 2022-04-06 | End: 2022-09-22

## 2022-04-06 RX ORDER — AMITRIPTYLINE HYDROCHLORIDE 10 MG/1
10 TABLET ORAL AT BEDTIME
Qty: 90 TABLET | Refills: 1 | Status: SHIPPED | OUTPATIENT
Start: 2022-04-06 | End: 2022-09-22

## 2022-04-06 RX ORDER — LEVOTHYROXINE SODIUM 75 UG/1
75 TABLET ORAL DAILY
Qty: 90 TABLET | Refills: 1 | Status: SHIPPED | OUTPATIENT
Start: 2022-04-06 | End: 2022-05-05

## 2022-04-06 RX ORDER — SERTRALINE HYDROCHLORIDE 100 MG/1
TABLET, FILM COATED ORAL
Qty: 90 TABLET | Refills: 1 | Status: SHIPPED | OUTPATIENT
Start: 2022-04-06 | End: 2022-09-22

## 2022-04-06 ASSESSMENT — PAIN SCALES - GENERAL: PAINLEVEL: SEVERE PAIN (7)

## 2022-04-06 ASSESSMENT — PATIENT HEALTH QUESTIONNAIRE - PHQ9
SUM OF ALL RESPONSES TO PHQ QUESTIONS 1-9: 14
SUM OF ALL RESPONSES TO PHQ QUESTIONS 1-9: 14
10. IF YOU CHECKED OFF ANY PROBLEMS, HOW DIFFICULT HAVE THESE PROBLEMS MADE IT FOR YOU TO DO YOUR WORK, TAKE CARE OF THINGS AT HOME, OR GET ALONG WITH OTHER PEOPLE: EXTREMELY DIFFICULT

## 2022-04-07 LAB
DEPRECATED CALCIDIOL+CALCIFEROL SERPL-MC: 34 UG/L (ref 20–75)
IRON SATN MFR SERPL: 31 % (ref 15–46)
IRON SERPL-MCNC: 78 UG/DL (ref 35–180)
TIBC SERPL-MCNC: 255 UG/DL (ref 240–430)

## 2022-04-07 ASSESSMENT — PATIENT HEALTH QUESTIONNAIRE - PHQ9: SUM OF ALL RESPONSES TO PHQ QUESTIONS 1-9: 14

## 2022-04-14 ENCOUNTER — MYC MEDICAL ADVICE (OUTPATIENT)
Dept: INTERNAL MEDICINE | Facility: CLINIC | Age: 37
End: 2022-04-14
Payer: COMMERCIAL

## 2022-04-15 NOTE — TELEPHONE ENCOUNTER
Please let patient know I am not familiar with patient and if she is having increased pain, she needs to go to walk in clinic    Dr. Epps

## 2022-04-15 NOTE — TELEPHONE ENCOUNTER
Cm msg:   Eugene Covington,     The pain in the left side of my abdomen is pretty aggressive today. Can you recommend or is there something I can do to help alleviate it even just a little? I have taken Tylenol 1000 mg every 6 to 8 hrs and it doesn't really help all that much.     Thank you!  Enedelia      Last appt: 04/06/2022    Pain was discussed. CT scan was done:  IMPRESSION:  1.  No acute findings.  2.  Moderate amount of stool throughout the colon.  3.  Mild diffuse hepatic steatosis    Please advise       Message was sent to pt recommending urgent care if pain gets worse.    Rolando Feliciano Jr., CMA on 4/15/2022 at 8:32 AM

## 2022-04-20 ENCOUNTER — OFFICE VISIT (OUTPATIENT)
Dept: FAMILY MEDICINE | Facility: CLINIC | Age: 37
End: 2022-04-20
Payer: COMMERCIAL

## 2022-04-20 VITALS
SYSTOLIC BLOOD PRESSURE: 103 MMHG | WEIGHT: 202 LBS | HEART RATE: 74 BPM | DIASTOLIC BLOOD PRESSURE: 70 MMHG | RESPIRATION RATE: 18 BRPM | BODY MASS INDEX: 33.61 KG/M2 | OXYGEN SATURATION: 100 % | TEMPERATURE: 98.5 F

## 2022-04-20 DIAGNOSIS — R07.81 PLEURITIC CHEST PAIN: Primary | ICD-10-CM

## 2022-04-20 PROCEDURE — 99215 OFFICE O/P EST HI 40 MIN: CPT | Performed by: PHYSICIAN ASSISTANT

## 2022-04-20 NOTE — PROGRESS NOTES
Patient presents with:  RECHECK: Saw cheri last week still has left side pain going up into ribs, feels nausea with eating pain getting worse      Clinical Decision Making:  Patient was seen by her primary care provider on 4/6 and had a comprehensive work-up.  Patient was continuing with similar symptoms but did have shortness of breath and pleuritic chest pain.  Because she had shortness of breath pleuritic chest pain a CT scan of the chest was obtained to rule out a PE no PE was seen on chest CT. patient will have follow-up with her primary care provider to reevaluate her symptoms.  My suspicion is this may have something to do with her steatohepatitis.       ICD-10-CM    1. Pleuritic chest pain  R07.81 CTA Chest with Contrast       There are no Patient Instructions on file for this visit.    HPI:  Katiuska De Jesus is a 36 year old female who presents today for reevaluation of diffuse abdominal pain.  Patient was seen on 4/6/2022 and had a comprehensive work-up by her primary care provider.  Please see note in epic.  Complete metabolic panel with work-up for anemia and CT of the abdomen and pelvis was also obtained.  The work-up was negative.  Patient states that she has still had continuing diffuse abdominal pain that is intermittent in both the left lower quadrant left upper quadrant and right upper quadrant.  At times the patient can be constant but is mostly intermittent.  She does not have associated dizziness but she has had some nauseousness and shortness of breath or dyspnea on exertion.  This was not coupled with other cardiac symptoms to include chest arm or jaw pain heart palpitations syncope or presyncope.  Lastly, she did not have any association with eating with problematic foods precipitating event or with vomiting or diarrhea.  Patient has been treating this at home with Tylenol without relief.  Specifically there was no fever chills night sweats weight loss melena or hematemesis or  hematochezia.    Patient has not had swelling of the lower extremities no thigh or calf pain no palpitations and has not had a prior history of DVT or PE.  However the patient is using 2 forms of contraception to include an oral contraception and an IUD.  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    Lastly, the patient did not have any respiratory symptoms to include cough no prior history of kidney stones and urinary symptoms and she recently had a CT scan on 4/6 as mentioned.    History obtained from chart review and the patient.    Problem List:  2022-02: Major depression, single episode  2020-08: IBS (irritable bowel syndrome)  2020-01: Lymphopenia  2020-01: Polyarthralgia  2020-01: LETICIA positive  2019-12: Myalgia  2019-10: Routine history and physical examination of adult  2019-07: Depression, unspecified depression type  2017-06: Elevated antinuclear antibody (LETICIA) level  2014-01: Hypothyroid  2013-12: Fatigue  2007-06: Dysmenorrhea  Hypothyroidism      Past Medical History:   Diagnosis Date     Depression      Fatigue      Hypothyroid      Hypothyroidism      IBS (irritable bowel syndrome)      Major depression, single episode 2/11/2022     Migraine        Social History     Tobacco Use     Smoking status: Never Smoker     Smokeless tobacco: Never Used     Tobacco comment: smokers in home   Substance Use Topics     Alcohol use: Yes     Comment: Alcoholic Drinks/day: rarely       Review of Systems  As above in HPI otherwise negative.    Vitals:    04/20/22 1446   BP: 103/70   Pulse: 74   Resp: 18   Temp: 98.5  F (36.9  C)   TempSrc: Oral   SpO2: 100%   Weight: 91.6 kg (202 lb)       General: Patient is resting comfortably no acute distress is afebrile  HEENT: Head is normocephalic atraumatic   eyes are PERRL EOMI sclera anicteric   TMs are clear bilaterally  Throat is with mild pharyngeal wall erythema and no exudate  No cervical lymphadenopathy present  LUNGS:  Clear to auscultation bilaterally  HEART: Regular rate and rhythm  Abdomen: Nontender nondistended no rebound or guarding no masses.  Negative Garcia sign in the right upper quadrant although patient stated that it was uncomfortable.  No CVA tenderness to percussion.  No suprapubic tenderness or induration.  Skin: Without rash non-diaphoretic    Physical Exam      Labs:  Results for orders placed or performed in visit on 04/20/22   CTA Chest with Contrast     Status: None    Narrative    EXAM DATE:         04/20/2022    EXAM: CT ANGIO PULMONARY  LOCATION: [facility]  DATE/TIME: 4/20/2022 6:00 PM    INDICATION: Pain  COMPARISON: CT abdomen/pelvis 04/06/2022  TECHNIQUE: CT chest pulmonary angiogram during arterial phase injection of IV contrast. Multiplanar reformats and MIP reconstructions were performed. Dose reduction techniques were used.  CONTRAST: 100 ml Isovue 370 was administered via iv with 0 mls discarded.    FINDINGS:  ANGIOGRAM CHEST: Pulmonary arteries are normal caliber and negative for pulmonary emboli. Thoracic aorta is negative for dissection. No CT evidence of right heart strain.    LUNGS AND PLEURA: Normal.    MEDIASTINUM/AXILLAE: Normal.    CORONARY ARTERY CALCIFICATION: None.    UPPER ABDOMEN: Normal.    MUSCULOSKELETAL: Normal.    IMPRESSION:  1.  No pulmonary embolus.                 Radiology:    CT Abdomen Pelvis w Contrast    Result Date: 4/6/2022  EXAM DATE:         04/06/2022 EXAM: CT ABDOMEN, PELVIS WITH CONTRAST LOCATION: [facility] DATE/TIME: 4/6/2022 2:30 PM INDICATION: LLQ PAIN COMPARISON: 10/13/2021 pelvic ultrasound TECHNIQUE: CT scan of the abdomen and pelvis was performed following injection of IV contrast. Multiplanar reformats were obtained. Dose reduction techniques were used. CONTRAST: 100 ml Isovue 370 was administered via iv with 0 mls discarded. FINDINGS: LOWER CHEST: Visualized lungs are clear. No pleural  effusion. Heart size normal with no pericardial effusion. HEPATOBILIARY: Mild diffuse hepatic steatosis. Liver otherwise normal. No bile duct dilatation. No calcified gallstones. PANCREAS: Normal. SPLEEN: Spleen size normal. ADRENAL GLANDS: Normal. KIDNEY/BLADDER: Kidneys, ureters and bladder are normal. BOWEL: Normal appendix. Moderate amount of stool throughout the colon. Bowel is otherwise normal with no obstruction or inflammatory change. LYMPH NODES: No lymphadenopathy. VASCULATURE: Normal caliber abdominal aorta. PELVIC ORGANS: No pelvic mass or fluid. IUD appears well-positioned. MUSCULOSKELETAL: Unremarkable. IMPRESSION: 1.  No acute findings. 2.  Moderate amount of stool throughout the colon. 3.  Mild diffuse hepatic steatosis        At the end of the encounter, I discussed results, diagnosis, medications. Discussed red flags for immediate return to clinic/ER, as well as indications for follow up if no improvement. Patient understood and agreed to plan. Patient was stable for discharge.

## 2022-05-04 NOTE — PROGRESS NOTES
"  Assessment & Plan   Problem List Items Addressed This Visit        Endocrine    Hypothyroidism    Relevant Medications    levothyroxine (SYNTHROID/LEVOTHROID) 88 MCG tablet       Other    Fatigue - Primary    Relevant Orders    Adult Genetics & Metabolism Referral      Other Visit Diagnoses     LLQ pain        Relevant Orders    Adult Gastro Ref - Consult Only    XR Abdomen 2 Views (Completed)      Patient reports ongoing fatigue family history of same or similar as well as her sisters recent diagnosis of MS patient is requesting additional genetic testing and referral be placed at her request.    For hypothyroidism increase levothyroxine to 88 mcg daily energy stomach.    For patient's left lower quadrant pain with history of constipation obtain x-ray today which to continues to show increase in stool with recommendation for MiraLAX 1-2 times daily in addition to adequate water intake and physical activity most days of the week 30 minutes.  In addition will recommend referral to GI and a referral has been placed.             BMI:   Estimated body mass index is 34.03 kg/m  as calculated from the following:    Height as of this encounter: 1.651 m (5' 5\").    Weight as of this encounter: 92.8 kg (204 lb 8 oz).   Weight management plan: Discussed healthy diet and exercise guidelines        No follow-ups on file.    Nadya Villalpando NP  [facility]      Fatigue, headaches, does not feel well rested in the morning.      LLQ abd pain hx of constipation, though symptoms do not improve with bowel movement     Subjective   Enedelia is a 36 year old who presents for the following health issues     History of Present Illness       Reason for visit:  Left side pain still and fatigue  Symptom onset:  More than a month  Symptoms include:  Constant side and adominal pain and increased fatigue that is interfering with my job  Symptom intensity:  Severe  Symptom progression:  Staying the same  Had these symptoms " "before:  Yes  Has tried/received treatment for these symptoms:  Yes  Previous treatment was successful:  No  What makes it worse:  Sitting in regards to the side pain.  What makes it better:  I can take the edge off the pain by using a heating pad and taking a lot of Tylenol. It doesn't help much but sometimes just a tiny lessing of pain is a kindness    She eats 2-3 servings of fruits and vegetables daily.She consumes 0 sweetened beverage(s) daily.She exercises with enough effort to increase her heart rate 10 to 19 minutes per day.  She exercises with enough effort to increase her heart rate 3 or less days per week.   She is taking medications regularly.       Review of Systems   Unremarkable other than listed above and below         Objective    /66 (BP Location: Right arm, Patient Position: Sitting, Cuff Size: Adult Regular)   Pulse 66   Temp 97  F (36.1  C) (Oral)   Resp 16   Ht 1.651 m (5' 5\")   Wt 92.8 kg (204 lb 8 oz)   BMI 34.03 kg/m    Body mass index is 34.03 kg/m .  Physical Exam   GENERAL: healthy, alert and no distress  EYES: Eyes grossly normal to inspection, conjunctivae and sclerae normal  RESP: respirations regular nonlabored   ABDOMEN: soft, nontender, no hepatosplenomegaly, no masses and bowel sounds normal  MS: no gross musculoskeletal defects noted, no edema  PSYCH: mentation appears normal, affect normal/bright    Office Visit on 04/06/2022   Component Date Value Ref Range Status     Vitamin B12 04/06/2022 553  213 - 816 pg/mL Final     Magnesium 04/06/2022 2.1  1.8 - 2.6 mg/dL Final     Iron 04/06/2022 78  35 - 180 ug/dL Final     Iron Binding Capacity 04/06/2022 255  240 - 430 ug/dL Final     Iron Sat Index 04/06/2022 31  15 - 46 % Final     Ferritin 04/06/2022 80  10 - 130 ng/mL Final     Vitamin D, Total (25-Hydroxy) 04/06/2022 34  20 - 75 ug/L Final     TSH 04/06/2022 1.92  0.30 - 5.00 uIU/mL Final     Sodium 04/06/2022 137  136 - 145 mmol/L Final     Potassium 04/06/2022 4.0  " 3.5 - 5.0 mmol/L Final     Chloride 04/06/2022 105  98 - 107 mmol/L Final     Carbon Dioxide (CO2) 04/06/2022 24  22 - 31 mmol/L Final     Anion Gap 04/06/2022 8  5 - 18 mmol/L Final     Urea Nitrogen 04/06/2022 14  8 - 22 mg/dL Final     Creatinine 04/06/2022 0.77  0.60 - 1.10 mg/dL Final     Calcium 04/06/2022 8.5  8.5 - 10.5 mg/dL Final     Glucose 04/06/2022 81  70 - 125 mg/dL Final     Alkaline Phosphatase 04/06/2022 63  45 - 120 U/L Final     AST 04/06/2022 19  0 - 40 U/L Final     ALT 04/06/2022 24  0 - 45 U/L Final     Protein Total 04/06/2022 7.4  6.0 - 8.0 g/dL Final     Albumin 04/06/2022 3.6  3.5 - 5.0 g/dL Final     Bilirubin Total 04/06/2022 0.3  0.0 - 1.0 mg/dL Final     GFR Estimate 04/06/2022 >90  >60 mL/min/1.73m2 Final    Effective December 21, 2021 eGFRcr in adults is calculated using the 2021 CKD-EPI creatinine equation which includes age and gender (Cj et al., NE, DOI: 10.1056/VZJQsr1195068)     CRP 04/06/2022 <0.1  0.0-<0.8 mg/dL Final     WBC Count 04/06/2022 4.7  4.0 - 11.0 10e3/uL Final     RBC Count 04/06/2022 4.05  3.80 - 5.20 10e6/uL Final     Hemoglobin 04/06/2022 12.8  11.7 - 15.7 g/dL Final     Hematocrit 04/06/2022 36.5  35.0 - 47.0 % Final     MCV 04/06/2022 90  78 - 100 fL Final     MCH 04/06/2022 31.6  26.5 - 33.0 pg Final     MCHC 04/06/2022 35.1  31.5 - 36.5 g/dL Final     RDW 04/06/2022 12.1  10.0 - 15.0 % Final     Platelet Count 04/06/2022 152  150 - 450 10e3/uL Final     % Neutrophils 04/06/2022 60  % Final     % Lymphocytes 04/06/2022 29  % Final     % Monocytes 04/06/2022 8  % Final     % Eosinophils 04/06/2022 2  % Final     % Basophils 04/06/2022 0  % Final     % Immature Granulocytes 04/06/2022 0  % Final     Absolute Neutrophils 04/06/2022 2.8  1.6 - 8.3 10e3/uL Final     Absolute Lymphocytes 04/06/2022 1.3  0.8 - 5.3 10e3/uL Final     Absolute Monocytes 04/06/2022 0.4  0.0 - 1.3 10e3/uL Final     Absolute Eosinophils 04/06/2022 0.1  0.0 - 0.7 10e3/uL Final      Absolute Basophils 04/06/2022 0.0  0.0 - 0.2 10e3/uL Final     Absolute Immature Granulocytes 04/06/2022 0.0  <=0.4 10e3/uL Final     Color Urine 04/06/2022 Yellow  Colorless, Straw, Light Yellow, Yellow Final     Appearance Urine 04/06/2022 Clear  Clear Final     Glucose Urine 04/06/2022 Negative  Negative mg/dL Final     Bilirubin Urine 04/06/2022 Negative  Negative Final     Ketones Urine 04/06/2022 Negative  Negative mg/dL Final     Specific Gravity Urine 04/06/2022 1.010  1.005 - 1.030 Final     Blood Urine 04/06/2022 Negative  Negative Final     pH Urine 04/06/2022 6.5  5.0 - 8.0 Final     Protein Albumin Urine 04/06/2022 Negative  Negative mg/dL Final     Urobilinogen Urine 04/06/2022 0.2  0.2, 1.0 E.U./dL Final     Nitrite Urine 04/06/2022 Negative  Negative Final     Leukocyte Esterase Urine 04/06/2022 Negative  Negative Final

## 2022-05-05 ENCOUNTER — OFFICE VISIT (OUTPATIENT)
Dept: INTERNAL MEDICINE | Facility: CLINIC | Age: 37
End: 2022-05-05
Payer: COMMERCIAL

## 2022-05-05 ENCOUNTER — HOSPITAL ENCOUNTER (OUTPATIENT)
Dept: GENERAL RADIOLOGY | Facility: HOSPITAL | Age: 37
Discharge: HOME | End: 2022-05-05
Attending: NURSE PRACTITIONER | Admitting: NURSE PRACTITIONER
Payer: COMMERCIAL

## 2022-05-05 VITALS
HEIGHT: 65 IN | HEART RATE: 66 BPM | BODY MASS INDEX: 34.07 KG/M2 | WEIGHT: 204.5 LBS | TEMPERATURE: 97 F | RESPIRATION RATE: 16 BRPM | SYSTOLIC BLOOD PRESSURE: 104 MMHG | DIASTOLIC BLOOD PRESSURE: 66 MMHG

## 2022-05-05 DIAGNOSIS — E03.9 HYPOTHYROIDISM, UNSPECIFIED TYPE: ICD-10-CM

## 2022-05-05 DIAGNOSIS — R53.83 OTHER FATIGUE: Primary | ICD-10-CM

## 2022-05-05 DIAGNOSIS — R10.32 LLQ PAIN: ICD-10-CM

## 2022-05-05 PROCEDURE — 74019 RADEX ABDOMEN 2 VIEWS: CPT | Mod: FY

## 2022-05-05 PROCEDURE — 99214 OFFICE O/P EST MOD 30 MIN: CPT | Performed by: NURSE PRACTITIONER

## 2022-05-05 RX ORDER — LEVOTHYROXINE SODIUM 88 UG/1
88 TABLET ORAL DAILY
Qty: 90 TABLET | Refills: 3 | Status: SHIPPED | OUTPATIENT
Start: 2022-05-05 | End: 2023-05-11

## 2022-05-05 ASSESSMENT — PATIENT HEALTH QUESTIONNAIRE - PHQ9
SUM OF ALL RESPONSES TO PHQ QUESTIONS 1-9: 10
10. IF YOU CHECKED OFF ANY PROBLEMS, HOW DIFFICULT HAVE THESE PROBLEMS MADE IT FOR YOU TO DO YOUR WORK, TAKE CARE OF THINGS AT HOME, OR GET ALONG WITH OTHER PEOPLE: EXTREMELY DIFFICULT
SUM OF ALL RESPONSES TO PHQ QUESTIONS 1-9: 10

## 2022-05-05 ASSESSMENT — PAIN SCALES - GENERAL: PAINLEVEL: SEVERE PAIN (7)

## 2022-05-06 ASSESSMENT — PATIENT HEALTH QUESTIONNAIRE - PHQ9: SUM OF ALL RESPONSES TO PHQ QUESTIONS 1-9: 10

## 2022-05-12 ENCOUNTER — TRANSFERRED RECORDS (OUTPATIENT)
Dept: HEALTH INFORMATION MANAGEMENT | Facility: CLINIC | Age: 37
End: 2022-05-12
Payer: COMMERCIAL

## 2022-05-18 ENCOUNTER — MYC MEDICAL ADVICE (OUTPATIENT)
Dept: INTERNAL MEDICINE | Facility: CLINIC | Age: 37
End: 2022-05-18
Payer: COMMERCIAL

## 2022-05-18 DIAGNOSIS — Z82.0 FAMILY HISTORY OF MS (MULTIPLE SCLEROSIS): Primary | ICD-10-CM

## 2022-06-09 ENCOUNTER — TRANSFERRED RECORDS (OUTPATIENT)
Dept: HEALTH INFORMATION MANAGEMENT | Facility: CLINIC | Age: 37
End: 2022-06-09
Payer: COMMERCIAL

## 2022-06-13 ENCOUNTER — MYC MEDICAL ADVICE (OUTPATIENT)
Dept: FAMILY MEDICINE | Facility: CLINIC | Age: 37
End: 2022-06-13
Payer: COMMERCIAL

## 2022-06-13 ENCOUNTER — TELEPHONE (OUTPATIENT)
Dept: INTERNAL MEDICINE | Facility: CLINIC | Age: 37
End: 2022-06-13
Payer: COMMERCIAL

## 2022-06-13 NOTE — TELEPHONE ENCOUNTER
Patient was just scheduled for hysterectomy for 06/23/2022.  Would like to know if she can have pre op with PCP?    Not able to schedule with another provider at [facility] due to schedules

## 2022-06-13 NOTE — TELEPHONE ENCOUNTER
Pt is active on [facility]. Msg sent. Holding appt slot for 06/22/2022 at 9am.      Rolando Feliciano Jr., CMA on 6/13/2022 at 4:31 PM

## 2022-06-14 NOTE — TELEPHONE ENCOUNTER
Pt is scheduled for the 9am preop appointment slot on 06/22/22      Rolando Feliciano Jr., JOSE MANUEL on 6/14/2022 at 11:57 AM

## 2022-06-15 ASSESSMENT — PATIENT HEALTH QUESTIONNAIRE - PHQ9
SUM OF ALL RESPONSES TO PHQ QUESTIONS 1-9: 8
SUM OF ALL RESPONSES TO PHQ QUESTIONS 1-9: 8

## 2022-06-15 NOTE — TELEPHONE ENCOUNTER
[facility]    PCP would like to see pt on Friday, 06/17/2022 for the preop at 10am (arrival) 1020a (appt time).       If pt does call back, please assist as needed.       Thank you,   Rolando Feliciano Jr., CMA on 6/15/2022 at 1:13 PM

## 2022-06-16 NOTE — PROGRESS NOTES
[address] [address]  Phone: [phone]  Fax: [phone]  Primary Provider: Nadya Villalpando  Pre-op Performing Provider: CHACE FRANK    PREOPERATIVE EVALUATION:  Today's date: 6/17/2022    Katiuska De Jesus is a 36 year old female who presents for a preoperative evaluation.    Surgical Information:  Surgery/Procedure: Hysterectomy   Surgery Location: [facility]   Surgeon: Dr. Walker   Surgery Date: 06/23/2022  Time of Surgery: 7am   Where patient plans to recover: At home with family  Fax number for surgical facility: Note does not need to be faxed, will be available electronically in Epic.    Type of Anesthesia Anticipated: General    Assessment & Plan     The proposed surgical procedure is considered INTERMEDIATE risk.    Problem List Items Addressed This Visit    None     Visit Diagnoses     Preop general physical exam    -  Primary    Relevant Orders    CBC with platelets (Completed)    Basic metabolic panel  (Ca, Cl, CO2, Creat, Gluc, K, Na, BUN) (Completed)    HCG qualitative urine (Completed)        Proposed surgery is intermediate risk  RCRI 0  Will get CBC, BMP and hCG for preop work-up  As far as medication management, patient is okay to take all of her regularly scheduled medications the day of the procedure.  Would perhaps hold the hydroxyzine the morning of.  Would stay away from all NSAIDs for 7 days prior to procedure  Patient is tentatively cleared for surgery pending the results of the blood work    Risks and Recommendations:  The patient has the following additional risks and recommendations for perioperative complications:   - No identified additional risk factors other than previously addressed    Medication Instructions:  See discussion above    RECOMMENDATION:  APPROVAL GIVEN to proceed with proposed procedure pending review of diagnostic evaluation.    Subjective     HPI related to upcoming  procedure:     Patient is a 36-year-old female with PMH of chronic fatigue syndrome, MDD, dysmenorrhea, dysphagia, hypothyroidism, IBS, leukopenia, chronic low back pain, myalgias and polyarthralgias who presents today for preop for hysterectomy    Patient reports that she has had dysmenorrhea and menorrhagia for her entire life.  Has gotten really bad in the last 5 to 6 years.  Is on 2 different forms of birth control and still having heavy bleeding.  This is why they are going in for a hysterectomy.    Patient has no known complications related to anesthesia.  No history of bleeding disorders.  No history of blood clots.    Patient does require a negative COVID test but she is okay to do a home antigen test and send in a photo to the surgical team.      (+) pain in the uterus  (+) heavy pain with bleeding + pain   5-6 years was really   surgey in the past     No anesthsia     No bleeding disorder   No hx blood clots     In home antigen , 3 days              Preop Questions 6/15/2022   1. Have you ever had a heart attack or stroke? No   2. Have you ever had surgery on your heart or blood vessels, such as a stent placement, a coronary artery bypass, or surgery on an artery in your head, neck, heart, or legs? No   3. Do you have chest pain with activity? No   4. Do you have a history of  heart failure? No   5. Do you currently have a cold, bronchitis or symptoms of other infection? No   6. Do you have a cough, shortness of breath, or wheezing? YES -  With running, wheezes but no hx of asthma    7. Do you or anyone in your family have previous history of blood clots? No   8. Do you or does anyone in your family have a serious bleeding problem such as prolonged bleeding following surgeries or cuts? No   9. Have you ever had problems with anemia or been told to take iron pills? No   10. Have you had any abnormal blood loss such as black, tarry or bloody stools, or abnormal vaginal bleeding? No    11. Have you ever had a  blood transfusion? No   12. Are you willing to have a blood transfusion if it is medically needed before, during, or after your surgery? Yes   13. Have you or any of your relatives ever had problems with anesthesia? No   14. Do you have sleep apnea, excessive snoring or daytime drowsiness? No. No hx of SKY    15. Do you have any artifical heart valves or other implanted medical devices like a pacemaker, defibrillator, or continuous glucose monitor? No   16. Do you have artificial joints? No   17. Are you allergic to latex? No   18. Is there any chance that you may be pregnant? No       Health Care Directive:  Patient does not have a Health Care Directive or Living Will: Discussed advance care planning with patient; information given to patient to review.  Full code   POA: Bryan De Jesus   [phone]      Preoperative Review of :   reviewed - no record of controlled substances prescribed.      Review of Systems  CONSTITUTIONAL: NEGATIVE for fever, chills, change in weight  INTEGUMENTARY/SKIN: NEGATIVE for worrisome rashes, moles or lesions  EYES: NEGATIVE for vision changes or irritation  ENT/MOUTH: NEGATIVE for ear, mouth and throat problems  RESP: NEGATIVE for significant cough or SOB  CV: NEGATIVE for chest pain, palpitations or peripheral edema  GI: NEGATIVE for nausea, abdominal pain, heartburn, or change in bowel habits  : NEGATIVE for frequency, dysuria, or hematuria  MUSCULOSKELETAL: NEGATIVE for significant arthralgias or myalgia  NEURO: NEGATIVE for weakness, dizziness or paresthesias  ENDOCRINE: NEGATIVE for temperature intolerance, skin/hair changes  HEME: NEGATIVE for bleeding problems  PSYCHIATRIC: NEGATIVE for changes in mood or affect    Patient Active Problem List    Diagnosis Date Noted     Mild major depression (H) 04/16/2021     Priority: Medium     Fibromyalgia 03/04/2021     Priority: Medium     Mechanical low back pain 03/03/2021     Priority: Medium     IBS (irritable bowel syndrome)  08/14/2020     Priority: Medium     Dysphagia 06/22/2020     Priority: Medium     Lymphopenia 01/23/2020     Priority: Medium     Polyarthralgia 01/23/2020     Priority: Medium     LETICIA positive 01/23/2020     Priority: Medium     Myalgia 12/05/2019     Priority: Medium     Depression, unspecified depression type 07/19/2019     Priority: Medium     Elevated antinuclear antibody (LETICIA) level 06/08/2017     Priority: Medium     Hypothyroidism 01/07/2014     Priority: Medium     Chronic fatigue syndrome 12/27/2013     Priority: Medium     Dysmenorrhea 06/18/2007     Priority: Medium     Overview:   Managed on [facility] since 2005        Past Medical History:   Diagnosis Date     Depression      Fatigue      Hypothyroid      Hypothyroidism      IBS (irritable bowel syndrome)      Major depression, single episode 2/11/2022     Migraine      Past Surgical History:   Procedure Laterality Date     NO HISTORY OF SURGERY       Current Outpatient Medications   Medication Sig Dispense Refill     acetaminophen (TYLENOL) 500 MG tablet Take 1,000 mg by mouth       albuterol (PROAIR HFA/PROVENTIL HFA/VENTOLIN HFA) 108 (90 Base) MCG/ACT inhaler Inhale 2 puffs into the lungs every 6 hours as needed for shortness of breath / dyspnea or wheezing 18 g 4     amitriptyline (ELAVIL) 10 MG tablet Take 1 tablet (10 mg) by mouth At Bedtime 90 tablet 1     fluticasone (FLONASE) 50 MCG/ACT spray Spray 1 spray into both nostrils daily       gabapentin (NEURONTIN) 300 MG capsule Take 1 capsule (300 mg) by mouth 2 times daily as needed (pain) 90 capsule 1     JAYCEE 0.35 MG tablet Take 1 tablet (0.35 mg total) by mouth daily. 84 tablet 3     hydrOXYzine (VISTARIL) 25 MG capsule Take 1 capsule (25 mg) by mouth 3 times daily as needed for anxiety 90 capsule 1     levonorgestrel (MIRENA) 20 MCG/24HR IUD        levothyroxine (SYNTHROID/LEVOTHROID) 88 MCG tablet Take 1 tablet (88 mcg) by mouth daily 90 tablet 3     sertraline (ZOLOFT) 100 MG tablet Take 1  "tablet (100 mg total) by mouth daily. 90 tablet 1       Allergies   Allergen Reactions     Gluten Meal      Ibuprofen Sodium Cramps and GI Disturbance        Social History     Tobacco Use     Smoking status: Never Smoker     Smokeless tobacco: Never Used     Tobacco comment: smokers in home   Substance Use Topics     Alcohol use: Not Currently     Comment: Alcoholic Drinks/day: rarely     Family History   Problem Relation Age of Onset     Thyroid Disease Other      Substance Abuse Father         EtOH     Thyroid Disease Maternal Grandmother      Cancer Brother         tumor in his thigh     Other Cancer Brother      Thyroid Disease Mother      Irritable Bowel Syndrome Sister      Depression Sister      No Known Problems Brother      Cancer Brother          Tumor  in this thigh     Alcoholism Brother      Depression Brother      Substance Abuse Brother      Early Death Brother      Snoring Brother      Arthritis Maternal Grandmother      Asthma Maternal Grandmother      Diabetes Maternal Grandmother      Arthritis Mother      Depression Mother      Alcoholism Father      History   Drug Use Unknown         Objective     /62 (BP Location: Right arm, Patient Position: Sitting, Cuff Size: Adult Regular)   Pulse 82   Temp 98  F (36.7  C) (Oral)   Resp 16   Ht 1.651 m (5' 5\")   Wt 90.5 kg (199 lb 8 oz)   LMP  (LMP Unknown)   SpO2 97%   Breastfeeding No   BMI 33.20 kg/m      Physical Exam    GENERAL APPEARANCE: healthy, alert and no distress     EYES: EOMI, PERRL     HENT: ear canals and TM's normal and nose and mouth without ulcers or lesions     NECK: no adenopathy, no asymmetry, masses, or scars and thyroid normal to palpation     RESP: lungs clear to auscultation - no rales, rhonchi or wheezes     CV: regular rates and rhythm, normal S1 S2, no S3 or S4 and no murmur, click or rub     ABDOMEN:  soft, (+) suprapubic region and lower abdomen bilaterally,  no HSM or masses and bowel sounds normal     MS: " extremities normal- no gross deformities noted, no evidence of inflammation in joints, FROM in all extremities.     SKIN: no suspicious lesions or rashes     NEURO: Normal strength and tone, sensory exam grossly normal, mentation intact and speech normal     PSYCH: mentation appears normal. and affect anxious     LYMPHATICS: No cervical adenopathy    Recent Labs   Lab Test 04/06/22  1503 09/01/21  0919   HGB 12.8 13.6    146*    141   POTASSIUM 4.0 4.4   CR 0.77 0.88        Diagnostics:  Labs pending at this time.  Results will be reviewed when available.   No EKG required, no history of coronary heart disease, significant arrhythmia, peripheral arterial disease or other structural heart disease.    Revised Cardiac Risk Index (RCRI):  The patient has the following serious cardiovascular risks for perioperative complications:   - No serious cardiac risks = 0 points     RCRI Interpretation: 0 points: Class I (very low risk - 0.4% complication rate)           Signed Electronically by: Nadya Villalpando NP  Copy of this evaluation report is provided to requesting physician.

## 2022-06-17 ENCOUNTER — OFFICE VISIT (OUTPATIENT)
Dept: FAMILY MEDICINE | Facility: CLINIC | Age: 37
End: 2022-06-17
Payer: COMMERCIAL

## 2022-06-17 VITALS
WEIGHT: 199.5 LBS | TEMPERATURE: 98 F | HEART RATE: 82 BPM | SYSTOLIC BLOOD PRESSURE: 126 MMHG | HEIGHT: 65 IN | BODY MASS INDEX: 33.24 KG/M2 | OXYGEN SATURATION: 97 % | RESPIRATION RATE: 16 BRPM | DIASTOLIC BLOOD PRESSURE: 62 MMHG

## 2022-06-17 DIAGNOSIS — Z01.818 PREOP GENERAL PHYSICAL EXAM: Primary | ICD-10-CM

## 2022-06-17 DIAGNOSIS — N94.6 DYSMENORRHEA: ICD-10-CM

## 2022-06-17 PROBLEM — F32.0 MILD MAJOR DEPRESSION (H): Status: ACTIVE | Noted: 2021-04-16

## 2022-06-17 PROBLEM — R10.84 GENERALIZED ABDOMINAL PAIN: Status: ACTIVE | Noted: 2022-06-17

## 2022-06-17 PROBLEM — R19.8 IRREGULAR BOWEL HABITS: Status: ACTIVE | Noted: 2020-06-22

## 2022-06-17 PROBLEM — Z00.00 ROUTINE HISTORY AND PHYSICAL EXAMINATION OF ADULT: Status: RESOLVED | Noted: 2019-10-18 | Resolved: 2022-06-17

## 2022-06-17 PROBLEM — M79.7 FIBROMYALGIA: Status: ACTIVE | Noted: 2021-03-04

## 2022-06-17 PROBLEM — Z87.19 HISTORY OF IRRITABLE BOWEL SYNDROME: Status: ACTIVE | Noted: 2022-06-17

## 2022-06-17 PROBLEM — R19.8 IRREGULAR BOWEL HABITS: Status: RESOLVED | Noted: 2020-06-22 | Resolved: 2022-06-17

## 2022-06-17 PROBLEM — R10.84 GENERALIZED ABDOMINAL PAIN: Status: RESOLVED | Noted: 2022-06-17 | Resolved: 2022-06-17

## 2022-06-17 PROBLEM — Z87.19 HISTORY OF IRRITABLE BOWEL SYNDROME: Status: RESOLVED | Noted: 2022-06-17 | Resolved: 2022-06-17

## 2022-06-17 LAB
ANION GAP SERPL CALCULATED.3IONS-SCNC: 8 MMOL/L (ref 5–18)
BUN SERPL-MCNC: 13 MG/DL (ref 8–22)
CALCIUM SERPL-MCNC: 9.3 MG/DL (ref 8.5–10.5)
CHLORIDE BLD-SCNC: 105 MMOL/L (ref 98–107)
CO2 SERPL-SCNC: 25 MMOL/L (ref 22–31)
CREAT SERPL-MCNC: 0.8 MG/DL (ref 0.6–1.1)
ERYTHROCYTE [DISTWIDTH] IN BLOOD BY AUTOMATED COUNT: 11.8 % (ref 10–15)
GFR SERPL CREATININE-BSD FRML MDRD: >90 ML/MIN/1.73M2
GLUCOSE BLD-MCNC: 81 MG/DL (ref 70–125)
HCG UR QL: NEGATIVE
HCT VFR BLD AUTO: 38.8 % (ref 35–47)
HGB BLD-MCNC: 13.3 G/DL (ref 11.7–15.7)
MCH RBC QN AUTO: 30.6 PG (ref 26.5–33)
MCHC RBC AUTO-ENTMCNC: 34.3 G/DL (ref 31.5–36.5)
MCV RBC AUTO: 89 FL (ref 78–100)
PLATELET # BLD AUTO: 145 10E3/UL (ref 150–450)
POTASSIUM BLD-SCNC: 4.1 MMOL/L (ref 3.5–5)
RBC # BLD AUTO: 4.34 10E6/UL (ref 3.8–5.2)
SODIUM SERPL-SCNC: 138 MMOL/L (ref 136–145)
WBC # BLD AUTO: 3.9 10E3/UL (ref 4–11)

## 2022-06-17 PROCEDURE — 80048 BASIC METABOLIC PNL TOTAL CA: CPT | Performed by: STUDENT IN AN ORGANIZED HEALTH CARE EDUCATION/TRAINING PROGRAM

## 2022-06-17 PROCEDURE — 36415 COLL VENOUS BLD VENIPUNCTURE: CPT | Performed by: STUDENT IN AN ORGANIZED HEALTH CARE EDUCATION/TRAINING PROGRAM

## 2022-06-17 PROCEDURE — 99214 OFFICE O/P EST MOD 30 MIN: CPT | Performed by: STUDENT IN AN ORGANIZED HEALTH CARE EDUCATION/TRAINING PROGRAM

## 2022-06-17 PROCEDURE — 81025 URINE PREGNANCY TEST: CPT | Performed by: STUDENT IN AN ORGANIZED HEALTH CARE EDUCATION/TRAINING PROGRAM

## 2022-06-17 PROCEDURE — 85027 COMPLETE CBC AUTOMATED: CPT | Performed by: STUDENT IN AN ORGANIZED HEALTH CARE EDUCATION/TRAINING PROGRAM

## 2022-06-17 ASSESSMENT — PATIENT HEALTH QUESTIONNAIRE - PHQ9
SUM OF ALL RESPONSES TO PHQ QUESTIONS 1-9: 8
10. IF YOU CHECKED OFF ANY PROBLEMS, HOW DIFFICULT HAVE THESE PROBLEMS MADE IT FOR YOU TO DO YOUR WORK, TAKE CARE OF THINGS AT HOME, OR GET ALONG WITH OTHER PEOPLE: VERY DIFFICULT

## 2022-06-17 ASSESSMENT — PAIN SCALES - GENERAL: PAINLEVEL: NO PAIN (0)

## 2022-06-17 NOTE — PATIENT INSTRUCTIONS
For your surgery:  - Please hold all NSAIDs until the surgery  -please hold hydroxyzine the morning of  - Okay to continue all other scheduled medications

## 2022-06-23 ENCOUNTER — ANESTHESIA (OUTPATIENT)
Dept: SURGERY | Facility: HOSPITAL | Age: 37
End: 2022-06-23
Payer: COMMERCIAL

## 2022-06-23 ENCOUNTER — HOSPITAL ENCOUNTER (OUTPATIENT)
Facility: HOSPITAL | Age: 37
Discharge: HOME | End: 2022-06-23
Attending: OBSTETRICS & GYNECOLOGY | Admitting: OBSTETRICS & GYNECOLOGY
Payer: COMMERCIAL

## 2022-06-23 ENCOUNTER — DOCUMENTATION ONLY (OUTPATIENT)
Dept: OTHER | Facility: CLINIC | Age: 37
End: 2022-06-23

## 2022-06-23 ENCOUNTER — ANESTHESIA EVENT (OUTPATIENT)
Dept: SURGERY | Facility: HOSPITAL | Age: 37
End: 2022-06-23
Payer: COMMERCIAL

## 2022-06-23 VITALS
TEMPERATURE: 98.7 F | HEIGHT: 65 IN | WEIGHT: 200.8 LBS | HEART RATE: 76 BPM | SYSTOLIC BLOOD PRESSURE: 108 MMHG | OXYGEN SATURATION: 97 % | RESPIRATION RATE: 18 BRPM | DIASTOLIC BLOOD PRESSURE: 62 MMHG | BODY MASS INDEX: 33.45 KG/M2

## 2022-06-23 DIAGNOSIS — Z90.710 H/O TOTAL VAGINAL HYSTERECTOMY: Primary | ICD-10-CM

## 2022-06-23 LAB
ABO/RH(D): NORMAL
ANTIBODY SCREEN: NEGATIVE
BASOPHILS # BLD AUTO: 0 10E3/UL (ref 0–0.2)
BASOPHILS NFR BLD AUTO: 1 %
EOSINOPHIL # BLD AUTO: 0.1 10E3/UL (ref 0–0.7)
EOSINOPHIL NFR BLD AUTO: 2 %
ERYTHROCYTE [DISTWIDTH] IN BLOOD BY AUTOMATED COUNT: 12.1 % (ref 10–15)
HCG UR QL: NEGATIVE
HCT VFR BLD AUTO: 37.3 % (ref 35–47)
HGB BLD-MCNC: 12.8 G/DL (ref 11.7–15.7)
IMM GRANULOCYTES # BLD: 0 10E3/UL
IMM GRANULOCYTES NFR BLD: 0 %
LYMPHOCYTES # BLD AUTO: 1.5 10E3/UL (ref 0.8–5.3)
LYMPHOCYTES NFR BLD AUTO: 35 %
MCH RBC QN AUTO: 31.1 PG (ref 26.5–33)
MCHC RBC AUTO-ENTMCNC: 34.3 G/DL (ref 31.5–36.5)
MCV RBC AUTO: 91 FL (ref 78–100)
MONOCYTES # BLD AUTO: 0.4 10E3/UL (ref 0–1.3)
MONOCYTES NFR BLD AUTO: 9 %
NEUTROPHILS # BLD AUTO: 2.3 10E3/UL (ref 1.6–8.3)
NEUTROPHILS NFR BLD AUTO: 53 %
NRBC # BLD AUTO: 0 10E3/UL
NRBC BLD AUTO-RTO: 0 /100
PLATELET # BLD AUTO: 142 10E3/UL (ref 150–450)
RBC # BLD AUTO: 4.11 10E6/UL (ref 3.8–5.2)
SPECIMEN EXPIRATION DATE: NORMAL
WBC # BLD AUTO: 4.4 10E3/UL (ref 4–11)

## 2022-06-23 PROCEDURE — 999N000141 HC STATISTIC PRE-PROCEDURE NURSING ASSESSMENT: Performed by: OBSTETRICS & GYNECOLOGY

## 2022-06-23 PROCEDURE — 710N000009 HC RECOVERY PHASE 1, LEVEL 1, PER MIN: Performed by: OBSTETRICS & GYNECOLOGY

## 2022-06-23 PROCEDURE — 258N000003 HC RX IP 258 OP 636: Performed by: ANESTHESIOLOGY

## 2022-06-23 PROCEDURE — 250N000011 HC RX IP 250 OP 636

## 2022-06-23 PROCEDURE — 250N000013 HC RX MED GY IP 250 OP 250 PS 637: Performed by: ANESTHESIOLOGY

## 2022-06-23 PROCEDURE — 250N000011 HC RX IP 250 OP 636: Performed by: ANESTHESIOLOGY

## 2022-06-23 PROCEDURE — 36415 COLL VENOUS BLD VENIPUNCTURE: CPT | Performed by: NURSE PRACTITIONER

## 2022-06-23 PROCEDURE — 250N000009 HC RX 250: Performed by: ANESTHESIOLOGY

## 2022-06-23 PROCEDURE — 86850 RBC ANTIBODY SCREEN: CPT | Performed by: NURSE PRACTITIONER

## 2022-06-23 PROCEDURE — 88307 TISSUE EXAM BY PATHOLOGIST: CPT | Mod: 26 | Performed by: PATHOLOGY

## 2022-06-23 PROCEDURE — 250N000025 HC SEVOFLURANE, PER MIN: Performed by: OBSTETRICS & GYNECOLOGY

## 2022-06-23 PROCEDURE — 81025 URINE PREGNANCY TEST: CPT | Performed by: NURSE PRACTITIONER

## 2022-06-23 PROCEDURE — 272N000001 HC OR GENERAL SUPPLY STERILE: Performed by: OBSTETRICS & GYNECOLOGY

## 2022-06-23 PROCEDURE — 250N000009 HC RX 250

## 2022-06-23 PROCEDURE — 258N000003 HC RX IP 258 OP 636: Performed by: OBSTETRICS & GYNECOLOGY

## 2022-06-23 PROCEDURE — 710N000012 HC RECOVERY PHASE 2, PER MINUTE: Performed by: OBSTETRICS & GYNECOLOGY

## 2022-06-23 PROCEDURE — 370N000017 HC ANESTHESIA TECHNICAL FEE, PER MIN: Performed by: OBSTETRICS & GYNECOLOGY

## 2022-06-23 PROCEDURE — 88307 TISSUE EXAM BY PATHOLOGIST: CPT | Mod: TC | Performed by: OBSTETRICS & GYNECOLOGY

## 2022-06-23 PROCEDURE — 250N000011 HC RX IP 250 OP 636: Performed by: OBSTETRICS & GYNECOLOGY

## 2022-06-23 PROCEDURE — 250N000009 HC RX 250: Performed by: OBSTETRICS & GYNECOLOGY

## 2022-06-23 PROCEDURE — 360N000077 HC SURGERY LEVEL 4, PER MIN: Performed by: OBSTETRICS & GYNECOLOGY

## 2022-06-23 PROCEDURE — 85014 HEMATOCRIT: CPT | Performed by: NURSE PRACTITIONER

## 2022-06-23 PROCEDURE — 250N000013 HC RX MED GY IP 250 OP 250 PS 637: Performed by: NURSE PRACTITIONER

## 2022-06-23 RX ORDER — OXYCODONE HYDROCHLORIDE 5 MG/1
5 TABLET ORAL EVERY 4 HOURS PRN
Status: DISCONTINUED | OUTPATIENT
Start: 2022-06-23 | End: 2022-06-23 | Stop reason: HOSPADM

## 2022-06-23 RX ORDER — OXYCODONE HYDROCHLORIDE 5 MG/1
5 TABLET ORAL
Status: DISCONTINUED | OUTPATIENT
Start: 2022-06-23 | End: 2022-06-23 | Stop reason: HOSPADM

## 2022-06-23 RX ORDER — CEFAZOLIN SODIUM/WATER 2 G/20 ML
2 SYRINGE (ML) INTRAVENOUS
Status: DISCONTINUED | OUTPATIENT
Start: 2022-06-23 | End: 2022-06-23 | Stop reason: HOSPADM

## 2022-06-23 RX ORDER — PROPOFOL 10 MG/ML
INJECTION, EMULSION INTRAVENOUS PRN
Status: DISCONTINUED | OUTPATIENT
Start: 2022-06-23 | End: 2022-06-23

## 2022-06-23 RX ORDER — ONDANSETRON 2 MG/ML
INJECTION INTRAMUSCULAR; INTRAVENOUS PRN
Status: DISCONTINUED | OUTPATIENT
Start: 2022-06-23 | End: 2022-06-23

## 2022-06-23 RX ORDER — SODIUM CHLORIDE, SODIUM LACTATE, POTASSIUM CHLORIDE, CALCIUM CHLORIDE 600; 310; 30; 20 MG/100ML; MG/100ML; MG/100ML; MG/100ML
INJECTION, SOLUTION INTRAVENOUS CONTINUOUS
Status: DISCONTINUED | OUTPATIENT
Start: 2022-06-23 | End: 2022-06-23 | Stop reason: HOSPADM

## 2022-06-23 RX ORDER — CEFAZOLIN SODIUM/WATER 2 G/20 ML
2 SYRINGE (ML) INTRAVENOUS SEE ADMIN INSTRUCTIONS
Status: DISCONTINUED | OUTPATIENT
Start: 2022-06-23 | End: 2022-06-23 | Stop reason: HOSPADM

## 2022-06-23 RX ORDER — ACETAMINOPHEN 325 MG/1
975 TABLET ORAL EVERY 6 HOURS PRN
Qty: 50 TABLET | Refills: 0 | Status: SHIPPED | OUTPATIENT
Start: 2022-06-23 | End: 2022-09-22

## 2022-06-23 RX ORDER — MAGNESIUM HYDROXIDE 1200 MG/15ML
LIQUID ORAL PRN
Status: DISCONTINUED | OUTPATIENT
Start: 2022-06-23 | End: 2022-06-23 | Stop reason: HOSPADM

## 2022-06-23 RX ORDER — ACETAMINOPHEN 325 MG/1
975 TABLET ORAL ONCE
Status: DISCONTINUED | OUTPATIENT
Start: 2022-06-23 | End: 2022-06-23 | Stop reason: HOSPADM

## 2022-06-23 RX ORDER — KETAMINE HYDROCHLORIDE 10 MG/ML
INJECTION INTRAMUSCULAR; INTRAVENOUS PRN
Status: DISCONTINUED | OUTPATIENT
Start: 2022-06-23 | End: 2022-06-23

## 2022-06-23 RX ORDER — ONDANSETRON 2 MG/ML
4 INJECTION INTRAMUSCULAR; INTRAVENOUS EVERY 30 MIN PRN
Status: DISCONTINUED | OUTPATIENT
Start: 2022-06-23 | End: 2022-06-23 | Stop reason: HOSPADM

## 2022-06-23 RX ORDER — FENTANYL CITRATE 50 UG/ML
25 INJECTION, SOLUTION INTRAMUSCULAR; INTRAVENOUS
Status: DISCONTINUED | OUTPATIENT
Start: 2022-06-23 | End: 2022-06-23 | Stop reason: HOSPADM

## 2022-06-23 RX ORDER — IBUPROFEN 200 MG
800 TABLET ORAL ONCE
Status: DISCONTINUED | OUTPATIENT
Start: 2022-06-23 | End: 2022-06-23 | Stop reason: HOSPADM

## 2022-06-23 RX ORDER — HYDROMORPHONE HYDROCHLORIDE 1 MG/ML
0.2 INJECTION, SOLUTION INTRAMUSCULAR; INTRAVENOUS; SUBCUTANEOUS EVERY 5 MIN PRN
Status: DISCONTINUED | OUTPATIENT
Start: 2022-06-23 | End: 2022-06-23 | Stop reason: HOSPADM

## 2022-06-23 RX ORDER — GLYCOPYRROLATE 0.2 MG/ML
INJECTION, SOLUTION INTRAMUSCULAR; INTRAVENOUS PRN
Status: DISCONTINUED | OUTPATIENT
Start: 2022-06-23 | End: 2022-06-23

## 2022-06-23 RX ORDER — DEXAMETHASONE SODIUM PHOSPHATE 10 MG/ML
INJECTION, SOLUTION INTRAMUSCULAR; INTRAVENOUS PRN
Status: DISCONTINUED | OUTPATIENT
Start: 2022-06-23 | End: 2022-06-23

## 2022-06-23 RX ORDER — ACETAMINOPHEN 325 MG/1
975 TABLET ORAL ONCE
Status: COMPLETED | OUTPATIENT
Start: 2022-06-23 | End: 2022-06-23

## 2022-06-23 RX ORDER — PROPOFOL 10 MG/ML
INJECTION, EMULSION INTRAVENOUS CONTINUOUS PRN
Status: DISCONTINUED | OUTPATIENT
Start: 2022-06-23 | End: 2022-06-23

## 2022-06-23 RX ORDER — NALOXONE HYDROCHLORIDE 0.4 MG/ML
0.2 INJECTION, SOLUTION INTRAMUSCULAR; INTRAVENOUS; SUBCUTANEOUS
Status: DISCONTINUED | OUTPATIENT
Start: 2022-06-23 | End: 2022-06-23 | Stop reason: HOSPADM

## 2022-06-23 RX ORDER — SODIUM CHLORIDE, SODIUM LACTATE, POTASSIUM CHLORIDE, AND CALCIUM CHLORIDE .6; .31; .03; .02 G/100ML; G/100ML; G/100ML; G/100ML
IRRIGANT IRRIGATION PRN
Status: DISCONTINUED | OUTPATIENT
Start: 2022-06-23 | End: 2022-06-23 | Stop reason: HOSPADM

## 2022-06-23 RX ORDER — FENTANYL CITRATE 50 UG/ML
INJECTION, SOLUTION INTRAMUSCULAR; INTRAVENOUS PRN
Status: DISCONTINUED | OUTPATIENT
Start: 2022-06-23 | End: 2022-06-23

## 2022-06-23 RX ORDER — SODIUM CHLORIDE, SODIUM LACTATE, POTASSIUM CHLORIDE, CALCIUM CHLORIDE 600; 310; 30; 20 MG/100ML; MG/100ML; MG/100ML; MG/100ML
INJECTION, SOLUTION INTRAVENOUS CONTINUOUS PRN
Status: DISCONTINUED | OUTPATIENT
Start: 2022-06-23 | End: 2022-06-23

## 2022-06-23 RX ORDER — LIDOCAINE HYDROCHLORIDE 10 MG/ML
INJECTION, SOLUTION INFILTRATION; PERINEURAL PRN
Status: DISCONTINUED | OUTPATIENT
Start: 2022-06-23 | End: 2022-06-23

## 2022-06-23 RX ORDER — MEPERIDINE HYDROCHLORIDE 25 MG/ML
12.5 INJECTION INTRAMUSCULAR; INTRAVENOUS; SUBCUTANEOUS
Status: COMPLETED | OUTPATIENT
Start: 2022-06-23 | End: 2022-06-23

## 2022-06-23 RX ORDER — FENTANYL CITRATE 50 UG/ML
25 INJECTION, SOLUTION INTRAMUSCULAR; INTRAVENOUS EVERY 5 MIN PRN
Status: DISCONTINUED | OUTPATIENT
Start: 2022-06-23 | End: 2022-06-23 | Stop reason: HOSPADM

## 2022-06-23 RX ORDER — ONDANSETRON 4 MG/1
4 TABLET, ORALLY DISINTEGRATING ORAL EVERY 30 MIN PRN
Status: DISCONTINUED | OUTPATIENT
Start: 2022-06-23 | End: 2022-06-23 | Stop reason: HOSPADM

## 2022-06-23 RX ORDER — NALOXONE HYDROCHLORIDE 0.4 MG/ML
0.4 INJECTION, SOLUTION INTRAMUSCULAR; INTRAVENOUS; SUBCUTANEOUS
Status: DISCONTINUED | OUTPATIENT
Start: 2022-06-23 | End: 2022-06-23 | Stop reason: HOSPADM

## 2022-06-23 RX ORDER — OXYCODONE HYDROCHLORIDE 5 MG/1
5-10 TABLET ORAL EVERY 4 HOURS PRN
Qty: 12 TABLET | Refills: 0 | Status: SHIPPED | OUTPATIENT
Start: 2022-06-23 | End: 2022-09-22

## 2022-06-23 RX ORDER — BUPIVACAINE HYDROCHLORIDE 2.5 MG/ML
INJECTION, SOLUTION INFILTRATION; PERINEURAL PRN
Status: DISCONTINUED | OUTPATIENT
Start: 2022-06-23 | End: 2022-06-23 | Stop reason: HOSPADM

## 2022-06-23 RX ORDER — LIDOCAINE 40 MG/G
CREAM TOPICAL
Status: DISCONTINUED | OUTPATIENT
Start: 2022-06-23 | End: 2022-06-23 | Stop reason: HOSPADM

## 2022-06-23 RX ORDER — KETOROLAC TROMETHAMINE 30 MG/ML
30 INJECTION, SOLUTION INTRAMUSCULAR; INTRAVENOUS EVERY 6 HOURS PRN
Status: DISCONTINUED | OUTPATIENT
Start: 2022-06-23 | End: 2022-06-23 | Stop reason: HOSPADM

## 2022-06-23 RX ORDER — MAGNESIUM SULFATE 4 G/50ML
4 INJECTION INTRAVENOUS ONCE
Status: COMPLETED | OUTPATIENT
Start: 2022-06-23 | End: 2022-06-23

## 2022-06-23 RX ORDER — IBUPROFEN 200 MG
800 TABLET ORAL ONCE
Status: DISCONTINUED | OUTPATIENT
Start: 2022-06-23 | End: 2022-06-23

## 2022-06-23 RX ADMIN — HYDROMORPHONE HYDROCHLORIDE 0.2 MG: 1 INJECTION, SOLUTION INTRAMUSCULAR; INTRAVENOUS; SUBCUTANEOUS at 11:55

## 2022-06-23 RX ADMIN — FENTANYL CITRATE 25 MCG: 50 INJECTION, SOLUTION INTRAMUSCULAR; INTRAVENOUS at 12:24

## 2022-06-23 RX ADMIN — SUGAMMADEX 200 MG: 100 INJECTION, SOLUTION INTRAVENOUS at 10:45

## 2022-06-23 RX ADMIN — FENTANYL CITRATE 25 MCG: 50 INJECTION, SOLUTION INTRAMUSCULAR; INTRAVENOUS at 12:19

## 2022-06-23 RX ADMIN — LIDOCAINE HYDROCHLORIDE 5 ML: 10 INJECTION, SOLUTION INFILTRATION; PERINEURAL at 09:15

## 2022-06-23 RX ADMIN — SODIUM CHLORIDE, POTASSIUM CHLORIDE, SODIUM LACTATE AND CALCIUM CHLORIDE: 600; 310; 30; 20 INJECTION, SOLUTION INTRAVENOUS at 09:14

## 2022-06-23 RX ADMIN — FENTANYL CITRATE 25 MCG: 50 INJECTION, SOLUTION INTRAMUSCULAR; INTRAVENOUS at 14:34

## 2022-06-23 RX ADMIN — FENTANYL CITRATE 25 MCG: 50 INJECTION, SOLUTION INTRAMUSCULAR; INTRAVENOUS at 16:42

## 2022-06-23 RX ADMIN — KETAMINE HYDROCHLORIDE 30 MG: 10 INJECTION, SOLUTION INTRAMUSCULAR; INTRAVENOUS at 09:46

## 2022-06-23 RX ADMIN — SODIUM CHLORIDE, POTASSIUM CHLORIDE, SODIUM LACTATE AND CALCIUM CHLORIDE: 600; 310; 30; 20 INJECTION, SOLUTION INTRAVENOUS at 08:15

## 2022-06-23 RX ADMIN — ONDANSETRON 2 MG: 2 INJECTION INTRAMUSCULAR; INTRAVENOUS at 09:33

## 2022-06-23 RX ADMIN — DEXAMETHASONE SODIUM PHOSPHATE 10 MG: 10 INJECTION, SOLUTION INTRAMUSCULAR; INTRAVENOUS at 09:20

## 2022-06-23 RX ADMIN — ACETAMINOPHEN 975 MG: 325 TABLET, FILM COATED ORAL at 08:09

## 2022-06-23 RX ADMIN — HYDROMORPHONE HYDROCHLORIDE 0.2 MG: 1 INJECTION, SOLUTION INTRAMUSCULAR; INTRAVENOUS; SUBCUTANEOUS at 11:34

## 2022-06-23 RX ADMIN — HYDROMORPHONE HYDROCHLORIDE 0.2 MG: 1 INJECTION, SOLUTION INTRAMUSCULAR; INTRAVENOUS; SUBCUTANEOUS at 10:10

## 2022-06-23 RX ADMIN — MEPERIDINE HYDROCHLORIDE 12.5 MG: 25 INJECTION INTRAMUSCULAR; INTRAVENOUS; SUBCUTANEOUS at 12:00

## 2022-06-23 RX ADMIN — FENTANYL CITRATE 25 MCG: 50 INJECTION, SOLUTION INTRAMUSCULAR; INTRAVENOUS at 14:18

## 2022-06-23 RX ADMIN — PROPOFOL 200 MCG/KG/MIN: 10 INJECTION, EMULSION INTRAVENOUS at 09:15

## 2022-06-23 RX ADMIN — OXYCODONE HYDROCHLORIDE 5 MG: 5 TABLET ORAL at 12:03

## 2022-06-23 RX ADMIN — MAGNESIUM SULFATE HEPTAHYDRATE 4 G: 4 INJECTION, SOLUTION INTRAVENOUS at 08:52

## 2022-06-23 RX ADMIN — HYDROMORPHONE HYDROCHLORIDE 0.2 MG: 1 INJECTION, SOLUTION INTRAMUSCULAR; INTRAVENOUS; SUBCUTANEOUS at 11:50

## 2022-06-23 RX ADMIN — ONDANSETRON 2 MG: 2 INJECTION INTRAMUSCULAR; INTRAVENOUS at 09:20

## 2022-06-23 RX ADMIN — ONDANSETRON 4 MG: 2 INJECTION INTRAMUSCULAR; INTRAVENOUS at 13:01

## 2022-06-23 RX ADMIN — SODIUM CHLORIDE, POTASSIUM CHLORIDE, SODIUM LACTATE AND CALCIUM CHLORIDE: 600; 310; 30; 20 INJECTION, SOLUTION INTRAVENOUS at 10:06

## 2022-06-23 RX ADMIN — FENTANYL CITRATE 50 MCG: 50 INJECTION, SOLUTION INTRAMUSCULAR; INTRAVENOUS at 09:20

## 2022-06-23 RX ADMIN — HYDROMORPHONE HYDROCHLORIDE 0.2 MG: 1 INJECTION, SOLUTION INTRAMUSCULAR; INTRAVENOUS; SUBCUTANEOUS at 11:45

## 2022-06-23 RX ADMIN — PROPOFOL 70 MG: 10 INJECTION, EMULSION INTRAVENOUS at 10:14

## 2022-06-23 RX ADMIN — OXYCODONE HYDROCHLORIDE 5 MG: 5 TABLET ORAL at 17:07

## 2022-06-23 RX ADMIN — PROPOFOL 200 MG: 10 INJECTION, EMULSION INTRAVENOUS at 09:20

## 2022-06-23 RX ADMIN — MEPERIDINE HYDROCHLORIDE 12.5 MG: 25 INJECTION INTRAMUSCULAR; INTRAVENOUS; SUBCUTANEOUS at 12:16

## 2022-06-23 RX ADMIN — ROCURONIUM BROMIDE 50 MG: 50 INJECTION, SOLUTION INTRAVENOUS at 09:20

## 2022-06-23 RX ADMIN — HYDROMORPHONE HYDROCHLORIDE 0.3 MG: 1 INJECTION, SOLUTION INTRAMUSCULAR; INTRAVENOUS; SUBCUTANEOUS at 10:01

## 2022-06-23 RX ADMIN — HYDROMORPHONE HYDROCHLORIDE 0.2 MG: 1 INJECTION, SOLUTION INTRAMUSCULAR; INTRAVENOUS; SUBCUTANEOUS at 11:39

## 2022-06-23 RX ADMIN — FENTANYL CITRATE 50 MCG: 50 INJECTION, SOLUTION INTRAMUSCULAR; INTRAVENOUS at 09:45

## 2022-06-23 RX ADMIN — ROCURONIUM BROMIDE 20 MG: 50 INJECTION, SOLUTION INTRAVENOUS at 10:13

## 2022-06-23 RX ADMIN — FENTANYL CITRATE 25 MCG: 50 INJECTION, SOLUTION INTRAMUSCULAR; INTRAVENOUS at 11:23

## 2022-06-23 RX ADMIN — HYDROMORPHONE HYDROCHLORIDE 0.5 MG: 1 INJECTION, SOLUTION INTRAMUSCULAR; INTRAVENOUS; SUBCUTANEOUS at 10:17

## 2022-06-23 RX ADMIN — FENTANYL CITRATE 25 MCG: 50 INJECTION, SOLUTION INTRAMUSCULAR; INTRAVENOUS at 11:31

## 2022-06-23 RX ADMIN — GLYCOPYRROLATE 0.2 MG: 0.2 INJECTION, SOLUTION INTRAMUSCULAR; INTRAVENOUS at 09:47

## 2022-06-23 RX ADMIN — KETOROLAC TROMETHAMINE 30 MG: 30 INJECTION, SOLUTION INTRAMUSCULAR at 17:08

## 2022-06-23 RX ADMIN — MIDAZOLAM 2 MG: 1 INJECTION INTRAMUSCULAR; INTRAVENOUS at 09:14

## 2022-06-23 RX ADMIN — GLYCOPYRROLATE 0.2 MG: 0.2 INJECTION, SOLUTION INTRAMUSCULAR; INTRAVENOUS at 09:20

## 2022-06-23 NOTE — ANESTHESIA PROCEDURE NOTES
Airway         Procedure Start/Stop Times: 6/23/2022 9:23 AM  Staff -        Anesthesiologist:  Easton Mckeon MD       CRNA: Allan Sethi APRN CRNA       Performed By: CRNAIndications and Patient Condition       Indications for airway management: denise-procedural       Induction type:intravenous       Mask difficulty assessment: 2 - vent by mask + OA or adjuvant +/- NMBA    Final Airway Details       Final airway type: endotracheal airway       Successful airway: ETT - single  Endotracheal Airway Details        ETT size (mm): 7.0       Cuffed: yes       Successful intubation technique: direct laryngoscopy       DL Blade Type: MAC 3       Grade View of Cords: 1       Adjucts: stylet and tooth guard       Position: Right       Measured from: gums/teeth       Secured at (cm): 21       Bite block used: None    Post intubation assessment        Placement verified by: capnometry, equal breath sounds and chest rise        Number of attempts at approach: 1       Number of other approaches attempted: 0       Secured with: silk tape       Ease of procedure: easy       Dentition: Intact and Unchanged    Medication(s) Administered   Medication Administration Time: 6/23/2022 9:23 AM

## 2022-06-23 NOTE — ANESTHESIA PREPROCEDURE EVALUATION
Anesthesia Pre-Procedure Evaluation    Patient: Katiuska De Jesus   MRN: [id number] : 1985        Procedure : Procedure(s):  LAPAROSCOPIC ASSISTED VAGINAL HYSTERECTOMY  BILATERAL SALPINGECTOMY          Past Medical History:   Diagnosis Date     Depression      Fatigue      Hypothyroid      Hypothyroidism      IBS (irritable bowel syndrome)      Major depression, single episode 2022     Migraine       Past Surgical History:   Procedure Laterality Date     NO HISTORY OF SURGERY        Allergies   Allergen Reactions     Gluten Meal      Ibuprofen Sodium Cramps and GI Disturbance      Social History     Tobacco Use     Smoking status: Never Smoker     Smokeless tobacco: Never Used     Tobacco comment: smokers in home   Substance Use Topics     Alcohol use: Not Currently     Comment: Alcoholic Drinks/day: rarely      Wt Readings from Last 1 Encounters:   22 91.1 kg (200 lb 12.8 oz)        Anesthesia Evaluation   Pt has had prior anesthetic. Type: General.        ROS/MED HX  ENT/Pulmonary:  - neg pulmonary ROS     Neurologic:  - neg neurologic ROS     Cardiovascular:  - neg cardiovascular ROS     METS/Exercise Tolerance:     Hematologic:  - neg hematologic  ROS     Musculoskeletal:  - neg musculoskeletal ROS     GI/Hepatic:  - neg GI/hepatic ROS     Renal/Genitourinary:  - neg Renal ROS     Endo:     (+) thyroid problem, hypothyroidism, Obesity,     Psychiatric/Substance Use:  - neg psychiatric ROS     Infectious Disease:       Malignancy:       Other:            Physical Exam    Airway  airway exam normal      Mallampati: I   TM distance: > 3 FB   Neck ROM: full   Mouth opening: > 3 cm    Respiratory Devices and Support         Dental  no notable dental history         Cardiovascular   cardiovascular exam normal       Rhythm and rate: regular and normal     Pulmonary   pulmonary exam normal        breath sounds clear to auscultation           OUTSIDE LABS:  CBC:   Lab Results   Component Value Date     WBC 4.4 06/23/2022    WBC 3.9 (L) 06/17/2022    HGB 12.8 06/23/2022    HGB 13.3 06/17/2022    HCT 37.3 06/23/2022    HCT 38.8 06/17/2022     (L) 06/23/2022     (L) 06/17/2022     BMP:   Lab Results   Component Value Date     06/17/2022     04/06/2022    POTASSIUM 4.1 06/17/2022    POTASSIUM 4.0 04/06/2022    CHLORIDE 105 06/17/2022    CHLORIDE 105 04/06/2022    CO2 25 06/17/2022    CO2 24 04/06/2022    BUN 13 06/17/2022    BUN 14 04/06/2022    CR 0.80 06/17/2022    CR 0.77 04/06/2022    GLC 81 06/17/2022    GLC 81 04/06/2022     COAGS:   Lab Results   Component Value Date    PTT 30 10/22/2015    INR 0.98 10/22/2015     POC:   Lab Results   Component Value Date    HCG Negative 06/23/2022     HEPATIC:   Lab Results   Component Value Date    ALBUMIN 3.6 04/06/2022    PROTTOTAL 7.4 04/06/2022    ALT 24 04/06/2022    AST 19 04/06/2022    ALKPHOS 63 04/06/2022    BILITOTAL 0.3 04/06/2022     OTHER:   Lab Results   Component Value Date    A1C 5.1 10/18/2019    MIGUELINA 9.3 06/17/2022    MAG 2.1 04/06/2022    TSH 1.92 04/06/2022    CRP <0.1 04/06/2022    SED 13 09/28/2020       Anesthesia Plan    ASA Status:  2   NPO Status:  NPO Appropriate    Anesthesia Type: General.     - Airway: ETT   Induction: Propofol, Intravenous.   Maintenance: Balanced.        Consents    Anesthesia Plan(s) and associated risks, benefits, and realistic alternatives discussed. Questions answered and patient/representative(s) expressed understanding.    - Discussed:     - Discussed with:  Patient, Parent (Mother and/or Father)         Postoperative Care    Pain management: IV analgesics, Oral pain medications, Multi-modal analgesia.   PONV prophylaxis: Ondansetron (or other 5HT-3), Dexamethasone or Solumedrol     Comments:                Easton Mckeon MD

## 2022-06-23 NOTE — ANESTHESIA POSTPROCEDURE EVALUATION
Patient: Katiuska De Jesus    Procedure: Procedure(s):  LAPAROSCOPIC ASSISTED VAGINAL HYSTERECTOMY  BILATERAL SALPINGECTOMY       Anesthesia Type:  General    Note:  Disposition: Outpatient   Postop Pain Control: Uneventful            Sign Out: Well controlled pain   PONV: No   Neuro/Psych: Uneventful            Sign Out: Acceptable/Baseline neuro status   Airway/Respiratory: Uneventful            Sign Out: Acceptable/Baseline resp. status   CV/Hemodynamics: Uneventful            Sign Out: Acceptable CV status; No obvious hypovolemia; No obvious fluid overload   Other NRE: NONE   DID A NON-ROUTINE EVENT OCCUR? No           Last vitals:  Vitals Value Taken Time   /55 06/23/22 1230   Temp 37.1  C (98.7  F) 06/23/22 1200   Pulse 95 06/23/22 1234   Resp 18 06/23/22 1230   SpO2 98 % 06/23/22 1234   Vitals shown include unvalidated device data.    Electronically Signed By: Easton Mckeon MD  June 23, 2022  12:44 PM

## 2022-06-23 NOTE — ANESTHESIA CARE TRANSFER NOTE
Patient: Katiuska De Jesus    Procedure: Procedure(s):  LAPAROSCOPIC ASSISTED VAGINAL HYSTERECTOMY  BILATERAL SALPINGECTOMY       Diagnosis: Dysmenorrhea [N94.6]  Menorrhagia [N92.0]  Diagnosis Additional Information: No value filed.    Anesthesia Type:   General     Note:    Oropharynx: oropharynx clear of all foreign objects  Level of Consciousness: drowsy  Oxygen Supplementation: face mask  Level of Supplemental Oxygen (L/min / FiO2): 10  Independent Airway: airway patency satisfactory and stable  Dentition: dentition unchanged  Vital Signs Stable: post-procedure vital signs reviewed and stable  Report to RN Given: handoff report given  Patient transferred to: PACU    Handoff Report: Identifed the Patient, Identified the Reponsible Provider, Reviewed the pertinent medical history, Discussed the surgical course, Reviewed Intra-OP anesthesia mangement and issues during anesthesia, Set expectations for post-procedure period and Allowed opportunity for questions and acknowledgement of understanding      Vitals:  Vitals Value Taken Time   /56 06/23/22 1107   Temp 36.7  C (98.1  F) 06/23/22 1105   Pulse 100 06/23/22 1114   Resp 19 06/23/22 1114   SpO2 100 % 06/23/22 1114   Vitals shown include unvalidated device data.    Electronically Signed By: DEMETRIUS Juárez CRNA  June 23, 2022  11:15 AM

## 2022-06-23 NOTE — OP NOTE
Operative Note- Gynecologic      Pre-Op Dx: Dysmenorrhea. Menometrorrhagia    Post-Op Dx: The Same.     EBL: 30cc    Anesthesia: GETA     Complications:None apparent     Procedure: LAVH and bilateral salpingectomy     Surgeon: DO Chelsea, JOEOG    Asst: Sue Kan    Findings: Nml uterus. Nml ovaries bilaterally. Nml tubes. Nml omentum/liver edge.     Procedure:Consent reviewed prior to taking to the OR.     Placed in dorsal lithotomy   Time out was performed. Area was prepped and draped.   Monk catheter was placed as was speculum and the anterior lip of cervix was grasped and a uterine maniupulator- CAMELIA was able to be advanced into the uterus.     Speculum removed and gloves exchanged.   An umbilical incision was made after injection of 10cc of Marcaine.   Veress needle placed and hanging drop test confirmed abd placement. Opening pressure was 2mm Hg.   Abdomen was insufflated.   L and R lower port sites placed after anesthetizing with marcaine and placed under direct visualization.       Ligasure was used to dissect and transect the mesosalpinx and utero-ovarian ligaments. We were able to create a bladder flap as well without difficulty. Several bites of tissue were cauterized and cut on the cardinal lig complex. We were able to get down to the Uterine A on both sides, cauterize and cut and assure hemostasis.       Cervicovaginal junction was cauterized with Monopolar scissors circumferentially.       Attention was then turned below to the vaginal portion of the procedure.   Allis clamps used to grasp the apex of vagina and this was closed with interrupted 0-Vicryl suture.      Gloves exchanged and abdomen re-insufflated. All pedicles inspected for hemostasis. Surgicell powder was placed over the pedicles.   After assuring hemostasis, the instruments were removed as was the ports.     Skin was closed with 4-0 monocryl.   Sponge, lap and needle counts were correct x 2.   Monk catheter was draining clear  yellow urine.     Rayna Tran, DO, FACOG

## 2022-06-23 NOTE — H&P
OK to proceed with LAVH and bilateral salpingectomy. Consent obtained.      No acute changes.        Rayna Tran DO, FACOG  6/23/2022 9:13 AM

## 2022-06-24 LAB
PATH REPORT.COMMENTS IMP SPEC: NORMAL
PATH REPORT.COMMENTS IMP SPEC: NORMAL
PATH REPORT.FINAL DX SPEC: NORMAL
PATH REPORT.GROSS SPEC: NORMAL
PATH REPORT.MICROSCOPIC SPEC OTHER STN: NORMAL
PATH REPORT.RELEVANT HX SPEC: NORMAL
PHOTO IMAGE: NORMAL

## 2022-07-13 NOTE — TELEPHONE ENCOUNTER
REFERRAL INFORMATION:    Referring Provider:  Kwasi    Referring Clinic:  [facility]    Reason for Visit/Diagnosis: ab pain     FUTURE VISIT INFORMATION    Appointment Date: 10.14.22    Appointment Time: 8 AM     NOTES STATUS DETAILS   OFFICE NOTE from Referring Provider Internal 5.5.22, 3.3.30 [facility] Villalpando   OFFICE NOTE from Other Specialist Received 5.12.22 NIR Miller   HOSPITAL DISCHARGE SUMMARY/  ED VISITS N/A    OPERATIVE REPORT N/A    MEDICATION LIST Internal         ENDOSCOPY  N/A    COLONOSCOPY Internal 6.22.20   ERCP N/A    EUS N/A    STOOL TESTING N/A    PERTINENT LABS Internal    PATHOLOGY REPORTS (RELATED) Internal 6.2020   IMAGING (CT, MRI, EGD, MRCP, Small Bowel Follow Through/SBT, MR/CT Enterography) Internal 5.5.22 XR ab  4.6.22 CT ab pelvis

## 2022-08-25 DIAGNOSIS — M47.26 OSTEOARTHRITIS OF SPINE WITH RADICULOPATHY, LUMBAR REGION: ICD-10-CM

## 2022-08-26 RX ORDER — GABAPENTIN 300 MG/1
300 CAPSULE ORAL 2 TIMES DAILY PRN
Qty: 90 CAPSULE | Refills: 1 | Status: SHIPPED | OUTPATIENT
Start: 2022-08-26 | End: 2022-09-22

## 2022-09-07 ENCOUNTER — TELEPHONE (OUTPATIENT)
Dept: PULMONOLOGY | Facility: CLINIC | Age: 37
End: 2022-09-07

## 2022-09-07 NOTE — TELEPHONE ENCOUNTER
[facility] sent my chart message letting pt know that the provider is unavailable and will need to reschedule.     Reschedule next available with Leonides or gen DE LEON

## 2022-09-09 ENCOUNTER — TELEPHONE (OUTPATIENT)
Dept: GASTROENTEROLOGY | Facility: CLINIC | Age: 37
End: 2022-09-09

## 2022-09-09 NOTE — TELEPHONE ENCOUNTER
[facility] sent my chart message letting pt know that we are canceling their appointment due to the provider being unavailable.     Reschedule with Leonides next available

## 2022-09-15 ENCOUNTER — OFFICE VISIT (OUTPATIENT)
Dept: ORTHOPEDICS | Facility: CLINIC | Age: 37
End: 2022-09-15

## 2022-09-15 ENCOUNTER — ANCILLARY PROCEDURE (OUTPATIENT)
Dept: GENERAL RADIOLOGY | Facility: CLINIC | Age: 37
End: 2022-09-15
Attending: PEDIATRICS
Payer: COMMERCIAL

## 2022-09-15 VITALS
BODY MASS INDEX: 33.28 KG/M2 | HEART RATE: 91 BPM | WEIGHT: 200 LBS | SYSTOLIC BLOOD PRESSURE: 119 MMHG | DIASTOLIC BLOOD PRESSURE: 80 MMHG

## 2022-09-15 DIAGNOSIS — R20.0 NUMBNESS AND TINGLING IN LEFT HAND: ICD-10-CM

## 2022-09-15 DIAGNOSIS — M25.532 LEFT WRIST PAIN: Primary | ICD-10-CM

## 2022-09-15 DIAGNOSIS — R20.2 NUMBNESS AND TINGLING IN LEFT HAND: ICD-10-CM

## 2022-09-15 DIAGNOSIS — M25.532 LEFT WRIST PAIN: ICD-10-CM

## 2022-09-15 DIAGNOSIS — G56.22 ULNAR NEUROPATHY OF LEFT UPPER EXTREMITY: ICD-10-CM

## 2022-09-15 PROCEDURE — 73110 X-RAY EXAM OF WRIST: CPT | Mod: TC | Performed by: RADIOLOGY

## 2022-09-15 PROCEDURE — 99203 OFFICE O/P NEW LOW 30 MIN: CPT | Performed by: PEDIATRICS

## 2022-09-15 NOTE — LETTER
9/15/2022         RE: Katiuska De Jesus  [address]  [place] [address]        Dear Colleague,    Thank you for referring your patient, Katiuska De Jesus, to the [facility]. Please see a copy of my visit note below.    ASSESSMENT & PLAN    Katiuska was seen today for pain.    Diagnoses and all orders for this visit:    Left wrist pain  -     XR Wrist Left G/E 3 Views; Future  -     Occupational Therapy Referral; Future  -     Wrist/Arm/Hand Supplies Order for DME - ONLY FOR DME    Numbness and tingling in left hand  -     Occupational Therapy Referral; Future  -     Wrist/Arm/Hand Supplies Order for DME - ONLY FOR DME    Ulnar neuropathy of left upper extremity  -     Occupational Therapy Referral; Future  -     Wrist/Arm/Hand Supplies Order for DME - ONLY FOR DME      This issue is acute on chronic and Unchanged.    Discussed anatomy and pathophysiology of carpal tunnel. Discussed avoidance of exacerbating activities and use of braces.  Also discussed formal occupational hand therapy, potential referral for evaluation with EMG to evaluate severity.  Would consider referral pending course with treatment.    Likely some component of ulnar neuropathy as well.    Plan:  - Today's Plan of Care:  Discussed wrist bracing, supportive care for elbow (limited leaning on the elbow, wrap elbow overnight)    Rehab: Occupational Therapy: [facility] - [phone]  Discussed activity considerations and other supportive care including Ice/Heat, OTC and other topical medications as needed.    -We also discussed other future treatment options:  EMG if symptoms do not improve  Discussed possible US guided carpal tunnel injection  Referral to Hand Surgery    Follow Up: 1 month    Concerning signs and symptoms were reviewed.  The patient expressed understanding of this management plan and all questions were answered at this time.    Petrona Pruitt MD [facility]  Sports Medicine  Physician  [facility]      -----  Chief Complaint   Patient presents with     Left Wrist - Pain       SUBJECTIVE  Katiuska De Jesus is a/an 36 year old female who is seen as a self referral for evaluation of left wrist pain.     The patient is seen by themselves.  The patient is Right handed    Onset: 2 week(s) ago. Reports insidious onset without acute precipitating event.  Location of Pain: left wrist pain; between radius and ulna, can radiate to the dorsum of the hand and can radiate up to the olecranon process  Worsened by: typing, resting on the elbow, peeling, cutting, grabbing, pulling, twisting  Better with: resting,icing, positioning, wrist bracing (but can exacerbate numbness/tingling)  Treatments tried: rest/activity avoidance, ice, Tylenol and casting/splinting/bracing  Associated symptoms: swelling, numbness, tingling, weakness of left hand/wrist and feeling of instability    Orthopedic/Surgical history: YES - Date: chronic on/off similar left wrist pain  Social History/Occupation: works at a desk     No family history pertinent to patient's problem today.    REVIEW OF SYSTEMS:  Review of Systems  Skin: no bruising, no swelling  Musculoskeletal: as above  Neurologic: no numbness, paresthesias  Remainder of review of systems is negative including constitutional, CV, pulmonary, GI, except as noted in HPI or medical history.    OBJECTIVE:  /80   Pulse 91   Wt 90.7 kg (200 lb)   BMI 33.28 kg/m     General: healthy, alert and in no distress  HEENT: no scleral icterus or conjunctival erythema  Skin: no suspicious lesions or rash. No jaundice.  CV: distal perfusion intact  Resp: normal respiratory effort without conversational dyspnea   Psych: normal mood and affect  Gait: normal steady gait with appropriate coordination and balance  Neuro: Normal light sensory exam of upper extremity    Bilateral Elbow exam:  Inspection:     no ecchymosis       no edema or effusion    Tender:      lateral epicondyle left       medial epicondyle left    Non-Tender:      remainder of the elbow bilaterally    ROM:      full with flexion, extension, forearm supination and pronation bilateral    Strength:     flexion 5/5 bilateral       extension 5/5 bilateral       forearm supination 5/5 bilateral       forearm pronation 5/5 bilateral    Special Tests:      negative    Sensation:     intact throughout hand       intact throughout forearm    Bilateral Wrist and Hand exam    Inspection:       No swelling, bruising or deformity bilateral    Tender:       Volar wrist    Non Tender:       Remainder of the Wrist and Hand bilateral    ROM:       Full and symmetric active and passive range of motion of the forearm, wrist and digits bilateral    Strength:       5/5 strength in the muscles of the hand, wrist and forearm bilateral    Special Tests:        positive (+) Tinel's test left and       positive (+) Phalen's test left    Neurovascular:       2+ radial pulses bilaterally with brisk capillary refill and      normal sensation to light touch in the radial, median and ulnar nerve distributions    RADIOLOGY:  I independently ordered, visualized and reviewed these images with the patient  3 XR views of left wrist reviewed: no acute bony abnormality, no significant degenerative change  - will follow official read    Review of the result(s) of each unique test - XR             Again, thank you for allowing me to participate in the care of your patient.        Sincerely,        Petrona Pruitt MD

## 2022-09-15 NOTE — PROGRESS NOTES
ASSESSMENT & PLAN    Katiuska was seen today for pain.    Diagnoses and all orders for this visit:    Left wrist pain  -     XR Wrist Left G/E 3 Views; Future  -     Occupational Therapy Referral; Future  -     Wrist/Arm/Hand Supplies Order for DME - ONLY FOR DME    Numbness and tingling in left hand  -     Occupational Therapy Referral; Future  -     Wrist/Arm/Hand Supplies Order for DME - ONLY FOR DME    Ulnar neuropathy of left upper extremity  -     Occupational Therapy Referral; Future  -     Wrist/Arm/Hand Supplies Order for DME - ONLY FOR DME      This issue is acute on chronic and Unchanged.    Discussed anatomy and pathophysiology of carpal tunnel. Discussed avoidance of exacerbating activities and use of braces.  Also discussed formal occupational hand therapy, potential referral for evaluation with EMG to evaluate severity.  Would consider referral pending course with treatment.    Likely some component of ulnar neuropathy as well.    Plan:  - Today's Plan of Care:  Discussed wrist bracing, supportive care for elbow (limited leaning on the elbow, wrap elbow overnight)    Rehab: Occupational Therapy: [facility] - [phone]  Discussed activity considerations and other supportive care including Ice/Heat, OTC and other topical medications as needed.    -We also discussed other future treatment options:  EMG if symptoms do not improve  Discussed possible US guided carpal tunnel injection  Referral to Hand Surgery    Follow Up: 1 month    Concerning signs and symptoms were reviewed.  The patient expressed understanding of this management plan and all questions were answered at this time.    Petrona Pruitt MD [facility]  Sports Medicine Physician  [facility]      -----  Chief Complaint   Patient presents with     Left Wrist - Pain       SUBJECTIVE  Katiuska De Jesus is a/an 36 year old female who is seen as a self referral for evaluation of left wrist pain.     The patient is seen by  themselves.  The patient is Right handed    Onset: 2 week(s) ago. Reports insidious onset without acute precipitating event.  Location of Pain: left wrist pain; between radius and ulna, can radiate to the dorsum of the hand and can radiate up to the olecranon process  Worsened by: typing, resting on the elbow, peeling, cutting, grabbing, pulling, twisting  Better with: resting,icing, positioning, wrist bracing (but can exacerbate numbness/tingling)  Treatments tried: rest/activity avoidance, ice, Tylenol and casting/splinting/bracing  Associated symptoms: swelling, numbness, tingling, weakness of left hand/wrist and feeling of instability    Orthopedic/Surgical history: YES - Date: chronic on/off similar left wrist pain  Social History/Occupation: works at a desk     No family history pertinent to patient's problem today.    REVIEW OF SYSTEMS:  Review of Systems  Skin: no bruising, no swelling  Musculoskeletal: as above  Neurologic: no numbness, paresthesias  Remainder of review of systems is negative including constitutional, CV, pulmonary, GI, except as noted in HPI or medical history.    OBJECTIVE:  /80   Pulse 91   Wt 90.7 kg (200 lb)   BMI 33.28 kg/m     General: healthy, alert and in no distress  HEENT: no scleral icterus or conjunctival erythema  Skin: no suspicious lesions or rash. No jaundice.  CV: distal perfusion intact  Resp: normal respiratory effort without conversational dyspnea   Psych: normal mood and affect  Gait: normal steady gait with appropriate coordination and balance  Neuro: Normal light sensory exam of upper extremity    Bilateral Elbow exam:  Inspection:     no ecchymosis       no edema or effusion    Tender:     lateral epicondyle left       medial epicondyle left    Non-Tender:      remainder of the elbow bilaterally    ROM:      full with flexion, extension, forearm supination and pronation bilateral    Strength:     flexion 5/5 bilateral       extension 5/5 bilateral        forearm supination 5/5 bilateral       forearm pronation 5/5 bilateral    Special Tests:      negative    Sensation:     intact throughout hand       intact throughout forearm    Bilateral Wrist and Hand exam    Inspection:       No swelling, bruising or deformity bilateral    Tender:       Volar wrist    Non Tender:       Remainder of the Wrist and Hand bilateral    ROM:       Full and symmetric active and passive range of motion of the forearm, wrist and digits bilateral    Strength:       5/5 strength in the muscles of the hand, wrist and forearm bilateral    Special Tests:        positive (+) Tinel's test left and       positive (+) Phalen's test left    Neurovascular:       2+ radial pulses bilaterally with brisk capillary refill and      normal sensation to light touch in the radial, median and ulnar nerve distributions    RADIOLOGY:  I independently ordered, visualized and reviewed these images with the patient  3 XR views of left wrist reviewed: no acute bony abnormality, no significant degenerative change  - will follow official read    Review of the result(s) of each unique test - XR

## 2022-09-22 ENCOUNTER — OFFICE VISIT (OUTPATIENT)
Dept: INTERNAL MEDICINE | Facility: CLINIC | Age: 37
End: 2022-09-22
Payer: COMMERCIAL

## 2022-09-22 ENCOUNTER — TELEPHONE (OUTPATIENT)
Dept: INTERNAL MEDICINE | Facility: CLINIC | Age: 37
End: 2022-09-22

## 2022-09-22 VITALS
WEIGHT: 192.6 LBS | SYSTOLIC BLOOD PRESSURE: 110 MMHG | HEIGHT: 65 IN | HEART RATE: 75 BPM | TEMPERATURE: 98.2 F | DIASTOLIC BLOOD PRESSURE: 70 MMHG | BODY MASS INDEX: 32.09 KG/M2 | OXYGEN SATURATION: 97 %

## 2022-09-22 DIAGNOSIS — E66.811 CLASS 1 DRUG-INDUCED OBESITY WITHOUT SERIOUS COMORBIDITY WITH BODY MASS INDEX (BMI) OF 32.0 TO 32.9 IN ADULT: ICD-10-CM

## 2022-09-22 DIAGNOSIS — E66.1 CLASS 1 DRUG-INDUCED OBESITY WITHOUT SERIOUS COMORBIDITY WITH BODY MASS INDEX (BMI) OF 32.0 TO 32.9 IN ADULT: ICD-10-CM

## 2022-09-22 DIAGNOSIS — F41.9 ANXIETY: ICD-10-CM

## 2022-09-22 DIAGNOSIS — F32.0 MILD MAJOR DEPRESSION (H): ICD-10-CM

## 2022-09-22 DIAGNOSIS — Z00.00 ANNUAL PHYSICAL EXAM: ICD-10-CM

## 2022-09-22 DIAGNOSIS — E03.9 HYPOTHYROIDISM, UNSPECIFIED TYPE: ICD-10-CM

## 2022-09-22 DIAGNOSIS — M47.26 OSTEOARTHRITIS OF SPINE WITH RADICULOPATHY, LUMBAR REGION: ICD-10-CM

## 2022-09-22 DIAGNOSIS — R53.83 OTHER FATIGUE: Primary | ICD-10-CM

## 2022-09-22 PROCEDURE — 90471 IMMUNIZATION ADMIN: CPT | Performed by: NURSE PRACTITIONER

## 2022-09-22 PROCEDURE — 0124A COVID-19,PF,PFIZER BOOSTER BIVALENT: CPT | Performed by: NURSE PRACTITIONER

## 2022-09-22 PROCEDURE — 99214 OFFICE O/P EST MOD 30 MIN: CPT | Mod: 25 | Performed by: NURSE PRACTITIONER

## 2022-09-22 PROCEDURE — 99395 PREV VISIT EST AGE 18-39: CPT | Mod: 25 | Performed by: NURSE PRACTITIONER

## 2022-09-22 PROCEDURE — 90686 IIV4 VACC NO PRSV 0.5 ML IM: CPT | Performed by: NURSE PRACTITIONER

## 2022-09-22 PROCEDURE — 91312 COVID-19,PF,PFIZER BOOSTER BIVALENT: CPT | Performed by: NURSE PRACTITIONER

## 2022-09-22 RX ORDER — GABAPENTIN 300 MG/1
300 CAPSULE ORAL
Qty: 90 CAPSULE | Refills: 1 | Status: SHIPPED | OUTPATIENT
Start: 2022-09-22 | End: 2023-04-05

## 2022-09-22 RX ORDER — AMITRIPTYLINE HYDROCHLORIDE 10 MG/1
5 TABLET ORAL AT BEDTIME
Qty: 90 TABLET | Refills: 1 | Status: SHIPPED | OUTPATIENT
Start: 2022-09-22 | End: 2023-09-28

## 2022-09-22 ASSESSMENT — PATIENT HEALTH QUESTIONNAIRE - PHQ9
SUM OF ALL RESPONSES TO PHQ QUESTIONS 1-9: 10
SUM OF ALL RESPONSES TO PHQ QUESTIONS 1-9: 10
10. IF YOU CHECKED OFF ANY PROBLEMS, HOW DIFFICULT HAVE THESE PROBLEMS MADE IT FOR YOU TO DO YOUR WORK, TAKE CARE OF THINGS AT HOME, OR GET ALONG WITH OTHER PEOPLE: VERY DIFFICULT

## 2022-09-22 ASSESSMENT — PAIN SCALES - GENERAL: PAINLEVEL: SEVERE PAIN (6)

## 2022-09-22 NOTE — TELEPHONE ENCOUNTER
Pharmacy called regarding Sertaline prescription that says 50mg and 100mg.    Writer clarified with Nadya Villalpando NP that pt decreased dosage and is now taking 50mg.     Relayed to pharmacist.      Medication dosage corrected in med list

## 2022-09-22 NOTE — PROGRESS NOTES
SUBJECTIVE:   CC: Enedelia is an 36 year old who presents for preventive health visit.     {  Patient has been advised of split billing requirements and indicates understanding: Yes  Healthy Habits:     Taking medications regularly:  0    PHQ-2 Total Score: 1  History of Present Illness       Reason for visit:  Fatigue is getting worse and interrupting my ability to hold a job    She eats 2-3 servings of fruits and vegetables daily.She consumes 0 sweetened beverage(s) daily.She exercises with enough effort to increase her heart rate 10 to 19 minutes per day.  She exercises with enough effort to increase her heart rate 3 or less days per week.   She is taking medications regularly.    Today's PHQ-9         PHQ-9 Total Score: 10    PHQ-9 Q9 Thoughts of better off dead/self-harm past 2 weeks :   Not at all    How difficult have these problems made it for you to do your work, take care of things at home, or get along with other people: Very difficult          Ongoing Fatigue       Duration:been going on for years    Description (location/character/radiation): Fatigue/tiredness    Intensity:  Getting worse    Accompanying signs and symptoms: unable to get up hard to get up even with 3/4 alarms set    History (similar episodes/previous evaluation): Yes    Precipitating or alleviating factors: None    Therapies tried and outcome: None       Today's PHQ-2 Score:   PHQ-2 ( 1999 [employer]) 4/28/2022   Q1: Little interest or pleasure in doing things 0   Q2: Feeling down, depressed or hopeless 1   PHQ-2 Score 1   PHQ-2 Total Score (12-17 Years)- Positive if 3 or more points; Administer PHQ-A if positive -   Q1: Little interest or pleasure in doing things Not at all   Q2: Feeling down, depressed or hopeless Several days   PHQ-2 Score 1       Abuse: Current or Past (Physical, Sexual or Emotional) - No  Do you feel safe in your environment? Yes        Social History     Tobacco Use     Smoking status: Never Smoker     Smokeless tobacco:  Never Used     Tobacco comment: smokers in home   Substance Use Topics     Alcohol use: Yes     Comment: Alcoholic Drinks/day: rarely     If you drink alcohol do you typically have >3 drinks per day or >7 drinks per week? No    No flowsheet data found.    Reviewed orders with patient.  Reviewed health maintenance and updated orders accordingly - Yes  BP Readings from Last 3 Encounters:   09/22/22 110/70   09/15/22 119/80   06/23/22 108/62    Wt Readings from Last 3 Encounters:   09/22/22 87.4 kg (192 lb 9.6 oz)   09/15/22 90.7 kg (200 lb)   06/23/22 91.1 kg (200 lb 12.8 oz)                  Patient Active Problem List   Diagnosis     Hypothyroidism     Elevated antinuclear antibody (LETICIA) level     Depression, unspecified depression type     Dysmenorrhea     Chronic fatigue syndrome     IBS (irritable bowel syndrome)     Lymphopenia     Myalgia     Polyarthralgia     LETICIA positive     Mild major depression (H)     Dysphagia     Fibromyalgia     Mechanical low back pain     Menorrhagia     Past Surgical History:   Procedure Laterality Date     GYN SURGERY  06/23/2022    hysterectomy     LAPAROSCOPIC ASSISTED HYSTERECTOMY VAGINAL N/A 6/23/2022    Procedure: LAPAROSCOPIC ASSISTED VAGINAL HYSTERECTOMY;  Surgeon: Rayna Tran MD;  Location: [facility] OR     LAPAROSCOPIC SALPINGECTOMY Bilateral 6/23/2022    Procedure: BILATERAL SALPINGECTOMY;  Surgeon: Rayna Tran MD;  Location: [facility] OR       Social History     Tobacco Use     Smoking status: Never Smoker     Smokeless tobacco: Never Used     Tobacco comment: smokers in home   Substance Use Topics     Alcohol use: Yes     Comment: Alcoholic Drinks/day: rarely     Family History   Problem Relation Age of Onset     Thyroid Disease Other      Substance Abuse Father         EtOH     Thyroid Disease Maternal Grandmother      Cancer Brother         tumor in his thigh     Other Cancer Brother      Thyroid Disease Mother      Irritable Bowel  Syndrome Sister      Depression Sister      No Known Problems Brother      Cancer Brother          Tumor  in this thigh     Alcoholism Brother      Depression Brother      Substance Abuse Brother      Early Death Brother      Snoring Brother      Arthritis Maternal Grandmother      Asthma Maternal Grandmother      Diabetes Maternal Grandmother      Arthritis Mother      Depression Mother      Alcoholism Father          Current Outpatient Medications   Medication Sig Dispense Refill     acetaminophen (TYLENOL) 500 MG tablet Take 1,000 mg by mouth       albuterol (PROAIR HFA/PROVENTIL HFA/VENTOLIN HFA) 108 (90 Base) MCG/ACT inhaler Inhale 2 puffs into the lungs every 6 hours as needed for shortness of breath / dyspnea or wheezing 18 g 4     amitriptyline (ELAVIL) 10 MG tablet Take 0.5 tablets (5 mg) by mouth At Bedtime 90 tablet 1     fluticasone (FLONASE) 50 MCG/ACT spray Spray 1 spray into both nostrils daily       gabapentin (NEURONTIN) 300 MG capsule Take 1 capsule (300 mg) by mouth nightly as needed (pain) 90 capsule 1     levothyroxine (SYNTHROID/LEVOTHROID) 88 MCG tablet Take 1 tablet (88 mcg) by mouth daily (Patient taking differently: Take 88 mcg by mouth daily before breakfast) 90 tablet 3     sertraline (ZOLOFT) 50 MG tablet Take 1 tablet (50 mg) by mouth daily 90 tablet 3     Allergies   Allergen Reactions     Gluten Meal      Ibuprofen Sodium Cramps and GI Disturbance       Breast Cancer Screening:    Breast CA Risk Assessment (FHS-7) 6/15/2022   Do you have a family history of breast, colon, or ovarian cancer? No / Unknown         Patient under 40 years of age: Routine Mammogram Screening not recommended.   Pertinent mammograms are reviewed under the imaging tab.    History of abnormal Pap smear: NO - age 30- 65 PAP every 3 years recommended  PAP / HPV Latest Ref Rng & Units 9/1/2021 6/18/2016 6/17/2016   PAP   Negative for Intraepithelial Lesion or Malignancy (NILM) - -   PAP (Historical) - - - NIL  "  HPV16 Negative Negative Negative -   HPV18 Negative Negative Negative -   HRHPV Negative Negative Negative -     Reviewed and updated as needed this visit by clinical staff   Tobacco  Allergies  Meds   Med Hx  Surg Hx  Fam Hx  Soc Hx          Reviewed and updated as needed this visit by Provider                   Past Medical History:   Diagnosis Date     Depression      Fatigue      Hypothyroid      Hypothyroidism      IBS (irritable bowel syndrome)      Major depression, single episode 02/11/2022     Migraine       Past Surgical History:   Procedure Laterality Date     GYN SURGERY  06/23/2022    hysterectomy     LAPAROSCOPIC ASSISTED HYSTERECTOMY VAGINAL N/A 6/23/2022    Procedure: LAPAROSCOPIC ASSISTED VAGINAL HYSTERECTOMY;  Surgeon: Rayna Tran MD;  Location: [facility] OR     LAPAROSCOPIC SALPINGECTOMY Bilateral 6/23/2022    Procedure: BILATERAL SALPINGECTOMY;  Surgeon: Rayna Tran MD;  Location: [facility] OR       Review of Systems  Unremarkable other than listed above and below        OBJECTIVE:   /70 (BP Location: Right arm, Patient Position: Sitting, Cuff Size: Adult Regular)   Pulse 75   Temp 98.2  F (36.8  C) (Oral)   Ht 1.651 m (5' 5\")   Wt 87.4 kg (192 lb 9.6 oz)   LMP  (LMP Unknown)   SpO2 97%   BMI 32.05 kg/m    Physical Exam  GENERAL: healthy, alert and no distress  EYES: Eyes grossly normal to inspection, PERRL and conjunctivae and sclerae normal  HENT: ear canals and TM's normal, nose and mouth without ulcers or lesions  NECK: no adenopathy, no asymmetry, masses, or scars and thyroid normal to palpation  RESP: lungs clear to auscultation - no rales, rhonchi or wheezes  BREAST: normal without masses, tenderness or nipple discharge and no palpable axillary masses or adenopathy  CV: regular rate and rhythm, normal S1 S2,no peripheral edema and peripheral pulses strong  ABDOMEN: soft, nontender, no hepatosplenomegaly, no masses and bowel sounds " "normal  MS: no gross musculoskeletal defects noted, no edema  SKIN: no suspicious lesions or rashes  NEURO: , mentation intact and speech normal  PSYCH: mentation appears normal, affect normal/bright  LYMPH: no cervical, supraclavicular, axillary adenopathy        ASSESSMENT/PLAN:     Problem List Items Addressed This Visit        Endocrine    Hypothyroidism    Relevant Orders    Adult Endocrinology  Referral       Behavioral    Mild major depression (H)    Relevant Medications    amitriptyline (ELAVIL) 10 MG tablet      Other Visit Diagnoses     Other fatigue    -  Primary    Relevant Orders    Adult Endocrinology  Referral    Adult Sleep Eval & Management  Referral    Osteoarthritis of spine with radiculopathy, lumbar region        Relevant Medications    gabapentin (NEURONTIN) 300 MG capsule    amitriptyline (ELAVIL) 10 MG tablet    Annual physical exam        Anxiety        Relevant Medications    gabapentin (NEURONTIN) 300 MG capsule    amitriptyline (ELAVIL) 10 MG tablet    Class 1 drug-induced obesity without serious comorbidity with body mass index (BMI) of 32.0 to 32.9 in adult            Will reduce amitriptyline to 5 mg daily patient request to reduce her sertraline to 50 mg daily as well.      COUNSELING:  Reviewed preventive health counseling, as reflected in patient instructions       Regular exercise       Healthy diet/nutrition    Estimated body mass index is 32.05 kg/m  as calculated from the following:    Height as of this encounter: 1.651 m (5' 5\").    Weight as of this encounter: 87.4 kg (192 lb 9.6 oz).    Weight management plan: Discussed healthy diet and exercise guidelines    She reports that she has never smoked. She has never used smokeless tobacco.      Counseling Resources:  ATP IV Guidelines  Pooled Cohorts Equation Calculator  Breast Cancer Risk Calculator  BRCA-Related Cancer Risk Assessment: FHS-7 Tool  FRAX Risk Assessment  ICSI Preventive " Guidelines  Dietary Guidelines for Americans, 2010  USDA's MyPlate  ASA Prophylaxis  Lung CA Screening    Nadya Villalpando NP  [facility]

## 2022-10-13 ENCOUNTER — OFFICE VISIT (OUTPATIENT)
Dept: ORTHOPEDICS | Facility: CLINIC | Age: 37
End: 2022-10-13
Payer: COMMERCIAL

## 2022-10-13 ENCOUNTER — TELEPHONE (OUTPATIENT)
Dept: ORTHOPEDICS | Facility: CLINIC | Age: 37
End: 2022-10-13

## 2022-10-13 VITALS
HEART RATE: 86 BPM | SYSTOLIC BLOOD PRESSURE: 99 MMHG | DIASTOLIC BLOOD PRESSURE: 67 MMHG | WEIGHT: 192 LBS | BODY MASS INDEX: 31.95 KG/M2

## 2022-10-13 DIAGNOSIS — R20.2 NUMBNESS AND TINGLING IN LEFT HAND: ICD-10-CM

## 2022-10-13 DIAGNOSIS — M25.532 LEFT WRIST PAIN: Primary | ICD-10-CM

## 2022-10-13 DIAGNOSIS — R20.0 NUMBNESS AND TINGLING IN LEFT HAND: ICD-10-CM

## 2022-10-13 DIAGNOSIS — G56.22 ULNAR NEUROPATHY OF LEFT UPPER EXTREMITY: ICD-10-CM

## 2022-10-13 PROCEDURE — 99213 OFFICE O/P EST LOW 20 MIN: CPT | Performed by: PEDIATRICS

## 2022-10-13 NOTE — PROGRESS NOTES
ASSESSMENT & PLAN    Katiuska was seen today for numbness and follow up.    Diagnoses and all orders for this visit:    Left wrist pain  -     EMG; Future    Numbness and tingling in left hand  -     EMG; Future    Ulnar neuropathy of left upper extremity  -     EMG; Future      This issue is acute and Unchanged.    Given persistent symptoms will obtain EMG test.  Continue Occupational Therapy.    Plan:  - Today's Plan of Care:  Brace as needed  Referral for left upper extremity EMG    Discussed activity considerations and other supportive care including Ice/Heat, OTC and other topical medications as needed.    -We also discussed other future treatment options:  Referral to Hand Surgery  Consideration of US guided carpal tunnel injections    Follow Up: send us a MyChart to review EMG results    Concerning signs and symptoms were reviewed.  The patient expressed understanding of this management plan and all questions were answered at this time.    Petrona Pruitt MD [facility]  Sports Medicine Physician  [facility]      SUBJECTIVE- Interim History October 13, 2022    Chief Complaint   Patient presents with     Left Hand - Numbness, Follow Up       Katiuska De Jesus is a 36 year old female who is seen in f/u up for    Left wrist pain  Numbness and tingling in left hand  Ulnar neuropathy of left upper extremity.  Since last visit on 9/15/22, patient has been doing okay. The wrist brace helps the wrist but worsens the elbow.  She is having more pain/achy on the posterior aspect of the forearm, closer to the olecranon process. The brace seems to push on that area.  Occupational Therapy has not started yet.  - Now ~ 6 weeks from initial onset    Worsened by: typing, resting on the elbow, peeling, cutting, grabbing, pulling, twisting  Better with: resting, icing, positioning, wrist bracing (but can exacerbate numbness/tingling)  Treatments tried: rest/activity avoidance, ice, Tylenol and  casting/splinting/bracing  Associated symptoms: swelling, numbness, tingling, weakness of left hand/wrist and feeling of instability, constant achy     Orthopedic/Surgical history: YES - Date: chronic on/off similar left wrist pain  Social History/Occupation: works at a desk     No family history pertinent to patient's problem today.    REVIEW OF SYSTEMS:  Review of Systems  Skin: no bruising, no swelling  Musculoskeletal: as above  Neurologic: yes numbness, paresthesias  Remainder of review of systems is negative including constitutional, CV, pulmonary, GI, except as noted in HPI or medical history.    OBJECTIVE:  BP 99/67   Pulse 86   Wt 87.1 kg (192 lb)   LMP  (LMP Unknown)   BMI 31.95 kg/m       General: healthy, alert and in no distress  HEENT: no scleral icterus or conjunctival erythema  Skin: no suspicious lesions or rash. No jaundice.  CV: distal perfusion intact  Resp: normal respiratory effort without conversational dyspnea   Psych: normal mood and affect  Gait: normal steady gait with appropriate coordination and balance  Neuro: Normal light sensory exam of upper extremity     Bilateral Elbow exam:  Inspection:     no ecchymosis       no edema or effusion     Tender:     lateral epicondyle left       medial epicondyle left     Non-Tender:      remainder of the elbow bilaterally     ROM:      full with flexion, extension, forearm supination and pronation bilateral     Strength:     flexion 5/5 bilateral       extension 5/5 bilateral       forearm supination 5/5 bilateral       forearm pronation 5/5 bilateral     Special Tests:      negative     Sensation:     intact throughout hand       intact throughout forearm     Bilateral Wrist and Hand exam  Inspection:       No swelling, bruising or deformity bilateral     Tender:       Volar wrist     Non Tender:       Remainder of the Wrist and Hand bilateral     ROM:       Full and symmetric active and passive range of motion of the forearm, wrist and digits  bilateral     Strength:       5/5 strength in the muscles of the hand, wrist and forearm bilateral     Special Tests:        positive (+) Tinel's test left and       positive (+) Phalen's test left     Neurovascular:       2+ radial pulses bilaterally with brisk capillary refill and      normal sensation to light touch in the radial, median and ulnar nerve distributions    RADIOLOGY:  Final results and radiologist's interpretation, available in the [facility] health record.  Images were reviewed with the patient in the office today.  My personal interpretation of the performed imaging:   3 XR views of left wrist reviewed 9/15/2022: no acute bony abnormality, no significant degenerative change    Review of the result(s) of each unique test - XR

## 2022-10-13 NOTE — LETTER
10/13/2022         RE: Katiuska De Jesus  [address]  [place] [address]        Dear Colleague,    Thank you for referring your patient, Katiuska De Jesus, to the [facility]. Please see a copy of my visit note below.    ASSESSMENT & PLAN    Katiuska was seen today for numbness and follow up.    Diagnoses and all orders for this visit:    Left wrist pain  -     EMG; Future    Numbness and tingling in left hand  -     EMG; Future    Ulnar neuropathy of left upper extremity  -     EMG; Future      This issue is acute and Unchanged.    Given persistent symptoms will obtain EMG test.  Continue Occupational Therapy.    Plan:  - Today's Plan of Care:  Brace as needed  Referral for left upper extremity EMG    Discussed activity considerations and other supportive care including Ice/Heat, OTC and other topical medications as needed.    -We also discussed other future treatment options:  Referral to Hand Surgery  Consideration of US guided carpal tunnel injections    Follow Up: send us a MyChart to review EMG results    Concerning signs and symptoms were reviewed.  The patient expressed understanding of this management plan and all questions were answered at this time.    Petrona Pruitt MD [facility]  Sports Medicine Physician  [facility]      SUBJECTIVE- Interim History October 13, 2022    Chief Complaint   Patient presents with     Left Hand - Numbness, Follow Up       Katiuska De Jesus is a 36 year old female who is seen in f/u up for    Left wrist pain  Numbness and tingling in left hand  Ulnar neuropathy of left upper extremity.  Since last visit on 9/15/22, patient has been doing okay. The wrist brace helps the wrist but worsens the elbow.  She is having more pain/achy on the posterior aspect of the forearm, closer to the olecranon process. The brace seems to push on that area.  Occupational Therapy has not started yet.  - Now ~ 6 weeks from initial  onset    Worsened by: typing, resting on the elbow, peeling, cutting, grabbing, pulling, twisting  Better with: resting, icing, positioning, wrist bracing (but can exacerbate numbness/tingling)  Treatments tried: rest/activity avoidance, ice, Tylenol and casting/splinting/bracing  Associated symptoms: swelling, numbness, tingling, weakness of left hand/wrist and feeling of instability, constant achy     Orthopedic/Surgical history: YES - Date: chronic on/off similar left wrist pain  Social History/Occupation: works at a desk     No family history pertinent to patient's problem today.    REVIEW OF SYSTEMS:  Review of Systems  Skin: no bruising, no swelling  Musculoskeletal: as above  Neurologic: yes numbness, paresthesias  Remainder of review of systems is negative including constitutional, CV, pulmonary, GI, except as noted in HPI or medical history.    OBJECTIVE:  BP 99/67   Pulse 86   Wt 87.1 kg (192 lb)   LMP  (LMP Unknown)   BMI 31.95 kg/m       General: healthy, alert and in no distress  HEENT: no scleral icterus or conjunctival erythema  Skin: no suspicious lesions or rash. No jaundice.  CV: distal perfusion intact  Resp: normal respiratory effort without conversational dyspnea   Psych: normal mood and affect  Gait: normal steady gait with appropriate coordination and balance  Neuro: Normal light sensory exam of upper extremity     Bilateral Elbow exam:  Inspection:     no ecchymosis       no edema or effusion     Tender:     lateral epicondyle left       medial epicondyle left     Non-Tender:      remainder of the elbow bilaterally     ROM:      full with flexion, extension, forearm supination and pronation bilateral     Strength:     flexion 5/5 bilateral       extension 5/5 bilateral       forearm supination 5/5 bilateral       forearm pronation 5/5 bilateral     Special Tests:      negative     Sensation:     intact throughout hand       intact throughout forearm     Bilateral Wrist and Hand  exam  Inspection:       No swelling, bruising or deformity bilateral     Tender:       Volar wrist     Non Tender:       Remainder of the Wrist and Hand bilateral     ROM:       Full and symmetric active and passive range of motion of the forearm, wrist and digits bilateral     Strength:       5/5 strength in the muscles of the hand, wrist and forearm bilateral     Special Tests:        positive (+) Tinel's test left and       positive (+) Phalen's test left     Neurovascular:       2+ radial pulses bilaterally with brisk capillary refill and      normal sensation to light touch in the radial, median and ulnar nerve distributions    RADIOLOGY:  Final results and radiologist's interpretation, available in the [facility] health record.  Images were reviewed with the patient in the office today.  My personal interpretation of the performed imaging:   3 XR views of left wrist reviewed 9/15/2022: no acute bony abnormality, no significant degenerative change    Review of the result(s) of each unique test - XR             Again, thank you for allowing me to participate in the care of your patient.        Sincerely,        Petrona Pruitt MD

## 2022-10-14 ENCOUNTER — PRE VISIT (OUTPATIENT)
Dept: GASTROENTEROLOGY | Facility: CLINIC | Age: 37
End: 2022-10-14

## 2022-10-19 ENCOUNTER — THERAPY VISIT (OUTPATIENT)
Dept: OCCUPATIONAL THERAPY | Facility: CLINIC | Age: 37
End: 2022-10-19
Attending: PEDIATRICS
Payer: COMMERCIAL

## 2022-10-19 DIAGNOSIS — R20.2 NUMBNESS AND TINGLING IN LEFT HAND: Primary | ICD-10-CM

## 2022-10-19 DIAGNOSIS — G56.22 ULNAR NEUROPATHY OF LEFT UPPER EXTREMITY: ICD-10-CM

## 2022-10-19 DIAGNOSIS — R20.0 NUMBNESS AND TINGLING IN LEFT HAND: Primary | ICD-10-CM

## 2022-10-19 DIAGNOSIS — M25.532 LEFT WRIST PAIN: ICD-10-CM

## 2022-10-19 PROCEDURE — 97112 NEUROMUSCULAR REEDUCATION: CPT | Mod: GO | Performed by: OCCUPATIONAL THERAPIST

## 2022-10-19 PROCEDURE — 97110 THERAPEUTIC EXERCISES: CPT | Mod: GO | Performed by: OCCUPATIONAL THERAPIST

## 2022-10-19 PROCEDURE — 97166 OT EVAL MOD COMPLEX 45 MIN: CPT | Mod: GO | Performed by: OCCUPATIONAL THERAPIST

## 2022-10-19 NOTE — PROGRESS NOTES
[facility] Initial Evaluation     Current Date: 10/19/2022    Diagnosis: Left elbow and wrist pain and hand paraesthesias    DOI: 9/15/2022 (therapy referral)    Subjective:  Patient Health History  Katiuska De Jesus being seen for Pain in elbow and wrist/hand.     Problem began: 9/12/2022.   Problem occurred: Came on suddenly   Pain is reported as 7/10 on pain scale.  General health as reported by patient is fair.  Pertinent medical history includes: migraines/headaches, numbness/tingling, thyroid problems and weakness.   Red flags:  None as reported by patient.  Medical allergies: other. Other medical allergies details: see list in EMR.   Surgeries include:  Other. Other surgery history details: Hysterectomy.    Current medications:  Thyroid medication and other. Other medications details: Nerve and IBS calming meds.    Current occupation is  .   Primary job tasks include:  Computer work, lifting/carrying and prolonged sitting.                Occupational Profile Information:  [employer]idext[employer] (right for eating, mousing and writing only)  Prior functional level:  no limitations  Patient reports symptoms of pain, stiffness/loss of motion, weakness/loss of strength, numbness and tingling; onset 1-2 month ago  Special tests:  x-ray and EMG to be scheduled.    Previous treatment: OTC wrist splint stopped wearing due to increased elbow pain  Barriers include:none  Mobility: No difficulty  Transportation: drives  Currently working in normal job without restrictions    Functional Outcome Measure:  Upper Extremity Functional Index  SCORE:   Column Totals: 35/80  (A lower score indicates greater disability.)    Objective:  Pain Level (Scale 0-10)   10/19/2022   Least 5   Worst 8     Pain Description  Date 10/19/2022   Location Medial elbow and radiates along ulnar forearm into wrist and hand   Pain Quality Aching and Sharp   Frequency constant     Pain is worst  daytime and nighttime   Exacerbated by  pressure  to arm, overuse   Relieved by Rest, repositioning    Progression Gradually worsening     Posture  Forward Neck Posture and Rounded Forward Shoulders. Pt reported increased symptoms with postural correction during exam.    Edema  None    Sensation  Decreased Ulnar Nerve distribution per pt report    ROM   WNL elbow, wrist and hand. No thenar or intrinsic atrophy noted on exam. Increased elbow pain with fisting fingers.    Special Tests   - none    + mild    ++ moderate    +++ severe         10/19/2022   Matilde Test + slight   Elbow Flexion Test + med n   Tinels Cubital Tunnel +   Tinels [place] -   Median Nerve Compression at Pronator -   Carpal-Compression Test + ulnar n   [place] test for lumbrical incursion  (fist x 30 secs) -   Tinels at Carpal Tunnel + ulnar n   Phalens + ulnar and med n     Neural Tension Testing  MNT: Median Neurodynamic Test (based on JAMIE Syed's ULNT)   10/19/2022   0-5 Scale 1/5 S1  2/5 S2   0/5: Arm across abdomen in coronal plane  1/5: Depress shoulder, ER to neutral Abd shoulder to 45 degrees  2/5: ER shoulder to end range, keep elbow at 90 degrees  3/5: Extend elbow to 0 degrees  4/5: Fully supinate forearm  5/5: Extend wrist, fingers and thumb  Notes:    (+) indicates beyond grade level but less than USP to next level    (-) indicates over USP to level    S1 onset/change of patient's symptoms    S2 definite stop point based on patient's discomfort level    Strength   (Measured in pounds)  Pain Report: - none  + mild    ++ moderate    +++ severe    10/19/2022 10/19/2022   Trials Right Left   1  2  3 73  63  53 62++  1 trial due to medial elbow pain   Average 63      Palpation  Pain Report:  - none    + mild    ++ moderate    +++ severe    10/19/2022   Pec Major -   Cubital Tunnel +++   MEP ++   Flexor wad +   Volar wrist +   LEP +   Extensors +   Dorsal wrist +     Assessment:  Patient presents with symptoms consistent with diagnosis of left elbow, wrist and hand pain and  numbness, with conservative intervention.     Patient's limitations or Problem List includes:  Pain, Weakness, Sensory disturbance, Decreased  and Tightness in musculature of the left elbow, wrist and hand which interferes with the patient's ability to perform Self Care Tasks (dressing, eating, bathing), Work Tasks, Sleep Patterns, Recreational Activities, Household Chores and Driving  as compared to previous level of function.    Rehab Potential:  Good - Return to full activity, some limitations    Patient will benefit from skilled Occupational Therapy to increase flexibility, overall strength,  strength and sensation and decrease pain to return to previous activity level and resume normal daily tasks and to reach their rehab potential.    Barriers to Learning:  No barrier    Communication Issues:  Patient appears to be able to clearly communicate and understand verbal and written communication and follow directions correctly.    Chart Review: Chart Review and Simple history review with patient    Identified Performance Deficits: bathing/showering, dressing, hygiene and grooming, driving and community mobility, home establishment and management, meal preparation and cleanup, sleep, work and volunteer activities    Assessment of Occupational Performance:  5 or more Performance Deficits    Clinical Decision Making (Complexity): Moderate complexity    Treatment Explanation:  The following has been discussed with the patient:  RX ordered/plan of care  Anticipated outcomes  Possible risks and side effects    Plan:  Frequency:  1 X week, once daily  Duration:  for 2 months    Treatment Plan:    Modalities:    US   Therapeutic Exercise:    AROM, PROM, Tendon Gliding and Isotonics  Neuromuscular re-ed:   Nerve Gliding, Posture and Kinesiotaping  Manual Techniques:   Friction massage and Myofascial release  Orthotic Fabrication:    Static Forearm based  Self Care:    Self Care Tasks and Ergonomic  Considerations    Discharge Plan:  Achieve all LTG.  Independent in home treatment program.  Reach maximal therapeutic benefit.    Home Exercise Program:  Warmth for stiffness to forearm flexors  Ice after activity for pain at medial elbow  Forearm flexor stretches  Tendon gliding with 3 fists  Partial proximal median nerve gliding with shoulder and wrist extension  Trial k-tape to MEP and cubital tunnel  Postural awareness, avoid rounded shoulders and forward head  Avoid leaning on elbow and prolonged elbow flexion  Avoid prolonged wrist flexion  Work breaks 30-60 seconds every 30-60 minutes     Next Visit:  See in 1 week  Assess response to HEP and k-tape  Consider US if continued tenderness at MEP  Consider elbow flexion block orthosis for night wear if no change  Increase nerve gliding as tolerated  Add MFR to forearm and TFM to MEP as indicated   Progress to pec stretches, scalene stretches and postural exercises

## 2022-10-26 ENCOUNTER — THERAPY VISIT (OUTPATIENT)
Dept: OCCUPATIONAL THERAPY | Facility: CLINIC | Age: 37
End: 2022-10-26
Payer: COMMERCIAL

## 2022-10-26 DIAGNOSIS — R20.2 NUMBNESS AND TINGLING IN LEFT HAND: ICD-10-CM

## 2022-10-26 DIAGNOSIS — R20.0 NUMBNESS AND TINGLING IN LEFT HAND: ICD-10-CM

## 2022-10-26 DIAGNOSIS — M25.532 LEFT WRIST PAIN: Primary | ICD-10-CM

## 2022-10-26 DIAGNOSIS — G56.22 ULNAR NEUROPATHY OF LEFT UPPER EXTREMITY: ICD-10-CM

## 2022-10-26 PROCEDURE — 97110 THERAPEUTIC EXERCISES: CPT | Mod: GO | Performed by: OCCUPATIONAL THERAPIST

## 2022-10-26 PROCEDURE — 97112 NEUROMUSCULAR REEDUCATION: CPT | Mod: GO | Performed by: OCCUPATIONAL THERAPIST

## 2022-10-26 NOTE — PROGRESS NOTES
SOAP note objective information for 10/26/2022:    Diagnosis: Left elbow and wrist pain and hand paraesthesias    DOI: 9/15/2022 (therapy referral)    Pain Level (Scale 0-10)   10/19/2022 10/26/2022   Least 5 6   Worst 8 10     Special Tests   - none    + mild    ++ moderate    +++ severe         10/19/2022   Matilde Test + slight   Elbow Flexion Test + med n   Tinels Cubital Tunnel +   Tinels [place] -   Median Nerve Compression at Pronator -   Carpal-Compression Test + ulnar n   [place] test for lumbrical incursion  (fist x 30 secs) -   Tinels at Carpal Tunnel + ulnar n   Phalens + ulnar and med n     Neural Tension Testing  MNT: Median Neurodynamic Test (based on JAMIE Syed's ULNT)   10/19/2022   0-5 Scale 1/5 S1  2/5 S2   0/5: Arm across abdomen in coronal plane  1/5: Depress shoulder, ER to neutral Abd shoulder to 45 degrees  2/5: ER shoulder to end range, keep elbow at 90 degrees  3/5: Extend elbow to 0 degrees  4/5: Fully supinate forearm  5/5: Extend wrist, fingers and thumb  Notes:    (+) indicates beyond grade level but less than skilled nursing to next level    (-) indicates over skilled nursing to level    S1 onset/change of patient's symptoms    S2 definite stop point based on patient's discomfort level    Palpation  Pain Report:  - none    + mild    ++ moderate    +++ severe    10/19/2022 10/26/2022   Pec Major - NT   Cubital Tunnel +++ ++   MEP ++ +   Flexor wad + +   Volar wrist + +   LEP + +   Extensors + +   Dorsal wrist + +     Home Exercise Program:  Warmth for stiffness to forearm flexors  Ice after activity for pain at medial elbow  Forearm flexor stretches  Tendon gliding with 3 fists  Partial proximal median nerve gliding with shoulder and wrist extension  Postural exercises with chin tucks and scapular retraction  Modified scalene stretch, arm behind back (unable to grasp edge of chair due to elbow pain)  Trial k-tape to MEP and cubital tunnel  Postural awareness, avoid rounded shoulders and forward  head  Avoid leaning on elbow and prolonged elbow flexion  Avoid prolonged wrist flexion  Work breaks 30-60 seconds every 30-60 minutes     Next Visit:  See in 2 weeks as pt has EMG next week  Assess response to postural exercises and modified scalene stretch   Consider US if continued tenderness at MEP  Consider elbow flexion block or wrist orthosis for night wear if no change  Increase nerve gliding as tolerated  Add MFR to forearm and TFM to MEP as indicated   Progress to pec stretches    Please refer to the daily flowsheet for treatment today, total treatment time and time spent performing 1:1 timed codes.

## 2022-10-27 ENCOUNTER — DOCUMENTATION ONLY (OUTPATIENT)
Dept: OTHER | Facility: CLINIC | Age: 37
End: 2022-10-27

## 2022-11-14 ENCOUNTER — MYC MEDICAL ADVICE (OUTPATIENT)
Dept: INTERNAL MEDICINE | Facility: CLINIC | Age: 37
End: 2022-11-14

## 2022-11-14 DIAGNOSIS — G93.32 CHRONIC FATIGUE SYNDROME: Primary | ICD-10-CM

## 2022-11-17 NOTE — TELEPHONE ENCOUNTER
RECORDS RECEIVED FROM: Other fatigue, hypothyroidism; referred by Nadya Villalpando   DATE RECEIVED: 3/7/2023   NOTES (FOR ALL VISITS) STATUS DETAILS   OFFICE NOTES from referring provider [facility]:  11/14/22 - MyChart referral from Nadya Villalpando NP  9/22/22, 5/5/22 - PCC OV with Nadya Villalpando NP   OFFICE NOTES from other specialist N/A    ED NOTES N/A    OPERATIVE REPORT  (thyroid, pituitary, adrenal, parathyroid) N/A    MEDICATION LIST Internal    IMAGING      MRI (BRAIN) Internal [facility]:  10/13/20 - MRI Brain   CT (HEAD/NECK/CHEST/ABDOMEN) Internal [facility]:  4/20/22 - CTA Chest  4/6/22 - CT Abd/Pelvis   LABS     DIABETES: HBGA1C, CREATININE, FASTING LIPIDS, MICROALBUMIN URINE, POTASSIUM, TSH, T4    THYROID: TSH, T4, CBC, THYRODLONULIN, TOTAL T3, FREE T4, CALCITONIN, CEA Care Everywhere / Internal   [facility]:  6/17/22 - BMP  6/17/22 - CBC  4/6/22 - CMP  4/6/22 - TSH, T4  4/6/22 - Vitamin D  9/1/21 - Lipid  10/18/19 - HBGA1C  10/18/19 - Routine UA    [place]:  3/4/21 - Calcium  3/4/21 - Creatinine  3/4/21 - Glucose  3/4/21 - Potassium

## 2022-12-01 NOTE — PATIENT INSTRUCTIONS - HE
Referral to [facility]   H Plasty Text: Given the location of the defect, shape of the defect and the proximity to free margins a H-plasty was deemed most appropriate for repair.  Using a sterile surgical marker, the appropriate advancement arms of the H-plasty were drawn incorporating the defect and placing the expected incisions within the relaxed skin tension lines where possible. The area thus outlined was incised deep to adipose tissue with a #15 scalpel blade. The skin margins were undermined to an appropriate distance in all directions utilizing iris scissors.  The opposing advancement arms were then advanced into place in opposite direction and anchored with interrupted buried subcutaneous sutures.

## 2022-12-19 PROBLEM — R20.0 NUMBNESS AND TINGLING IN LEFT HAND: Status: RESOLVED | Noted: 2022-10-13 | Resolved: 2022-12-19

## 2022-12-19 PROBLEM — M25.532 LEFT WRIST PAIN: Status: RESOLVED | Noted: 2022-10-13 | Resolved: 2022-12-19

## 2022-12-19 PROBLEM — R20.2 NUMBNESS AND TINGLING IN LEFT HAND: Status: RESOLVED | Noted: 2022-10-13 | Resolved: 2022-12-19

## 2022-12-19 PROBLEM — G56.22 ULNAR NEUROPATHY OF LEFT UPPER EXTREMITY: Status: RESOLVED | Noted: 2022-10-13 | Resolved: 2022-12-19

## 2023-01-10 ASSESSMENT — SLEEP AND FATIGUE QUESTIONNAIRES
HOW LIKELY ARE YOU TO NOD OFF OR FALL ASLEEP WHILE SITTING QUIETLY AFTER LUNCH WITHOUT ALCOHOL: SLIGHT CHANCE OF DOZING
HOW LIKELY ARE YOU TO NOD OFF OR FALL ASLEEP WHILE WATCHING TV: MODERATE CHANCE OF DOZING
HOW LIKELY ARE YOU TO NOD OFF OR FALL ASLEEP WHILE LYING DOWN TO REST IN THE AFTERNOON WHEN CIRCUMSTANCES PERMIT: HIGH CHANCE OF DOZING
HOW LIKELY ARE YOU TO NOD OFF OR FALL ASLEEP WHILE SITTING AND READING: SLIGHT CHANCE OF DOZING
HOW LIKELY ARE YOU TO NOD OFF OR FALL ASLEEP WHILE SITTING AND TALKING TO SOMEONE: WOULD NEVER DOZE
HOW LIKELY ARE YOU TO NOD OFF OR FALL ASLEEP IN A CAR, WHILE STOPPED FOR A FEW MINUTES IN TRAFFIC: MODERATE CHANCE OF DOZING
HOW LIKELY ARE YOU TO NOD OFF OR FALL ASLEEP WHILE SITTING INACTIVE IN A PUBLIC PLACE: WOULD NEVER DOZE
HOW LIKELY ARE YOU TO NOD OFF OR FALL ASLEEP WHEN YOU ARE A PASSENGER IN A CAR FOR AN HOUR WITHOUT A BREAK: WOULD NEVER DOZE

## 2023-01-16 PROBLEM — E66.09 CLASS 1 OBESITY DUE TO EXCESS CALORIES WITHOUT SERIOUS COMORBIDITY WITH BODY MASS INDEX (BMI) OF 32.0 TO 32.9 IN ADULT: Chronic | Status: ACTIVE | Noted: 2023-01-16

## 2023-01-16 PROBLEM — N92.0 MENORRHAGIA: Status: ACTIVE | Noted: 2022-06-23

## 2023-01-16 PROBLEM — M79.10 MYALGIA: Status: ACTIVE | Noted: 2019-12-05

## 2023-01-16 PROBLEM — R76.8 ANA POSITIVE: Status: ACTIVE | Noted: 2020-01-23

## 2023-01-16 PROBLEM — R13.10 DYSPHAGIA: Status: ACTIVE | Noted: 2020-06-22

## 2023-01-16 PROBLEM — K58.9 IBS (IRRITABLE BOWEL SYNDROME): Status: ACTIVE | Noted: 2020-08-14

## 2023-01-16 PROBLEM — E66.811 CLASS 1 OBESITY DUE TO EXCESS CALORIES WITHOUT SERIOUS COMORBIDITY WITH BODY MASS INDEX (BMI) OF 32.0 TO 32.9 IN ADULT: Chronic | Status: ACTIVE | Noted: 2023-01-16

## 2023-01-16 PROBLEM — M25.50 POLYARTHRALGIA: Status: ACTIVE | Noted: 2020-01-23

## 2023-01-16 PROBLEM — D69.6 THROMBOCYTOPENIA (H): Status: ACTIVE | Noted: 2023-01-16

## 2023-01-16 PROBLEM — F41.1 GAD (GENERALIZED ANXIETY DISORDER): Chronic | Status: ACTIVE | Noted: 2023-01-16

## 2023-01-16 PROBLEM — F32.A DEPRESSION, UNSPECIFIED DEPRESSION TYPE: Chronic | Status: ACTIVE | Noted: 2019-07-19

## 2023-01-16 PROBLEM — M54.59 MECHANICAL LOW BACK PAIN: Status: ACTIVE | Noted: 2021-03-03

## 2023-01-17 ENCOUNTER — VIRTUAL VISIT (OUTPATIENT)
Dept: SLEEP MEDICINE | Facility: CLINIC | Age: 38
End: 2023-01-17
Attending: NURSE PRACTITIONER
Payer: COMMERCIAL

## 2023-01-17 ENCOUNTER — TELEPHONE (OUTPATIENT)
Dept: INTERNAL MEDICINE | Facility: CLINIC | Age: 38
End: 2023-01-17

## 2023-01-17 ENCOUNTER — MYC MEDICAL ADVICE (OUTPATIENT)
Dept: INTERNAL MEDICINE | Facility: CLINIC | Age: 38
End: 2023-01-17

## 2023-01-17 VITALS — HEIGHT: 65 IN | BODY MASS INDEX: 32.32 KG/M2 | WEIGHT: 194 LBS

## 2023-01-17 DIAGNOSIS — E66.09 CLASS 1 OBESITY DUE TO EXCESS CALORIES WITHOUT SERIOUS COMORBIDITY WITH BODY MASS INDEX (BMI) OF 32.0 TO 32.9 IN ADULT: Chronic | ICD-10-CM

## 2023-01-17 DIAGNOSIS — R53.83 OTHER FATIGUE: Primary | ICD-10-CM

## 2023-01-17 DIAGNOSIS — E66.811 CLASS 1 OBESITY DUE TO EXCESS CALORIES WITHOUT SERIOUS COMORBIDITY WITH BODY MASS INDEX (BMI) OF 32.0 TO 32.9 IN ADULT: Chronic | ICD-10-CM

## 2023-01-17 PROBLEM — M79.7 FIBROMYALGIA: Chronic | Status: ACTIVE | Noted: 2021-03-04

## 2023-01-17 PROBLEM — K58.9 IBS (IRRITABLE BOWEL SYNDROME): Chronic | Status: ACTIVE | Noted: 2020-08-14

## 2023-01-17 PROBLEM — R13.10 DYSPHAGIA: Status: RESOLVED | Noted: 2020-06-22 | Resolved: 2023-01-17

## 2023-01-17 PROBLEM — M54.50 CHRONIC LOW BACK PAIN: Status: ACTIVE | Noted: 2021-03-03

## 2023-01-17 PROBLEM — G89.29 CHRONIC LOW BACK PAIN: Status: ACTIVE | Noted: 2021-03-03

## 2023-01-17 PROBLEM — G89.29 CHRONIC LOW BACK PAIN: Chronic | Status: ACTIVE | Noted: 2021-03-03

## 2023-01-17 PROBLEM — M54.50 CHRONIC LOW BACK PAIN: Chronic | Status: ACTIVE | Noted: 2021-03-03

## 2023-01-17 PROCEDURE — 99205 OFFICE O/P NEW HI 60 MIN: CPT | Mod: 95 | Performed by: INTERNAL MEDICINE
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
## 2023-01-17 NOTE — PATIENT INSTRUCTIONS

## 2023-01-17 NOTE — PROGRESS NOTES
Enedelia is a 37 year old who is being evaluated via a billable video visit.      How would you like to obtain your AVS? "[employer]hart  If the video visit is dropped, the invitation should be resent by: Text to cell phone: [phone]  Will anyone else be joining your video visit? No        Video-Visit Details    Type of service:  Video Visit     Originating Location (pt. Location): Home    Distant Location (provider location):  Off-site  Platform used for Video Visit: Richie Coppola

## 2023-01-17 NOTE — TELEPHONE ENCOUNTER
TCB will also send MyChart msg       PCP would like pt to be scheduled for a f/u for fatigue. This can be virtually. If pt calls back, please assist w/ scheduling appt when PCP is back in office.       Thank you,  Rolando Feliciano Jr., CMA on 1/17/2023 at 10:26 AM

## 2023-01-24 NOTE — PROGRESS NOTES
"Enedelia is a 37 year old who is being evaluated via a billable video visit.      How would you like to obtain your AVS? MyChart  If the video visit is dropped, the invitation should be resent by: Text to cell phone: [phone]  Will anyone else be joining your video visit? No          Assessment & Plan   Problem List Items Addressed This Visit        Nervous and Auditory    Chronic fatigue syndrome - Primary       Behavioral    Major depression, recurrent (H)   Patient was seen by sleep medicine on 1.17.23. As per documentation, patient had negative sleep study with recommendation to minimize elavil, gabapentin, zoloft use, sleep hygiene reviewed, discussed trial of sleep extension and to consider a stimulant medication as an adjunct for treatment of depression though noted may worsen anxiety.      Patient reports she is attempting to minimize distraction and her new job has \"a lot of moving parts\".               MEDICATIONS:   Orders Placed This Encounter   Medications     amphetamine-dextroamphetamine (ADDERALL XR) 5 MG 24 hr capsule     Sig: Take 1 capsule (5 mg) by mouth daily for 30 days     Dispense:  30 capsule     Refill:  0     amphetamine-dextroamphetamine (ADDERALL XR) 5 MG 24 hr capsule     Sig: Take 1 capsule (5 mg) by mouth daily for 30 days     Dispense:  30 capsule     Refill:  0     amphetamine-dextroamphetamine (ADDERALL XR) 5 MG 24 hr capsule     Sig: Take 1 capsule (5 mg) by mouth daily for 30 days     Dispense:  30 capsule     Refill:  0          - Continue other medications without change  FUTURE LABS:       - Schedule a non-fasting blood draw 4 weeks drug screen   FUTURE APPOINTMENTS:       - Follow-up visit in 4 weeks for contract signing     No follow-ups on file.    Nadya Villalpando NP  [facility]    Subjective   Enedelia is a 37 year old, presenting for the following health issues:  No chief complaint on file.      History of Present Illness       Reason for visit:  Follow up " from sleep specialist    She eats 4 or more servings of fruits and vegetables daily.She consumes 0 sweetened beverage(s) daily.She exercises with enough effort to increase her heart rate 10 to 19 minutes per day.  She exercises with enough effort to increase her heart rate 3 or less days per week.   She is taking medications regularly.             Review of Systems   Unremarkable other than listed above and below         Objective           Vitals:  No vitals were obtained today due to virtual visit.    Physical Exam   GENERAL: Healthy, alert and no distress  EYES: Eyes grossly normal to inspection.  No discharge or erythema, or obvious scleral/conjunctival abnormalities.  RESP: No audible wheeze, cough, or visible cyanosis.  No visible retractions or increased work of breathing.    SKIN: Visible skin clear. No significant rash, abnormal pigmentation or lesions.  NEURO: Cranial nerves grossly intact.  Mentation and speech appropriate for age.  PSYCH: Mentation appears normal, affect normal/bright, judgement and insight intact, normal speech and appearance well-groomed.                Video-Visit Details    Type of service:  Video Visit   8:00am  8:08am   Originating Location (pt. Location): Home  Distant Location (provider location):  Off-site  Platform used for Video Visit: Ricardo

## 2023-01-25 ENCOUNTER — VIRTUAL VISIT (OUTPATIENT)
Dept: INTERNAL MEDICINE | Facility: CLINIC | Age: 38
End: 2023-01-25
Payer: COMMERCIAL

## 2023-01-25 DIAGNOSIS — G93.32 CHRONIC FATIGUE SYNDROME: Primary | ICD-10-CM

## 2023-01-25 DIAGNOSIS — R41.840 DIFFICULTY CONCENTRATING: ICD-10-CM

## 2023-01-25 DIAGNOSIS — F33.9 RECURRENT MAJOR DEPRESSIVE DISORDER, REMISSION STATUS UNSPECIFIED (H): ICD-10-CM

## 2023-01-25 DIAGNOSIS — Z79.899 LONG-TERM CURRENT USE OF STIMULANT: ICD-10-CM

## 2023-01-25 PROCEDURE — 99214 OFFICE O/P EST MOD 30 MIN: CPT | Mod: 95 | Performed by: NURSE PRACTITIONER

## 2023-01-25 RX ORDER — DEXTROAMPHETAMINE SACCHARATE, AMPHETAMINE ASPARTATE MONOHYDRATE, DEXTROAMPHETAMINE SULFATE AND AMPHETAMINE SULFATE 1.25; 1.25; 1.25; 1.25 MG/1; MG/1; MG/1; MG/1
5 CAPSULE, EXTENDED RELEASE ORAL DAILY
Qty: 30 CAPSULE | Refills: 0 | Status: SHIPPED | OUTPATIENT
Start: 2023-02-25 | End: 2023-03-27

## 2023-01-25 RX ORDER — DEXTROAMPHETAMINE SACCHARATE, AMPHETAMINE ASPARTATE MONOHYDRATE, DEXTROAMPHETAMINE SULFATE AND AMPHETAMINE SULFATE 1.25; 1.25; 1.25; 1.25 MG/1; MG/1; MG/1; MG/1
5 CAPSULE, EXTENDED RELEASE ORAL DAILY
Qty: 30 CAPSULE | Refills: 0 | Status: SHIPPED | OUTPATIENT
Start: 2023-01-25 | End: 2023-02-24

## 2023-01-25 RX ORDER — DEXTROAMPHETAMINE SACCHARATE, AMPHETAMINE ASPARTATE MONOHYDRATE, DEXTROAMPHETAMINE SULFATE AND AMPHETAMINE SULFATE 1.25; 1.25; 1.25; 1.25 MG/1; MG/1; MG/1; MG/1
5 CAPSULE, EXTENDED RELEASE ORAL DAILY
Qty: 30 CAPSULE | Refills: 0 | Status: SHIPPED | OUTPATIENT
Start: 2023-03-28 | End: 2023-04-07

## 2023-02-22 ENCOUNTER — VIRTUAL VISIT (OUTPATIENT)
Dept: INTERNAL MEDICINE | Facility: CLINIC | Age: 38
End: 2023-02-22
Payer: COMMERCIAL

## 2023-02-22 DIAGNOSIS — G93.32 CHRONIC FATIGUE SYNDROME: Primary | ICD-10-CM

## 2023-02-22 DIAGNOSIS — R41.840 DIFFICULTY CONCENTRATING: ICD-10-CM

## 2023-02-22 PROCEDURE — 99213 OFFICE O/P EST LOW 20 MIN: CPT | Mod: VID | Performed by: NURSE PRACTITIONER

## 2023-02-22 RX ORDER — DEXTROAMPHETAMINE SACCHARATE, AMPHETAMINE ASPARTATE, DEXTROAMPHETAMINE SULFATE AND AMPHETAMINE SULFATE 1.25; 1.25; 1.25; 1.25 MG/1; MG/1; MG/1; MG/1
5 TABLET ORAL DAILY
Qty: 30 TABLET | Refills: 0 | Status: SHIPPED | OUTPATIENT
Start: 2023-04-25 | End: 2023-04-07

## 2023-02-22 RX ORDER — DEXTROAMPHETAMINE SACCHARATE, AMPHETAMINE ASPARTATE, DEXTROAMPHETAMINE SULFATE AND AMPHETAMINE SULFATE 1.25; 1.25; 1.25; 1.25 MG/1; MG/1; MG/1; MG/1
5 TABLET ORAL DAILY
Qty: 30 TABLET | Refills: 0 | Status: SHIPPED | OUTPATIENT
Start: 2023-03-25 | End: 2023-04-05

## 2023-02-22 RX ORDER — DEXTROAMPHETAMINE SACCHARATE, AMPHETAMINE ASPARTATE, DEXTROAMPHETAMINE SULFATE AND AMPHETAMINE SULFATE 1.25; 1.25; 1.25; 1.25 MG/1; MG/1; MG/1; MG/1
5 TABLET ORAL DAILY
Qty: 30 TABLET | Refills: 0 | Status: SHIPPED | OUTPATIENT
Start: 2023-02-22 | End: 2023-03-01

## 2023-02-22 NOTE — PROGRESS NOTES
Enedelia is a 37 year old who is being evaluated via a billable video visit.      How would you like to obtain your AVS? MyChart  If the video visit is dropped, the invitation should be resent by: Text to cell phone: [phone]  Will anyone else be joining your video visit? No          Assessment & Plan   Problem List Items Addressed This Visit        Nervous and Auditory    Chronic fatigue syndrome - Primary    Relevant Medications    amphetamine-dextroamphetamine (ADDERALL) 5 MG tablet    amphetamine-dextroamphetamine (ADDERALL) 5 MG tablet (Start on 3/25/2023)    amphetamine-dextroamphetamine (ADDERALL) 5 MG tablet (Start on 4/25/2023)   Other Visit Diagnoses     Difficulty concentrating        Relevant Medications    amphetamine-dextroamphetamine (ADDERALL) 5 MG tablet    amphetamine-dextroamphetamine (ADDERALL) 5 MG tablet (Start on 3/25/2023)    amphetamine-dextroamphetamine (ADDERALL) 5 MG tablet (Start on 4/25/2023)       Patient states the first day she took the adderall and found reduced exhaustion and improved concentration though does find by the afternoon begins to feel increase in fatigue and increase difficulty with concentration.    Continue on 5mg adderall xr in the morning and prn adderall 5mg in the afternoon.          MEDICATIONS:   Orders Placed This Encounter   Medications     amphetamine-dextroamphetamine (ADDERALL) 5 MG tablet     Sig: Take 1 tablet (5 mg) by mouth daily for 30 days     Dispense:  30 tablet     Refill:  0     amphetamine-dextroamphetamine (ADDERALL) 5 MG tablet     Sig: Take 1 tablet (5 mg) by mouth daily for 30 days     Dispense:  30 tablet     Refill:  0     amphetamine-dextroamphetamine (ADDERALL) 5 MG tablet     Sig: Take 1 tablet (5 mg) by mouth daily for 30 days     Dispense:  30 tablet     Refill:  0          - Continue other medications without change    No follow-ups on file.    Nadya Villalpando NP  [facility]   Enedelia is a 37 year old,  presenting for the following health issues:  No chief complaint on file.      History of Present Illness       Reason for visit:  Follow up with new medication prescribed    She eats 4 or more servings of fruits and vegetables daily.She consumes 0 sweetened beverage(s) daily.She exercises with enough effort to increase her heart rate 10 to 19 minutes per day.  She exercises with enough effort to increase her heart rate 3 or less days per week.   She is taking medications regularly.             Review of Systems   Unremarkable other than listed above and below         Objective           Vitals:  No vitals were obtained today due to virtual visit.    Physical Exam   GENERAL: Healthy, alert and no distress  EYES: Eyes grossly normal to inspection.  No discharge or erythema, or obvious scleral/conjunctival abnormalities.  RESP: No audible wheeze, cough, or visible cyanosis.  No visible retractions or increased work of breathing.    SKIN: Visible skin clear. No significant rash, abnormal pigmentation or lesions.  NEURO: Cranial nerves grossly intact.  Mentation and speech appropriate for age.  PSYCH: Mentation appears normal, affect normal/bright, judgement and insight intact, normal speech and appearance well-groomed.                Video-Visit Details    Type of service:  Video Visit   8:00 am  8:09am   Originating Location (pt. Location): Home    Distant Location (provider location):  Off-site  Platform used for Video Visit: [employer]

## 2023-02-25 ENCOUNTER — MYC MEDICAL ADVICE (OUTPATIENT)
Dept: INTERNAL MEDICINE | Facility: CLINIC | Age: 38
End: 2023-02-25
Payer: COMMERCIAL

## 2023-02-25 DIAGNOSIS — R41.840 DIFFICULTY CONCENTRATING: ICD-10-CM

## 2023-02-25 DIAGNOSIS — G93.32 CHRONIC FATIGUE SYNDROME: ICD-10-CM

## 2023-02-27 RX ORDER — DEXTROAMPHETAMINE SACCHARATE, AMPHETAMINE ASPARTATE, DEXTROAMPHETAMINE SULFATE AND AMPHETAMINE SULFATE 1.25; 1.25; 1.25; 1.25 MG/1; MG/1; MG/1; MG/1
5 TABLET ORAL DAILY
Qty: 30 TABLET | Refills: 0 | Status: CANCELLED | OUTPATIENT
Start: 2023-04-25

## 2023-02-27 RX ORDER — DEXTROAMPHETAMINE SACCHARATE, AMPHETAMINE ASPARTATE MONOHYDRATE, DEXTROAMPHETAMINE SULFATE AND AMPHETAMINE SULFATE 1.25; 1.25; 1.25; 1.25 MG/1; MG/1; MG/1; MG/1
5 CAPSULE, EXTENDED RELEASE ORAL DAILY
Qty: 30 CAPSULE | Refills: 0 | Status: CANCELLED | OUTPATIENT
Start: 2023-02-27

## 2023-02-27 RX ORDER — DEXTROAMPHETAMINE SACCHARATE, AMPHETAMINE ASPARTATE MONOHYDRATE, DEXTROAMPHETAMINE SULFATE AND AMPHETAMINE SULFATE 1.25; 1.25; 1.25; 1.25 MG/1; MG/1; MG/1; MG/1
5 CAPSULE, EXTENDED RELEASE ORAL DAILY
Qty: 30 CAPSULE | Refills: 0 | Status: CANCELLED | OUTPATIENT
Start: 2023-03-28

## 2023-02-27 RX ORDER — DEXTROAMPHETAMINE SACCHARATE, AMPHETAMINE ASPARTATE, DEXTROAMPHETAMINE SULFATE AND AMPHETAMINE SULFATE 1.25; 1.25; 1.25; 1.25 MG/1; MG/1; MG/1; MG/1
5 TABLET ORAL DAILY
Qty: 30 TABLET | Refills: 0 | Status: CANCELLED | OUTPATIENT
Start: 2023-03-25

## 2023-02-27 NOTE — TELEPHONE ENCOUNTER
Cm crystalg:   Good morning Nadya!  The medication that you recently prescribed and sent to a mail delivery said that they cannot fulfill this prescription and it's not transferable.  Can you please send that prescription to another pharmacy? Anything in my area is fine. If it's available and the [facility] then that works for me too.  Thank you!      Medication is valentine'd up for verification and approval, if appropriate.       Thank you,  Rolando Feliciano Jr., CMA on 2/27/2023 at 8:07 AM

## 2023-02-28 ENCOUNTER — TELEPHONE (OUTPATIENT)
Dept: INTERNAL MEDICINE | Facility: CLINIC | Age: 38
End: 2023-02-28
Payer: COMMERCIAL

## 2023-02-28 NOTE — TELEPHONE ENCOUNTER
Per telephone encounter today   Pt wanted Rx sent to [facility] pharmacy- Rx's sent to [facility] pharmacy on 2/25

## 2023-02-28 NOTE — TELEPHONE ENCOUNTER
Patient calling to request that the recent Rx for Adderall immediate release be sent to the [facility] pharmacy on file as the [facility] pharmacy does not have any in stock and are not sure when they will have it on hand.  Patient stating that all future scripts can also be sent to [facility] pharmacy on file as well if that's possible.  Patient also notes that she does not currently have any medication left and is asking that this be expedited if possible.    Any questions, patient can be reached at [phone].  Okay to leave detailed message if needed.

## 2023-03-01 RX ORDER — DEXTROAMPHETAMINE SACCHARATE, AMPHETAMINE ASPARTATE, DEXTROAMPHETAMINE SULFATE AND AMPHETAMINE SULFATE 1.25; 1.25; 1.25; 1.25 MG/1; MG/1; MG/1; MG/1
5 TABLET ORAL DAILY
Qty: 30 TABLET | Refills: 0 | Status: SHIPPED | OUTPATIENT
Start: 2023-03-01 | End: 2023-04-07

## 2023-03-01 ASSESSMENT — ENCOUNTER SYMPTOMS
DIZZINESS: 1
STIFFNESS: 0
FATIGUE: 1
POLYPHAGIA: 1
NIGHT SWEATS: 0
NECK PAIN: 1
DECREASED APPETITE: 0
NUMBNESS: 0
PARALYSIS: 0
TREMORS: 0
HYPOTENSION: 0
WEIGHT GAIN: 1
SYNCOPE: 0
MYALGIAS: 1
SLEEP DISTURBANCES DUE TO BREATHING: 0
ARTHRALGIAS: 0
ORTHOPNEA: 0
LIGHT-HEADEDNESS: 1
SWOLLEN GLANDS: 1
POLYDIPSIA: 0
NAIL CHANGES: 1
EXERCISE INTOLERANCE: 1
BACK PAIN: 1
INCREASED ENERGY: 0
POOR WOUND HEALING: 0
MEMORY LOSS: 0
SKIN CHANGES: 1
CHILLS: 1
LEG PAIN: 0
MUSCLE CRAMPS: 1
WEIGHT LOSS: 0
TINGLING: 1
WEAKNESS: 1
JOINT SWELLING: 0
DISTURBANCES IN COORDINATION: 1
MUSCLE WEAKNESS: 1
HALLUCINATIONS: 0
HEADACHES: 1
FEVER: 0
LOSS OF CONSCIOUSNESS: 0
ALTERED TEMPERATURE REGULATION: 1
PALPITATIONS: 0
HYPERTENSION: 0
SPEECH CHANGE: 0
SEIZURES: 0
BRUISES/BLEEDS EASILY: 1

## 2023-03-07 ENCOUNTER — VIRTUAL VISIT (OUTPATIENT)
Dept: ENDOCRINOLOGY | Facility: CLINIC | Age: 38
End: 2023-03-07
Payer: COMMERCIAL

## 2023-03-07 ENCOUNTER — PRE VISIT (OUTPATIENT)
Dept: ENDOCRINOLOGY | Facility: CLINIC | Age: 38
End: 2023-03-07

## 2023-03-07 DIAGNOSIS — G93.32 CHRONIC FATIGUE SYNDROME: ICD-10-CM

## 2023-03-07 DIAGNOSIS — R53.83 OTHER FATIGUE: ICD-10-CM

## 2023-03-07 DIAGNOSIS — E03.9 HYPOTHYROIDISM, UNSPECIFIED TYPE: ICD-10-CM

## 2023-03-07 PROCEDURE — 99205 OFFICE O/P NEW HI 60 MIN: CPT | Mod: VID | Performed by: STUDENT IN AN ORGANIZED HEALTH CARE EDUCATION/TRAINING PROGRAM

## 2023-03-07 NOTE — PROGRESS NOTES
Endocrinology Clinic Visit 3/7/2023      Video-Visit Details    Type of service:  Video Visit    Joined the call at 3/7/2023, 8:08:08 am.  Left the call at 3/7/2023, 8:51:35 am.    Originating Location (pt. Location): Home        Distant Location (provider location):  Off-site    Mode of Communication:  Video Conference via [employer]    Physician has received verbal consent for a Video Visit from the patient? Yes    I spent a total of 60 minutes on the date of encounter reviewing medical records, evaluating the patient, coordinating care and documenting in the EHR, as detailed above.      NAME:  Katiuska De Jesus  PCP:  Nadya Villalpando  MRN:  [id number]  Reason for Consult:  Fatigue   Requesting Provider:  Nadya Villalpando    Chief Complaint     Chief Complaint   Patient presents with     Video Visit       History of Present Illness     Katiuska De Jesus is a 37 year old female who is seen in video visit for chronic fatigue.      She has been struggling with fatigue for so long, she has not been able to stay awake. She feels sleepy all day despite sleeping well at night. She was not able to stay awake to keep a job. She has been having difficulties loosing weight. Feels not well after exercise.     S/p hysterectomy 2020 for dysmenorrhea and menorrhagia .     ROS:  She was diagnosed with IBS and was started on amitriptyline.  She denied any CP, sob, palpitation  Always had problem with cold, mainly cold extremities. Lately not able to stay warm. extremities turns blue on cold exposure.  No headache, blurry vision goes away after rubbing eyes. She has migraines every month.  Feeling like she has hand weakness, dropping things.  Fell down one time not while getting up from chair, felt weak  Purple stretch marks abdomen, hips and arms for 20 years  Easy bruising, new  Sun sensitivity, skin rash when exposed to sun  Mouth sores 4 times a year  Gained 20 lbs in the past year. She was above 200 lbs in college and was  able to loose wt.  She does light wt training dn walks her dogs out, feels nauseous if she does more than that.  Chronic back pain.  She gets severe dizziness and LHD especially from standing position    She recently started Adderall prescribed by her PCP to help with fatigue, which did help a little bit. She is on gabapentin for low back pain after falling. She is on zoloft for anxiety.    She is on levothyroxine since 2013. Labs checked were only TSH without FT4. TSH was 6.4 on 12/2013. She is currently on 88 mcg daily. On 4/2022 was 1.92.    Family hx: COPD in grandmother , great grandmother hypothyroidism. Possible MS in brother.    Social: she is an . She lives alone. She does not smoke. Rarely alcohol. She tried edibles to help with back pain. No IV or illicit drugs.       Problem List     Patient Active Problem List   Diagnosis     Hypothyroidism     Depression, unspecified depression type     Dysmenorrhea     Chronic fatigue syndrome     IBS (irritable bowel syndrome)     Lymphopenia     Myalgia     Polyarthralgia     LETICIA positive     Fibromyalgia     Chronic low back pain     Menorrhagia     Thrombocytopenia (H)     Class 1 obesity due to excess calories without serious comorbidity with body mass index (BMI) of 32.0 to 32.9 in adult     INA (generalized anxiety disorder)        Medications     Current Outpatient Medications   Medication     acetaminophen (TYLENOL) 500 MG tablet     albuterol (PROAIR HFA/PROVENTIL HFA/VENTOLIN HFA) 108 (90 Base) MCG/ACT inhaler     amitriptyline (ELAVIL) 10 MG tablet     amphetamine-dextroamphetamine (ADDERALL XR) 5 MG 24 hr capsule     [START ON 3/28/2023] amphetamine-dextroamphetamine (ADDERALL XR) 5 MG 24 hr capsule     amphetamine-dextroamphetamine (ADDERALL) 5 MG tablet     [START ON 3/25/2023] amphetamine-dextroamphetamine (ADDERALL) 5 MG tablet     [START ON 4/25/2023] amphetamine-dextroamphetamine (ADDERALL) 5 MG tablet     fluticasone (FLONASE) 50 MCG/ACT  spray     gabapentin (NEURONTIN) 300 MG capsule     levothyroxine (SYNTHROID/LEVOTHROID) 88 MCG tablet     sertraline (ZOLOFT) 50 MG tablet     No current facility-administered medications for this visit.        Allergies     Allergies   Allergen Reactions     Gluten Meal      Ibuprofen Sodium Cramps and GI Disturbance       Medical / Surgical History     Past Medical History:   Diagnosis Date     Depression      Dysphagia 6/22/2020     Fatigue      Hypothyroid      Hypothyroidism      IBS (irritable bowel syndrome)      Migraine      Past Surgical History:   Procedure Laterality Date     LAPAROSCOPIC ASSISTED HYSTERECTOMY VAGINAL N/A 06/23/2022    Procedure: LAPAROSCOPIC ASSISTED VAGINAL HYSTERECTOMY;  Surgeon: Rayna Tran MD;  Location: [facility] [place]     LAPAROSCOPIC SALPINGECTOMY Bilateral 06/23/2022    Procedure: BILATERAL SALPINGECTOMY;  Surgeon: Rayna Tran MD;  Location: [facility] [place]       Social History     Social History     Socioeconomic History     Marital status: Single     Spouse name: Not on file     Number of children: Not on file     Years of education: Not on file     Highest education level: Not on file   Occupational History     Occupation:    Tobacco Use     Smoking status: Never     Smokeless tobacco: Never     Tobacco comments:     smokers in home   Vaping Use     Vaping Use: Never used   Substance and Sexual Activity     Alcohol use: Yes     Comment: Alcoholic Drinks/day: rarely 4/year     Drug use: Never     Sexual activity: Yes     Partners: Male   Other Topics Concern     Parent/sibling w/ CABG, MI or angioplasty before 65F 55M? No   Social History Narrative     Not on file     Social Determinants of Health     Financial Resource Strain: Not on file   Food Insecurity: Not on file   Transportation Needs: Not on file   Physical Activity: Not on file   Stress: Not on file   Social Connections: Not on file   Intimate Partner Violence: Not on file    Housing Stability: Not on file       Family History     Family History   Problem Relation Age of Onset     Thyroid Disease Other      Substance Abuse Father         EtOH     Thyroid Disease Maternal Grandmother      Cancer Brother         tumor in his thigh     Other Cancer Brother      Thyroid Disease Mother      Irritable Bowel Syndrome Sister      Depression Sister      No Known Problems Brother      Cancer Brother          Tumor  in this thigh     Alcoholism Brother      Depression Brother      Substance Abuse Brother      Early Death Brother      Snoring Brother      Arthritis Maternal Grandmother      Asthma Maternal Grandmother      Diabetes Maternal Grandmother      Arthritis Mother      Depression Mother      Alcoholism Father        ROS     12 ROS completed, pertinent positive and negative in HPI  Answers for HPI/ROS submitted by the patient on 3/1/2023  General Symptoms: Yes  Skin Symptoms: Yes  HENT Symptoms: No  EYE SYMPTOMS: No  HEART SYMPTOMS: Yes, she meant poor circulation given the cold extremities.  LUNG SYMPTOMS: No  INTESTINAL SYMPTOMS: No  URINARY SYMPTOMS: No  GYNECOLOGIC SYMPTOMS: No  BREAST SYMPTOMS: No  SKELETAL SYMPTOMS: Yes  BLOOD SYMPTOMS: Yes  NERVOUS SYSTEM SYMPTOMS: Yes  MENTAL HEALTH SYMPTOMS: No  Fever: No  Loss of appetite: No  Weight loss: No  Weight gain: Yes  Fatigue: Yes  Night sweats: No  Chills: Yes  Increased stress: Yes  Excessive hunger: Yes  Excessive thirst: No  Feeling hot or cold when others believe the temperature is normal: Yes  Loss of height: No  Post-operative complications: No  Surgical site pain: No  Hallucinations: No  Change in or Loss of Energy: No  Hyperactivity: No  Confusion: Yes  Changes in hair: No  Changes in moles/birth marks: Yes  Itching: Yes  Rashes: No  Changes in nails: Yes  Acne: Yes  Hair in places you don't want it: No  Change in facial hair: No  Warts: No  Non-healing sores: No  Scarring: No  Flaking of skin: Yes  Color changes of hands/feet  in cold : Yes  Sun sensitivity: Yes  Skin thickening: No  Chest pain or pressure: No  Fast or irregular heartbeat: No  Pain in legs with walking: No  Trouble breathing while lying down: No  Fingers or toes appear blue: Yes  High blood pressure: No  Low blood pressure: No  Fainting: No  Murmurs: No  Pacemaker: No  Varicose veins: Yes  Wake up at night with shortness of breath: No  Light-headedness: Yes  Exercise intolerance: Yes  Back pain: Yes  Muscle aches: Yes  Neck pain: Yes  Swollen joints: No  Joint pain: No  Bone pain: No  Muscle cramps: Yes  Muscle weakness: Yes  Joint stiffness: No  Bone fracture: No  Edema or swelling: No  Anemia: No  Swollen glands: Yes  Easy bleeding or bruising: Yes  Trouble with coordination: Yes  Dizziness or trouble with balance: Yes  Fainting or black-out spells: No  Memory loss: No  Headache: Yes  Seizures: No  Speech problems: No  Tingling: Yes  Tremor: No  Weakness: Yes  Difficulty walking: No  Paralysis: No  Numbness: No      Physical Exam   LMP  (LMP Unknown)    GENERAL: Healthy, alert and no distress  EYES: Eyes grossly normal to inspection.  No discharge or erythema, or obvious scleral/conjunctival abnormalities.  RESP: No audible wheeze, cough, or visible cyanosis.  No visible retractions or increased work of breathing.    SKIN: Visible skin clear. No significant rash, abnormal pigmentation or lesions.  NEURO: Cranial nerves grossly intact.  Mentation and speech appropriate for age.  PSYCH: Mentation appears normal, affect normal/bright, judgement and insight intact, normal speech and appearance well-groomed.     Labs/Imaging     Pertinent Labs were reviewed and updated in EPIC and discussed briefly.  Radiology Results were  reviewed and updated in EPIC and discussed briefly.    Summary of recent findings:   Lab Results   Component Value Date    A1C 5.1 10/18/2019       TSH   Date Value Ref Range Status   04/06/2022 1.92 0.30 - 5.00 uIU/mL Final   09/01/2021 2.24 0.30 - 5.00  uIU/mL Final   09/28/2020 1.11 0.30 - 5.00 uIU/mL Final   10/18/2019 0.43 0.30 - 5.00 uIU/mL Final   03/20/2019 2.27 0.30 - 5.00 uIU/mL Final   06/07/2017 1.54 0.40 - 4.00 mU/L Final   05/29/2015 1.91 0.40 - 4.00 mU/L Final   05/22/2014 2.8 mcU/mL Final   12/16/2013 6.4 mcU/mL Final   11/11/2010 3.52 0.4 - 5.0 mU/L Final       Creatinine   Date Value Ref Range Status   06/17/2022 0.80 0.60 - 1.10 mg/dL Final   12/16/2013 0.80 mg/dL Final       Recent Labs   Lab Test 09/01/21  0919 10/18/19  0847   CHOL 157 126   HDL 46* 47*    72   TRIG 46 34       No results found for: [id number], [id number], [id number]    I personally reviewed the patient's outside records from [employer] EMR and Care Everywhere. Summary of pertinent findings in HPI.    Impression / Plan     1. Chronic fatigue  2. Inability to loose wt    She does not have any overt clinical endocrinopathy. She has no risk factors for AI. Given her severe symptoms along with minor symptoms of cushing ( easy bruising, purple stretch marks) would screen for cushing. We will also screen for hormonal deficiencies that might cause fatigue, AI. We discussed that it is very likely that the labs will come negative and given her low pretest probability no need for further testing. She will continue working with her PCP, consider sleep medicine referral.    3. Chronic hypothyroidism  Initial work up with TSH only. She has been on lt4 since 2013. Last tft 4/2022. Due for tft check    4. Possible raynaud's  She will discuss management with PCP      Test and/or medications prescribed today:  No orders of the defined types were placed in this encounter.        Follow up: pending labs      Maria Antonia Bell MD  Endocrinology, Diabetes and Metabolism  [facility]

## 2023-03-07 NOTE — NURSING NOTE
Is the patient currently in the state of [place]? YES    Visit mode:VIDEO    If the visit is dropped, the patient can be reconnected by: VIDEO VISIT: Text to cell phone: [phone]    Will anyone else be joining the visit? NO      How would you like to obtain your AVS? MyChart    Are changes needed to the allergy or medication list? NO    Reason for visit: Consult, New Endocrine    Deonte Harrison, JUAN ANTONIO/CMA

## 2023-03-07 NOTE — LETTER
3/7/2023       RE: Katiuska De Jesus  [address]  [place] [address]     Dear Colleague,    Thank you for referring your patient, Katiuska De Jesus, to the [facility] ENDOCRINOLOGY [facility]INIC [place] [facility]. Please see a copy of my visit note below.    Endocrinology Clinic Visit 3/7/2023      Video-Visit Details    Type of service:  Video Visit    Joined the call at 3/7/2023, 8:08:08 am.  Left the call at 3/7/2023, 8:51:35 am.    Originating Location (pt. Location): Home        Distant Location (provider location):  Off-site    Mode of Communication:  Video Conference via [employer]    Physician has received verbal consent for a Video Visit from the patient? Yes    I spent a total of 60 minutes on the date of encounter reviewing medical records, evaluating the patient, coordinating care and documenting in the EHR, as detailed above.      NAME:  Katiuska De Jesus  PCP:  Nadya Villalpando  MRN:  [id number]  Reason for Consult:  Fatigue   Requesting Provider:  Nadya Villalpando    Chief Complaint     Chief Complaint   Patient presents with     Video Visit       History of Present Illness     Katiuska De Jesus is a 37 year old female who is seen in video visit for chronic fatigue.      She has been struggling with fatigue for so long, she has not been able to stay awake. She feels sleepy all day despite sleeping well at night. She was not able to stay awake to keep a job. She has been having difficulties loosing weight. Feels not well after exercise.     S/p hysterectomy 2020 for dysmenorrhea and menorrhagia .     ROS:  She was diagnosed with IBS and was started on amitriptyline.  She denied any CP, sob, palpitation  Always had problem with cold, mainly cold extremities. Lately not able to stay warm. extremities turns blue on cold exposure.  No headache, blurry vision goes away after rubbing eyes. She has migraines every month.  Feeling like she has hand  weakness, dropping things.  Fell down one time not while getting up from chair, felt weak  Purple stretch marks abdomen, hips and arms for 20 years  Easy bruising, new  Sun sensitivity, skin rash when exposed to sun  Mouth sores 4 times a year  Gained 20 lbs in the past year. She was above 200 lbs in college and was able to loose wt.  She does light wt training dn walks her dogs out, feels nauseous if she does more than that.  Chronic back pain.  She gets severe dizziness and LHD especially from standing position    She recently started Adderall prescribed by her PCP to help with fatigue, which did help a little bit. She is on gabapentin for low back pain after falling. She is on zoloft for anxiety.    She is on levothyroxine since 2013. Labs checked were only TSH without FT4. TSH was 6.4 on 12/2013. She is currently on 88 mcg daily. On 4/2022 was 1.92.    Family hx: COPD in grandmother , great grandmother hypothyroidism. Possible MS in brother.    Social: she is an . She lives alone. She does not smoke. Rarely alcohol. She tried edibles to help with back pain. No IV or illicit drugs.       Problem List     Patient Active Problem List   Diagnosis     Hypothyroidism     Depression, unspecified depression type     Dysmenorrhea     Chronic fatigue syndrome     IBS (irritable bowel syndrome)     Lymphopenia     Myalgia     Polyarthralgia     LETICIA positive     Fibromyalgia     Chronic low back pain     Menorrhagia     Thrombocytopenia (H)     Class 1 obesity due to excess calories without serious comorbidity with body mass index (BMI) of 32.0 to 32.9 in adult     INA (generalized anxiety disorder)        Medications     Current Outpatient Medications   Medication     acetaminophen (TYLENOL) 500 MG tablet     albuterol (PROAIR HFA/PROVENTIL HFA/VENTOLIN HFA) 108 (90 Base) MCG/ACT inhaler     amitriptyline (ELAVIL) 10 MG tablet     amphetamine-dextroamphetamine (ADDERALL XR) 5 MG 24 hr capsule     [START ON  3/28/2023] amphetamine-dextroamphetamine (ADDERALL XR) 5 MG 24 hr capsule     amphetamine-dextroamphetamine (ADDERALL) 5 MG tablet     [START ON 3/25/2023] amphetamine-dextroamphetamine (ADDERALL) 5 MG tablet     [START ON 4/25/2023] amphetamine-dextroamphetamine (ADDERALL) 5 MG tablet     fluticasone (FLONASE) 50 MCG/ACT spray     gabapentin (NEURONTIN) 300 MG capsule     levothyroxine (SYNTHROID/LEVOTHROID) 88 MCG tablet     sertraline (ZOLOFT) 50 MG tablet     No current facility-administered medications for this visit.        Allergies     Allergies   Allergen Reactions     Gluten Meal      Ibuprofen Sodium Cramps and GI Disturbance       Medical / Surgical History     Past Medical History:   Diagnosis Date     Depression      Dysphagia 6/22/2020     Fatigue      Hypothyroid      Hypothyroidism      IBS (irritable bowel syndrome)      Migraine      Past Surgical History:   Procedure Laterality Date     LAPAROSCOPIC ASSISTED HYSTERECTOMY VAGINAL N/A 06/23/2022    Procedure: LAPAROSCOPIC ASSISTED VAGINAL HYSTERECTOMY;  Surgeon: Rayna Tran MD;  Location: [facility] [place]     LAPAROSCOPIC SALPINGECTOMY Bilateral 06/23/2022    Procedure: BILATERAL SALPINGECTOMY;  Surgeon: Rayna Tran MD;  Location: [facility] [place]       Social History     Social History     Socioeconomic History     Marital status: Single     Spouse name: Not on file     Number of children: Not on file     Years of education: Not on file     Highest education level: Not on file   Occupational History     Occupation:    Tobacco Use     Smoking status: Never     Smokeless tobacco: Never     Tobacco comments:     smokers in home   Vaping Use     Vaping Use: Never used   Substance and Sexual Activity     Alcohol use: Yes     Comment: Alcoholic Drinks/day: rarely 4/year     Drug use: Never     Sexual activity: Yes     Partners: Male   Other Topics Concern     Parent/sibling w/ CABG, MI or angioplasty before 65F 55M?  No   Social History Narrative     Not on file     Social Determinants of Health     Financial Resource Strain: Not on file   Food Insecurity: Not on file   Transportation Needs: Not on file   Physical Activity: Not on file   Stress: Not on file   Social Connections: Not on file   Intimate Partner Violence: Not on file   Housing Stability: Not on file       Family History     Family History   Problem Relation Age of Onset     Thyroid Disease Other      Substance Abuse Father         EtOH     Thyroid Disease Maternal Grandmother      Cancer Brother         tumor in his thigh     Other Cancer Brother      Thyroid Disease Mother      Irritable Bowel Syndrome Sister      Depression Sister      No Known Problems Brother      Cancer Brother          Tumor  in this thigh     Alcoholism Brother      Depression Brother      Substance Abuse Brother      Early Death Brother      Snoring Brother      Arthritis Maternal Grandmother      Asthma Maternal Grandmother      Diabetes Maternal Grandmother      Arthritis Mother      Depression Mother      Alcoholism Father        ROS     12 ROS completed, pertinent positive and negative in HPI  Answers for HPI/ROS submitted by the patient on 3/1/2023  General Symptoms: Yes  Skin Symptoms: Yes  HENT Symptoms: No  EYE SYMPTOMS: No  HEART SYMPTOMS: Yes, she meant poor circulation given the cold extremities.  LUNG SYMPTOMS: No  INTESTINAL SYMPTOMS: No  URINARY SYMPTOMS: No  GYNECOLOGIC SYMPTOMS: No  BREAST SYMPTOMS: No  SKELETAL SYMPTOMS: Yes  BLOOD SYMPTOMS: Yes  NERVOUS SYSTEM SYMPTOMS: Yes  MENTAL HEALTH SYMPTOMS: No  Fever: No  Loss of appetite: No  Weight loss: No  Weight gain: Yes  Fatigue: Yes  Night sweats: No  Chills: Yes  Increased stress: Yes  Excessive hunger: Yes  Excessive thirst: No  Feeling hot or cold when others believe the temperature is normal: Yes  Loss of height: No  Post-operative complications: No  Surgical site pain: No  Hallucinations: No  Change in or Loss of  Energy: No  Hyperactivity: No  Confusion: Yes  Changes in hair: No  Changes in moles/birth marks: Yes  Itching: Yes  Rashes: No  Changes in nails: Yes  Acne: Yes  Hair in places you don't want it: No  Change in facial hair: No  Warts: No  Non-healing sores: No  Scarring: No  Flaking of skin: Yes  Color changes of hands/feet in cold : Yes  Sun sensitivity: Yes  Skin thickening: No  Chest pain or pressure: No  Fast or irregular heartbeat: No  Pain in legs with walking: No  Trouble breathing while lying down: No  Fingers or toes appear blue: Yes  High blood pressure: No  Low blood pressure: No  Fainting: No  Murmurs: No  Pacemaker: No  Varicose veins: Yes  Wake up at night with shortness of breath: No  Light-headedness: Yes  Exercise intolerance: Yes  Back pain: Yes  Muscle aches: Yes  Neck pain: Yes  Swollen joints: No  Joint pain: No  Bone pain: No  Muscle cramps: Yes  Muscle weakness: Yes  Joint stiffness: No  Bone fracture: No  Edema or swelling: No  Anemia: No  Swollen glands: Yes  Easy bleeding or bruising: Yes  Trouble with coordination: Yes  Dizziness or trouble with balance: Yes  Fainting or black-out spells: No  Memory loss: No  Headache: Yes  Seizures: No  Speech problems: No  Tingling: Yes  Tremor: No  Weakness: Yes  Difficulty walking: No  Paralysis: No  Numbness: No      Physical Exam   LMP  (LMP Unknown)    GENERAL: Healthy, alert and no distress  EYES: Eyes grossly normal to inspection.  No discharge or erythema, or obvious scleral/conjunctival abnormalities.  RESP: No audible wheeze, cough, or visible cyanosis.  No visible retractions or increased work of breathing.    SKIN: Visible skin clear. No significant rash, abnormal pigmentation or lesions.  NEURO: Cranial nerves grossly intact.  Mentation and speech appropriate for age.  PSYCH: Mentation appears normal, affect normal/bright, judgement and insight intact, normal speech and appearance well-groomed.     Labs/Imaging     Pertinent Labs were  reviewed and updated in EPIC and discussed briefly.  Radiology Results were  reviewed and updated in EPIC and discussed briefly.    Summary of recent findings:   Lab Results   Component Value Date    A1C 5.1 10/18/2019       TSH   Date Value Ref Range Status   04/06/2022 1.92 0.30 - 5.00 uIU/mL Final   09/01/2021 2.24 0.30 - 5.00 uIU/mL Final   09/28/2020 1.11 0.30 - 5.00 uIU/mL Final   10/18/2019 0.43 0.30 - 5.00 uIU/mL Final   03/20/2019 2.27 0.30 - 5.00 uIU/mL Final   06/07/2017 1.54 0.40 - 4.00 mU/L Final   05/29/2015 1.91 0.40 - 4.00 mU/L Final   05/22/2014 2.8 mcU/mL Final   12/16/2013 6.4 mcU/mL Final   11/11/2010 3.52 0.4 - 5.0 mU/L Final       Creatinine   Date Value Ref Range Status   06/17/2022 0.80 0.60 - 1.10 mg/dL Final   12/16/2013 0.80 mg/dL Final       Recent Labs   Lab Test 09/01/21  0919 10/18/19  0847   CHOL 157 126   HDL 46* 47*    72   TRIG 46 34       No results found for: [id number], [id number], [id number]    I personally reviewed the patient's outside records from [facility] EMR and Care Everywhere. Summary of pertinent findings in HPI.    Impression / Plan     1. Chronic fatigue  2. Inability to loose wt    She does not have any overt clinical endocrinopathy. She has no risk factors for AI. Given her severe symptoms along with minor symptoms of cushing ( easy bruising, purple stretch marks) would screen for cushing. We will also screen for hormonal deficiencies that might cause fatigue, AI. We discussed that it is very likely that the labs will come negative and given her low pretest probability no need for further testing. She will continue working with her PCP, consider sleep medicine referral.    3. Chronic hypothyroidism  Initial work up with TSH only. She has been on lt4 since 2013. Last tft 4/2022. Due for tft check    4. Possible raynaud's  She will discuss management with PCP      Test and/or medications prescribed today:  No orders of the defined types were placed in this  encounter.        Follow up: pending labs      Maria Antonia Bell MD  Endocrinology, Diabetes and Metabolism  [facility]

## 2023-03-16 ENCOUNTER — VIRTUAL VISIT (OUTPATIENT)
Dept: INTERNAL MEDICINE | Facility: CLINIC | Age: 38
End: 2023-03-16
Payer: COMMERCIAL

## 2023-03-16 DIAGNOSIS — G93.32 CHRONIC FATIGUE SYNDROME: Primary | ICD-10-CM

## 2023-03-16 DIAGNOSIS — R41.840 DIFFICULTY CONCENTRATING: ICD-10-CM

## 2023-03-16 PROCEDURE — 99214 OFFICE O/P EST MOD 30 MIN: CPT | Mod: VID | Performed by: NURSE PRACTITIONER

## 2023-03-16 RX ORDER — DEXTROAMPHETAMINE SACCHARATE, AMPHETAMINE ASPARTATE MONOHYDRATE, DEXTROAMPHETAMINE SULFATE AND AMPHETAMINE SULFATE 1.25; 1.25; 1.25; 1.25 MG/1; MG/1; MG/1; MG/1
5 CAPSULE, EXTENDED RELEASE ORAL DAILY
Qty: 30 CAPSULE | Refills: 0 | Status: CANCELLED | OUTPATIENT
Start: 2023-03-28

## 2023-03-23 ENCOUNTER — LAB (OUTPATIENT)
Dept: LAB | Facility: CLINIC | Age: 38
End: 2023-03-23
Payer: COMMERCIAL

## 2023-03-23 DIAGNOSIS — G93.32 CHRONIC FATIGUE SYNDROME: ICD-10-CM

## 2023-03-23 DIAGNOSIS — E03.9 HYPOTHYROIDISM, UNSPECIFIED TYPE: ICD-10-CM

## 2023-03-23 DIAGNOSIS — Z79.899 LONG-TERM CURRENT USE OF STIMULANT: ICD-10-CM

## 2023-03-23 DIAGNOSIS — R53.83 OTHER FATIGUE: ICD-10-CM

## 2023-03-23 LAB
AMPHETAMINES UR QL SCN: ABNORMAL
BARBITURATES UR QL SCN: ABNORMAL
BENZODIAZ UR QL SCN: ABNORMAL
BZE UR QL SCN: ABNORMAL
CANNABINOIDS UR QL SCN: ABNORMAL
CREAT UR-MCNC: 60.2 MG/DL
HBA1C MFR BLD: 5.1 % (ref 0–5.6)
METHADONE UR QL SCN: ABNORMAL
OPIATES UR QL SCN: ABNORMAL
OXYCODONE UR QL: ABNORMAL
PCP QUAL URINE (ROCHE): ABNORMAL

## 2023-03-23 PROCEDURE — 80307 DRUG TEST PRSMV CHEM ANLYZR: CPT

## 2023-03-23 PROCEDURE — 84443 ASSAY THYROID STIM HORMONE: CPT

## 2023-03-23 PROCEDURE — 82533 TOTAL CORTISOL: CPT | Mod: 90

## 2023-03-23 PROCEDURE — 83036 HEMOGLOBIN GLYCOSYLATED A1C: CPT

## 2023-03-23 PROCEDURE — 82533 TOTAL CORTISOL: CPT

## 2023-03-23 PROCEDURE — 99000 SPECIMEN HANDLING OFFICE-LAB: CPT

## 2023-03-23 PROCEDURE — 36415 COLL VENOUS BLD VENIPUNCTURE: CPT

## 2023-03-23 PROCEDURE — 84305 ASSAY OF SOMATOMEDIN: CPT

## 2023-03-24 ENCOUNTER — APPOINTMENT (OUTPATIENT)
Dept: LAB | Facility: CLINIC | Age: 38
End: 2023-03-24
Payer: COMMERCIAL

## 2023-03-24 LAB
CORTIS SERPL-MCNC: 8.1 UG/DL
IGF-I BLD-MCNC: 216 NG/ML (ref 79–276)
TSH SERPL DL<=0.005 MIU/L-ACNC: 1.69 UIU/ML (ref 0.3–4.2)

## 2023-03-24 PROCEDURE — 82533 TOTAL CORTISOL: CPT | Mod: 90

## 2023-03-24 PROCEDURE — 99000 SPECIMEN HANDLING OFFICE-LAB: CPT

## 2023-03-27 LAB
CORTIS SAL-MCNC: 0.09 UG/DL
CORTIS SAL-MCNC: 0.89 UG/DL

## 2023-04-02 NOTE — PROGRESS NOTES
"Enedelia is a 37 year old who is being evaluated via a billable video visit.      How would you like to obtain your AVS? MyChart  If the video visit is dropped, the invitation should be resent by: Text to cell phone: [phone]  Will anyone else be joining your video visit? No          Assessment & Plan   Problem List Items Addressed This Visit        Nervous and Auditory    Chronic fatigue syndrome - Primary   Other Visit Diagnoses     Difficulty concentrating          Discussed with patient about transitioning to a 10 mg Adderall in the morning rather than taking a 5 mg in the morning and then a short acting 5 mg in the afternoon.  Patient believes that some of her symptoms are because secondary to stress at her work.  We will elect to wait at this time she is a busy season and will wait till things slow down at that point she is going to see if the medication is causing some increase in anxiety or for the need for transition to extended release 10 mg in the morning and discontinuing the 5 short acting in the afternoon.  Patient will contact my office in the next 4 to 6 weeks.             BMI:   Estimated body mass index is 32.28 kg/m  as calculated from the following:    Height as of 1/17/23: 1.651 m (5' 5\").    Weight as of 1/17/23: 88 kg (194 lb).           Nadya Villalpando NP  [facility]    Rigo Mcdaniels is a 37 year old, presenting for the following health issues:  No chief complaint on file.        9/22/2022     8:25 AM   Additional Questions   Roomed by Joselin JAMES             Review of Systems   Unremarkable other than listed above and below         Objective           Vitals:  No vitals were obtained today due to virtual visit.    Physical Exam     RESP: No audible wheeze, cough  PSYCH: judgement and insight intact, normal speech                 Telephone visit 20 minutes    "

## 2023-04-03 DIAGNOSIS — R41.840 DIFFICULTY CONCENTRATING: ICD-10-CM

## 2023-04-03 DIAGNOSIS — M47.26 OSTEOARTHRITIS OF SPINE WITH RADICULOPATHY, LUMBAR REGION: ICD-10-CM

## 2023-04-03 DIAGNOSIS — G93.32 CHRONIC FATIGUE SYNDROME: ICD-10-CM

## 2023-04-04 NOTE — TELEPHONE ENCOUNTER
Former patient of Nadya Villalpando & has not established care with another provider.  Please assign refill request to covering provider per clinic standard process.      Routing refill request to provider for review/approval because:  Drug not on the [facility] refill protocol     amphetamine-dextroamphetamine (ADDERALL) 5 MG tablet  Last Written Prescription Date:  2/22/2023  Last Fill Quantity: 30,  # refills: 0   Last office visit provider:  3/16/2023     gabapentin (NEURONTIN) 300 MG capsule  Last Written Prescription Date:  9/22/2022  Last Fill Quantity: 90,  # refills: 1   Last office visit provider:  3/16/2023       Requested Prescriptions   Pending Prescriptions Disp Refills     gabapentin (NEURONTIN) 300 MG capsule 90 capsule 1     Sig: Take 1 capsule (300 mg) by mouth nightly as needed (pain)       There is no refill protocol information for this order        amphetamine-dextroamphetamine (ADDERALL) 5 MG tablet 30 tablet 0     Sig: Take 1 tablet (5 mg) by mouth daily       There is no refill protocol information for this order          Bhavana Genao RN 04/04/23 2:44 AM

## 2023-04-05 RX ORDER — GABAPENTIN 300 MG/1
300 CAPSULE ORAL
Qty: 90 CAPSULE | Refills: 1 | Status: SHIPPED | OUTPATIENT
Start: 2023-04-05 | End: 2023-10-11

## 2023-04-05 RX ORDER — DEXTROAMPHETAMINE SACCHARATE, AMPHETAMINE ASPARTATE, DEXTROAMPHETAMINE SULFATE AND AMPHETAMINE SULFATE 1.25; 1.25; 1.25; 1.25 MG/1; MG/1; MG/1; MG/1
5 TABLET ORAL DAILY
Qty: 30 TABLET | Refills: 0 | Status: SHIPPED | OUTPATIENT
Start: 2023-04-05 | End: 2023-04-07

## 2023-04-07 ENCOUNTER — OFFICE VISIT (OUTPATIENT)
Dept: INTERNAL MEDICINE | Facility: CLINIC | Age: 38
End: 2023-04-07
Payer: COMMERCIAL

## 2023-04-07 VITALS
WEIGHT: 195.8 LBS | HEART RATE: 84 BPM | HEIGHT: 65 IN | SYSTOLIC BLOOD PRESSURE: 102 MMHG | BODY MASS INDEX: 32.62 KG/M2 | DIASTOLIC BLOOD PRESSURE: 66 MMHG | RESPIRATION RATE: 20 BRPM | OXYGEN SATURATION: 98 %

## 2023-04-07 DIAGNOSIS — R41.840 DIFFICULTY CONCENTRATING: ICD-10-CM

## 2023-04-07 DIAGNOSIS — M54.6 ACUTE BILATERAL THORACIC BACK PAIN: Primary | ICD-10-CM

## 2023-04-07 DIAGNOSIS — G93.32 CHRONIC FATIGUE SYNDROME: ICD-10-CM

## 2023-04-07 PROCEDURE — 99214 OFFICE O/P EST MOD 30 MIN: CPT | Performed by: NURSE PRACTITIONER

## 2023-04-07 RX ORDER — DEXTROAMPHETAMINE SACCHARATE, AMPHETAMINE ASPARTATE MONOHYDRATE, DEXTROAMPHETAMINE SULFATE AND AMPHETAMINE SULFATE 2.5; 2.5; 2.5; 2.5 MG/1; MG/1; MG/1; MG/1
10 CAPSULE, EXTENDED RELEASE ORAL DAILY
Qty: 30 CAPSULE | Refills: 0 | Status: SHIPPED | OUTPATIENT
Start: 2023-04-07 | End: 2023-05-15

## 2023-04-07 ASSESSMENT — PATIENT HEALTH QUESTIONNAIRE - PHQ9
10. IF YOU CHECKED OFF ANY PROBLEMS, HOW DIFFICULT HAVE THESE PROBLEMS MADE IT FOR YOU TO DO YOUR WORK, TAKE CARE OF THINGS AT HOME, OR GET ALONG WITH OTHER PEOPLE: SOMEWHAT DIFFICULT
SUM OF ALL RESPONSES TO PHQ QUESTIONS 1-9: 3
SUM OF ALL RESPONSES TO PHQ QUESTIONS 1-9: 3

## 2023-04-07 NOTE — PROGRESS NOTES
"  Assessment & Plan   Problem List Items Addressed This Visit        Nervous and Auditory    Chronic fatigue syndrome    Relevant Medications    amphetamine-dextroamphetamine (ADDERALL XR) 10 MG 24 hr capsule   Other Visit Diagnoses     Acute bilateral thoracic back pain    -  Primary    Relevant Orders    Physical Therapy Referral    Difficulty concentrating        Relevant Medications    amphetamine-dextroamphetamine (ADDERALL XR) 10 MG 24 hr capsule      Will transition from 5mg XR in the morning and short acting 5mg in the afternoon to 10mg XR daily and discontinue the 5 mg short acting.      Referral to PT for mid back discomfort and tingling sensation in the mid back without injury.       BMI:   Estimated body mass index is 32.58 kg/m  as calculated from the following:    Height as of this encounter: 1.651 m (5' 5\").    Weight as of this encounter: 88.8 kg (195 lb 12.8 oz).       Nadya Villalpando NP  [facility] RANDY Mcdaniels is a 37 year old, presenting for the following health issues:  History of Present Illness (Spine is tingling, left side seems to be more sensitive )        9/22/2022     8:25 AM   Additional Questions   Roomed by Joselin VALENTE     History of Present Illness       Reason for visit:  Follow up test results, refill rx and left side of head behind ear question    She eats 4 or more servings of fruits and vegetables daily.She consumes 0 sweetened beverage(s) daily.She exercises with enough effort to increase her heart rate 10 to 19 minutes per day.  She exercises with enough effort to increase her heart rate 3 or less days per week.   She is taking medications regularly.    Today's PHQ-9         PHQ-9 Total Score: 3    PHQ-9 Q9 Thoughts of better off dead/self-harm past 2 weeks :   Not at all    How difficult have these problems made it for you to do your work, take care of things at home, or get along with other people: Somewhat difficult        Review of Systems " "  Unremarkable other than listed above and below         Objective    /66 (BP Location: Right arm, Patient Position: Sitting, Cuff Size: Adult Large)   Pulse 84   Resp 20   Ht 1.651 m (5' 5\")   Wt 88.8 kg (195 lb 12.8 oz)   LMP  (LMP Unknown)   SpO2 98%   BMI 32.58 kg/m    Body mass index is 32.58 kg/m .  Physical Exam   GENERAL: healthy, alert and no distress  EYES: Eyes grossly normal to inspection,  conjunctivae and sclerae normal  RESP:repsirations regular nonlabored   MS: back assessed in the seated position, mild discomfort thoracic spine   NEURO: Normal strength and tone, mentation intact and speech normal  PSYCH: mentation appears normal, affect normal/bright    Lab on 03/23/2023   Component Date Value Ref Range Status     TSH 03/23/2023 1.69  0.30 - 4.20 uIU/mL Final     Cortisol 03/23/2023 8.1    ug/dL Final    6 months and older:  8 AM Cortisol Reference Range:  4-22 ug/dL   4 PM Cortisol Reference Range:  3-17 ug/dL    8 hrs post 1 mg dexamethasone given at midnight: < 5  g/dL     Hemoglobin A1C 03/23/2023 5.1  0.0 - 5.6 % Final    Normal <5.7%   Prediabetes 5.7-6.4%    Diabetes 6.5% or higher     Note: Adopted from ADA consensus guidelines.     Insulin Like Growth Factor 1 03/23/2023 216  79 - 276 ng/mL Final     Cortisol Saliva 03/23/2023 0.086  ug/dL Final    Comment: INTERPRETIVE INFORMATION: Cortisol, Saliva    For collection at 2300 hr. the normal cortisol   concentration is less than 0.112 ug/dL.  Patients with   Cushings Syndrome have concentrations of 0.112 ug/dL or   greater.                     a.m. ([id number])      p.m. (noon-1800)  Males    2.5-7 years   0.034-0.645 ug/dL    0.053-0.607 ug/dL     8-11 years   0.084-0.839 ug/dL    less than 0.215 ug/dL    12-18 years   0.021-0.883 ug/dL    less than 0.259 ug/dL    19-30 years   0.112-0.743 ug/dL    less than 0.308 ug/dL    31-50 years   0.122-1.551 ug/dL    less than 0.359 ug/dL    51 and older  0.112-0.812 ug/dL    less than " 0.228 ug/dL    Females    2.5-7 years   0.034-0.645 ug/dL    0.053-0.607 ug/dL     8-11 years   0.084-0.839 ug/dL    less than 0.215 ug/dL    12-18 years   0.021-0.883 ug/dL    less than 0.259 ug/dL    19-30 years   0.272-1.348 ug/dL    less than 0.359 ug/dL    31-50 years   0.094-1.515 ug/dL    less than 0.181 ug/dL    51 and older  0.149-0.739 ug/dL    0.022-0.254                            ug/dL  Performed by [facility],  [address] [place] [address] [phone]  [email or web address], [address] Bautista MD, PHD, Lab. Director     Amphetamines Urine 03/23/2023 Screen Positive (A)  Screen Negative Final    Cutoff for a positive amphetamine is 500 ng/mL or greater.   This is an unconfirmed screening result to be used for medical purposes only.     Benzodiazepine Urine 03/23/2023 Screen Negative  Screen Negative Final    Cutoff for a negative benzodiazepine is less than 100 ng/mL.     Opiates Urine 03/23/2023 Screen Negative  Screen Negative Final    Cutoff for a negative opiate is less than 300 ng/mL.     PCP Urine 03/23/2023 Screen Negative  Screen Negative Final    Cutoff for a negative PCP is less than 25 ng/mL.     Cannabinoids Urine 03/23/2023 Screen Negative  Screen Negative Final    Cutoff for a negative cannabinoid is less than 50 ng/mL.     Barbituates Urine 03/23/2023 Screen Negative  Screen Negative Final    Cutoff for a negative barbiturate is less than 200 ng/mL.     Cocaine Urine 03/23/2023 Screen Negative  Screen Negative Final    Cutoff for a negative cocaine is less than 300 ng/mL.     Methadone Urine 03/23/2023 Screen Negative  Screen Negative Final    Cutoff for negative methadone is less than 300 ng/mL.     Oxycodone Urine 03/23/2023 Screen Negative  Screen Negative Final    Cutoff for a negative oxycodone is less than 100 ng/mL.     Creatinine Urine mg/dL 03/23/2023 60.2  mg/dL Final     Cortisol Saliva 03/24/2023 0.891  ug/dL Final    Comment: INTERPRETIVE INFORMATION: Cortisol,  Saliva    For collection at 2300 hr. the normal cortisol   concentration is less than 0.112 ug/dL.  Patients with   Cushings Syndrome have concentrations of 0.112 ug/dL or   greater.                     a.m. ([id number])      p.m. (noon-1800)  Males    2.5-7 years   0.034-0.645 ug/dL    0.053-0.607 ug/dL     8-11 years   0.084-0.839 ug/dL    less than 0.215 ug/dL    12-18 years   0.021-0.883 ug/dL    less than 0.259 ug/dL    19-30 years   0.112-0.743 ug/dL    less than 0.308 ug/dL    31-50 years   0.122-1.551 ug/dL    less than 0.359 ug/dL    51 and older  0.112-0.812 ug/dL    less than 0.228 ug/dL    Females    2.5-7 years   0.034-0.645 ug/dL    0.053-0.607 ug/dL     8-11 years   0.084-0.839 ug/dL    less than 0.215 ug/dL    12-18 years   0.021-0.883 ug/dL    less than 0.259 ug/dL    19-30 years   0.272-1.348 ug/dL    less than 0.359 ug/dL    31-50 years   0.094-1.515 ug/dL    less than 0.181 ug/dL    51 and older  0.149-0.739 ug/dL    0.022-0.254                            ug/dL  Performed by [employer],  [address] [place] [id number] [phone]  [email or web address], Rd Bautista MD, PHD, Lab. Director     No results found for any visits on 04/07/23.  No results found for this or any previous visit (from the past 24 hour(s)).

## 2023-04-13 ENCOUNTER — HOSPITAL ENCOUNTER (OUTPATIENT)
Dept: PHYSICAL THERAPY | Facility: REHABILITATION | Age: 38
Discharge: HOME | End: 2023-04-13
Attending: NURSE PRACTITIONER
Payer: COMMERCIAL

## 2023-04-13 DIAGNOSIS — M54.6 ACUTE BILATERAL THORACIC BACK PAIN: Primary | ICD-10-CM

## 2023-04-13 PROCEDURE — 97162 PT EVAL MOD COMPLEX 30 MIN: CPT | Mod: GP | Performed by: PHYSICAL THERAPIST

## 2023-04-13 PROCEDURE — 97140 MANUAL THERAPY 1/> REGIONS: CPT | Mod: GP | Performed by: PHYSICAL THERAPIST

## 2023-04-13 PROCEDURE — 97110 THERAPEUTIC EXERCISES: CPT | Mod: GP | Performed by: PHYSICAL THERAPIST

## 2023-04-20 ENCOUNTER — HOSPITAL ENCOUNTER (OUTPATIENT)
Dept: PHYSICAL THERAPY | Facility: REHABILITATION | Age: 38
Discharge: HOME | End: 2023-04-20
Attending: NURSE PRACTITIONER
Payer: COMMERCIAL

## 2023-04-20 PROCEDURE — 97110 THERAPEUTIC EXERCISES: CPT | Mod: GP | Performed by: PHYSICAL THERAPIST

## 2023-04-20 PROCEDURE — 97535 SELF CARE MNGMENT TRAINING: CPT | Mod: GP | Performed by: PHYSICAL THERAPIST

## 2023-05-03 ENCOUNTER — TELEPHONE (OUTPATIENT)
Dept: INTERNAL MEDICINE | Facility: CLINIC | Age: 38
End: 2023-05-03

## 2023-05-03 ENCOUNTER — VIRTUAL VISIT (OUTPATIENT)
Dept: INTERNAL MEDICINE | Facility: CLINIC | Age: 38
End: 2023-05-03
Payer: COMMERCIAL

## 2023-05-03 DIAGNOSIS — R20.9 BILATERAL COLD FEET: ICD-10-CM

## 2023-05-03 DIAGNOSIS — R41.840 DIFFICULTY CONCENTRATING: Primary | ICD-10-CM

## 2023-05-03 DIAGNOSIS — M79.672 PAIN IN BOTH FEET: ICD-10-CM

## 2023-05-03 DIAGNOSIS — M79.671 PAIN IN BOTH FEET: ICD-10-CM

## 2023-05-03 PROCEDURE — 99214 OFFICE O/P EST MOD 30 MIN: CPT | Mod: VID | Performed by: NURSE PRACTITIONER

## 2023-05-03 NOTE — TELEPHONE ENCOUNTER
Referral is for hot/cold feet. Thinking she needs to go to [facility]. They put in orders for bilateral venous duplex

## 2023-05-03 NOTE — TELEPHONE ENCOUNTER
Caller: Enedelia    Provider: New referral    Detailed reason for call: Please review referral and advise provider. She is open to scheduling at any location. Tuesdays and Thursday are best but will make anything work if needed.    Best phone number to contact: [phone]    Best time to contact: any    Ok to leave a detailed message: Yes

## 2023-05-03 NOTE — PROGRESS NOTES
"Enedelia is a 37 year old who is being evaluated via a billable video visit.      How would you like to obtain your AVS? MyChart  If the video visit is dropped, the invitation should be resent by: Text to cell phone: [phone]  Will anyone else be joining your video visit? No          Assessment & Plan   Problem List Items Addressed This Visit    None  Visit Diagnoses     Difficulty concentrating    -  Primary    Bilateral cold feet        Relevant Orders    Vascular Surgery Referral    US Lower Extremity Venous Duplex Bilateral    Pain in both feet        Relevant Orders    Vascular Surgery Referral    US Lower Extremity Venous Duplex Bilateral         Patient states right foot has now been getting hot and needs to have her right foot out of the blanke.tt She states the warmer the foot gets the more swelling occurs.  Patient states it has been ongoing for the past couple of weeks. She is being followed by [facility] and is currently in a CAM boot for her right ankle.  Previous to this she has had rather cold feet reports that they are pale to dusky in color.  Will refer to vascular surgery as well as obtaining a ultrasound of the lower extremities.  For patient difficult concentration we will recommend no medication changes at this time we will follow-up in 1 month's time, sooner if needed.             BMI:   Estimated body mass index is 32.58 kg/m  as calculated from the following:    Height as of 4/7/23: 1.651 m (5' 5\").    Weight as of 4/7/23: 88.8 kg (195 lb 12.8 oz).     Nadya Villalpando NP  [facility]    Rigo Mcdaniels is a 37 year old, presenting for the following health issues:  No chief complaint on file.        9/22/2022     8:25 AM   Additional Questions   Roomed by Joselin JAMES   Patient continues on Adderall as prescribed she is here today for follow-up.  She states that she has been under a great deal of stress and is unsure at this point if she is in need of a med adjustment she " wishes to stay on current dosing.    She also reports having some history of cold feet with some dusky coloring.        Review of Systems   Unremarkable other than listed above and below         Objective           Vitals:  No vitals were obtained today due to virtual visit.    Physical Exam   GENERAL: Healthy, alert and no distress  EYES: Eyes grossly normal to inspection.  No discharge or erythema, or obvious scleral/conjunctival abnormalities.  RESP: No audible wheeze, cough, or visible cyanosis.  No visible retractions or increased work of breathing.    SKIN: Visible skin clear. No significant rash, abnormal pigmentation or lesions.  NEURO: Cranial nerves grossly intact.  Mentation and speech appropriate for age.  PSYCH: Mentation appears normal, affect normal/bright, judgement and insight intact, normal speech and appearance well-groomed.    Lab on 03/23/2023   Component Date Value Ref Range Status     TSH 03/23/2023 1.69  0.30 - 4.20 uIU/mL Final     Cortisol 03/23/2023 8.1    ug/dL Final    6 months and older:  8 AM Cortisol Reference Range:  4-22 ug/dL   4 PM Cortisol Reference Range:  3-17 ug/dL    8 hrs post 1 mg dexamethasone given at midnight: < 5  g/dL     Hemoglobin A1C 03/23/2023 5.1  0.0 - 5.6 % Final    Normal <5.7%   Prediabetes 5.7-6.4%    Diabetes 6.5% or higher     Note: Adopted from ADA consensus guidelines.     Insulin Like Growth Factor 1 03/23/2023 216  79 - 276 ng/mL Final     Cortisol Saliva 03/23/2023 0.086  ug/dL Final    Comment: INTERPRETIVE INFORMATION: Cortisol, Saliva    For collection at 2300 hr. the normal cortisol   concentration is less than 0.112 ug/dL.  Patients with   Cushings Syndrome have concentrations of 0.112 ug/dL or   greater.                     a.m. ([id number])      p.m. (noon-1800)  Males    2.5-7 years   0.034-0.645 ug/dL    0.053-0.607 ug/dL     8-11 years   0.084-0.839 ug/dL    less than 0.215 ug/dL    12-18 years   0.021-0.883 ug/dL    less than 0.259 ug/dL     19-30 years   0.112-0.743 ug/dL    less than 0.308 ug/dL    31-50 years   0.122-1.551 ug/dL    less than 0.359 ug/dL    51 and older  0.112-0.812 ug/dL    less than 0.228 ug/dL    Females    2.5-7 years   0.034-0.645 ug/dL    0.053-0.607 ug/dL     8-11 years   0.084-0.839 ug/dL    less than 0.215 ug/dL    12-18 years   0.021-0.883 ug/dL    less than 0.259 ug/dL    19-30 years   0.272-1.348 ug/dL    less than 0.359 ug/dL    31-50 years   0.094-1.515 ug/dL    less than 0.181 ug/dL    51 and older  0.149-0.739 ug/dL    0.022-0.254                            ug/dL  Performed by [employer],  [address] [place] [address] [phone]  [email or web address], Rd Bautista MD, PHD, Lab. Director     Amphetamines Urine 03/23/2023 Screen Positive (A)  Screen Negative Final    Cutoff for a positive amphetamine is 500 ng/mL or greater.   This is an unconfirmed screening result to be used for medical purposes only.     Benzodiazepine Urine 03/23/2023 Screen Negative  Screen Negative Final    Cutoff for a negative benzodiazepine is less than 100 ng/mL.     Opiates Urine 03/23/2023 Screen Negative  Screen Negative Final    Cutoff for a negative opiate is less than 300 ng/mL.     PCP Urine 03/23/2023 Screen Negative  Screen Negative Final    Cutoff for a negative PCP is less than 25 ng/mL.     Cannabinoids Urine 03/23/2023 Screen Negative  Screen Negative Final    Cutoff for a negative cannabinoid is less than 50 ng/mL.     Barbituates Urine 03/23/2023 Screen Negative  Screen Negative Final    Cutoff for a negative barbiturate is less than 200 ng/mL.     Cocaine Urine 03/23/2023 Screen Negative  Screen Negative Final    Cutoff for a negative cocaine is less than 300 ng/mL.     Methadone Urine 03/23/2023 Screen Negative  Screen Negative Final    Cutoff for negative methadone is less than 300 ng/mL.     Oxycodone Urine 03/23/2023 Screen Negative  Screen Negative Final    Cutoff for a negative oxycodone is less than 100 ng/mL.      Creatinine Urine mg/dL 03/23/2023 60.2  mg/dL Final     Cortisol Saliva 03/24/2023 0.891  ug/dL Final    Comment: INTERPRETIVE INFORMATION: Cortisol, Saliva    For collection at 2300 hr. the normal cortisol   concentration is less than 0.112 ug/dL.  Patients with   Cushings Syndrome have concentrations of 0.112 ug/dL or   greater.                     a.m. ([id number])      p.m. (noon-1800)  Males    2.5-7 years   0.034-0.645 ug/dL    0.053-0.607 ug/dL     8-11 years   0.084-0.839 ug/dL    less than 0.215 ug/dL    12-18 years   0.021-0.883 ug/dL    less than 0.259 ug/dL    19-30 years   0.112-0.743 ug/dL    less than 0.308 ug/dL    31-50 years   0.122-1.551 ug/dL    less than 0.359 ug/dL    51 and older  0.112-0.812 ug/dL    less than 0.228 ug/dL    Females    2.5-7 years   0.034-0.645 ug/dL    0.053-0.607 ug/dL     8-11 years   0.084-0.839 ug/dL    less than 0.215 ug/dL    12-18 years   0.021-0.883 ug/dL    less than 0.259 ug/dL    19-30 years   0.272-1.348 ug/dL    less than 0.359 ug/dL    31-50 years   0.094-1.515 ug/dL    less than 0.181 ug/dL    51 and older  0.149-0.739 ug/dL    0.022-0.254                            ug/dL  Performed by [facility],  [address] [place] [id number] [phone]  [email or web address], [address] Bautista MD, PHD, Lab. Director     No results found for any visits on 05/03/23.  No results found for this or any previous visit (from the past 24 hour(s)).            Video-Visit Details    Type of service:  Video Visit   9:20am   9:26 am   Originating Location (pt. Location): Home    Distant Location (provider location):  On-site  Platform used for Video Visit: Ricardo

## 2023-05-04 ENCOUNTER — MYC MEDICAL ADVICE (OUTPATIENT)
Dept: INTERNAL MEDICINE | Facility: CLINIC | Age: 38
End: 2023-05-04
Payer: COMMERCIAL

## 2023-05-04 ENCOUNTER — TELEPHONE (OUTPATIENT)
Dept: INTERNAL MEDICINE | Facility: CLINIC | Age: 38
End: 2023-05-04
Payer: COMMERCIAL

## 2023-05-04 DIAGNOSIS — M79.671 PAIN IN BOTH FEET: Primary | ICD-10-CM

## 2023-05-04 DIAGNOSIS — M79.672 PAIN IN BOTH FEET: Primary | ICD-10-CM

## 2023-05-04 DIAGNOSIS — R20.9 BILATERAL COLD FEET: ICD-10-CM

## 2023-05-04 NOTE — TELEPHONE ENCOUNTER
Agree, pt can be seen by vascular medicine at [facility] or can wait to be until our new vasc med provider starts later this year. Would not need the venous duplex from vascular standpoint. Potentially consider doing cold stress challenge or just SAHIL's if she comes to [facility].

## 2023-05-04 NOTE — TELEPHONE ENCOUNTER
Received call from imaging regarding US Lower Extremity Venous Duplex Bilateral. Diagnosis code of cold feet would not work, wondering if PCP would like to cancel order since vascular is now involved.    Spoke with PCP, ok to cancel US Lower Extremity Venous Duplex Bilateral and wait for vascular, as noted in 5/3/23 telephone encounter by vascular.     Attempted to call imaging back to relay message, but phone number was wrong.    LM for patient to call back. Need to cancel imaging appt for later today, and have her call vascular back to schedule.

## 2023-05-04 NOTE — TELEPHONE ENCOUNTER
Spoke with patient via 5/4/23 telephone encounter      Concerned that vascular is scheduling out until September and wants to be seen sooner if possible. Requesting referral sent to [facility] to see if she can get a sooner appointment.    Requested mychart messages instead of phone calls. Would like Mychart message back regarding referral.

## 2023-05-04 NOTE — TELEPHONE ENCOUNTER
Patient calling back.    Agreeable to cancel imaging appt since not needed.    Concerned that vascular is scheduling out until September and wants to be seen sooner if possible. Requesting referral sent to [facility] to see if she can get a sooner appointment.    Requested [employer] messages instead of phone calls.

## 2023-05-04 NOTE — TELEPHONE ENCOUNTER
Left message with patient that she can schedule with our clinic in the fall [facility].  If she would like to be seen sooner please call the [facility], number left

## 2023-05-11 DIAGNOSIS — E03.9 HYPOTHYROIDISM, UNSPECIFIED TYPE: ICD-10-CM

## 2023-05-11 NOTE — TELEPHONE ENCOUNTER
"Routing refill request to provider for review/approval because:  Pt reports taking differently    Last Written Prescription Date:  5/5/22  Last Fill Quantity: 90,  # refills: 3   Last office visit provider:   5/3/23    Requested Prescriptions   Pending Prescriptions Disp Refills     levothyroxine (SYNTHROID/LEVOTHROID) 88 MCG tablet 90 tablet 3     Sig: Take 1 tablet (88 mcg) by mouth daily       Thyroid Protocol Passed - 5/11/2023  8:25 AM        Passed - Patient is 12 years or older        Passed - Recent (12 mo) or future (30 days) visit within the authorizing provider's specialty     Patient has had an office visit with the authorizing provider or a provider within the authorizing providers department within the previous 12 mos or has a future within next 30 days. See \"Patient Info\" tab in inbasket, or \"Choose Columns\" in Meds & Orders section of the refill encounter.              Passed - Medication is active on med list        Passed - Normal TSH on file in past 12 months     Recent Labs   Lab Test 03/23/23  1622   TSH 1.69              Passed - No active pregnancy on record     If patient is pregnant or has had a positive pregnancy test, please check TSH.          Passed - No positive pregnancy test in past 12 months     If patient is pregnant or has had a positive pregnancy test, please check TSH.               Shasta Shaw RN 05/11/23 6:57 PM  "

## 2023-05-12 DIAGNOSIS — G93.32 CHRONIC FATIGUE SYNDROME: ICD-10-CM

## 2023-05-12 DIAGNOSIS — R41.840 DIFFICULTY CONCENTRATING: ICD-10-CM

## 2023-05-12 RX ORDER — LEVOTHYROXINE SODIUM 88 UG/1
88 TABLET ORAL
Qty: 90 TABLET | Refills: 3 | Status: SHIPPED | OUTPATIENT
Start: 2023-05-12 | End: 2024-04-30

## 2023-05-12 NOTE — TELEPHONE ENCOUNTER
Controlled Substance Refill Request for     amphetamine-dextroamphetamine (ADDERALL XR) 10 MG 24 hr capsule    Last refill: 4/7/2023    Last clinic visit: 5/3/2023 VV    Clinic visit frequency required:   Next appt: No future appt

## 2023-05-15 RX ORDER — DEXTROAMPHETAMINE SACCHARATE, AMPHETAMINE ASPARTATE MONOHYDRATE, DEXTROAMPHETAMINE SULFATE AND AMPHETAMINE SULFATE 2.5; 2.5; 2.5; 2.5 MG/1; MG/1; MG/1; MG/1
10 CAPSULE, EXTENDED RELEASE ORAL DAILY
Qty: 30 CAPSULE | Refills: 0 | Status: SHIPPED | OUTPATIENT
Start: 2023-05-15 | End: 2023-06-18

## 2023-06-08 ENCOUNTER — VIRTUAL VISIT (OUTPATIENT)
Dept: INTERNAL MEDICINE | Facility: CLINIC | Age: 38
End: 2023-06-08
Payer: COMMERCIAL

## 2023-06-08 DIAGNOSIS — R42 DIZZINESS: ICD-10-CM

## 2023-06-08 DIAGNOSIS — G93.32 CHRONIC FATIGUE SYNDROME: Primary | ICD-10-CM

## 2023-06-08 DIAGNOSIS — F32.0 MILD MAJOR DEPRESSION (H): ICD-10-CM

## 2023-06-08 DIAGNOSIS — R41.840 DIFFICULTY CONCENTRATING: ICD-10-CM

## 2023-06-08 DIAGNOSIS — R06.2 WHEEZE: ICD-10-CM

## 2023-06-08 DIAGNOSIS — F41.9 ANXIETY: ICD-10-CM

## 2023-06-08 DIAGNOSIS — M62.89 MUSCLE FATIGUE: ICD-10-CM

## 2023-06-08 PROCEDURE — 99214 OFFICE O/P EST MOD 30 MIN: CPT | Mod: VID | Performed by: NURSE PRACTITIONER

## 2023-06-08 RX ORDER — DEXTROAMPHETAMINE SACCHARATE, AMPHETAMINE ASPARTATE, DEXTROAMPHETAMINE SULFATE AND AMPHETAMINE SULFATE 2.5; 2.5; 2.5; 2.5 MG/1; MG/1; MG/1; MG/1
10 TABLET ORAL DAILY
Qty: 30 TABLET | Refills: 0 | Status: SHIPPED | OUTPATIENT
Start: 2023-08-09 | End: 2023-09-12

## 2023-06-08 RX ORDER — PREDNISONE 20 MG/1
20 TABLET ORAL DAILY
Qty: 5 TABLET | Refills: 0 | Status: SHIPPED | OUTPATIENT
Start: 2023-06-08 | End: 2023-10-10

## 2023-06-08 RX ORDER — DEXTROAMPHETAMINE SACCHARATE, AMPHETAMINE ASPARTATE, DEXTROAMPHETAMINE SULFATE AND AMPHETAMINE SULFATE 2.5; 2.5; 2.5; 2.5 MG/1; MG/1; MG/1; MG/1
10 TABLET ORAL DAILY
Qty: 30 TABLET | Refills: 0 | Status: SHIPPED | OUTPATIENT
Start: 2023-07-09 | End: 2023-08-08

## 2023-06-08 RX ORDER — DEXTROAMPHETAMINE SACCHARATE, AMPHETAMINE ASPARTATE, DEXTROAMPHETAMINE SULFATE AND AMPHETAMINE SULFATE 2.5; 2.5; 2.5; 2.5 MG/1; MG/1; MG/1; MG/1
10 TABLET ORAL DAILY
Qty: 30 TABLET | Refills: 0 | Status: SHIPPED | OUTPATIENT
Start: 2023-06-08 | End: 2023-07-08

## 2023-06-08 RX ORDER — ALBUTEROL SULFATE 90 UG/1
2 AEROSOL, METERED RESPIRATORY (INHALATION) EVERY 6 HOURS PRN
Qty: 18 G | Refills: 0 | Status: SHIPPED | OUTPATIENT
Start: 2023-06-08 | End: 2024-04-17

## 2023-06-08 ASSESSMENT — ANXIETY QUESTIONNAIRES
8. IF YOU CHECKED OFF ANY PROBLEMS, HOW DIFFICULT HAVE THESE MADE IT FOR YOU TO DO YOUR WORK, TAKE CARE OF THINGS AT HOME, OR GET ALONG WITH OTHER PEOPLE?: SOMEWHAT DIFFICULT
4. TROUBLE RELAXING: NEARLY EVERY DAY
3. WORRYING TOO MUCH ABOUT DIFFERENT THINGS: SEVERAL DAYS
IF YOU CHECKED OFF ANY PROBLEMS ON THIS QUESTIONNAIRE, HOW DIFFICULT HAVE THESE PROBLEMS MADE IT FOR YOU TO DO YOUR WORK, TAKE CARE OF THINGS AT HOME, OR GET ALONG WITH OTHER PEOPLE: SOMEWHAT DIFFICULT
1. FEELING NERVOUS, ANXIOUS, OR ON EDGE: SEVERAL DAYS
7. FEELING AFRAID AS IF SOMETHING AWFUL MIGHT HAPPEN: NOT AT ALL
5. BEING SO RESTLESS THAT IT IS HARD TO SIT STILL: NOT AT ALL
6. BECOMING EASILY ANNOYED OR IRRITABLE: NEARLY EVERY DAY
2. NOT BEING ABLE TO STOP OR CONTROL WORRYING: SEVERAL DAYS
GAD7 TOTAL SCORE: 9
7. FEELING AFRAID AS IF SOMETHING AWFUL MIGHT HAPPEN: NOT AT ALL
GAD7 TOTAL SCORE: 9

## 2023-06-08 ASSESSMENT — PATIENT HEALTH QUESTIONNAIRE - PHQ9
10. IF YOU CHECKED OFF ANY PROBLEMS, HOW DIFFICULT HAVE THESE PROBLEMS MADE IT FOR YOU TO DO YOUR WORK, TAKE CARE OF THINGS AT HOME, OR GET ALONG WITH OTHER PEOPLE: SOMEWHAT DIFFICULT
SUM OF ALL RESPONSES TO PHQ QUESTIONS 1-9: 6
SUM OF ALL RESPONSES TO PHQ QUESTIONS 1-9: 6

## 2023-06-08 NOTE — PROGRESS NOTES
"Enedelia is a 37 year old who is being evaluated via a billable video visit.      How would you like to obtain your AVS? MyChart  If the video visit is dropped, the invitation should be resent by: Text to cell phone: [phone]  Will anyone else be joining your video visit? No          Assessment & Plan   Problem List Items Addressed This Visit        Nervous and Auditory    Chronic fatigue syndrome - Primary   Other Visit Diagnoses     Difficulty concentrating        Relevant Medications    amphetamine-dextroamphetamine (ADDERALL) 10 MG tablet    amphetamine-dextroamphetamine (ADDERALL) 10 MG tablet (Start on 7/9/2023)    amphetamine-dextroamphetamine (ADDERALL) 10 MG tablet (Start on 8/9/2023)    Mild major depression (H)        Anxiety        Dizziness        Relevant Medications    predniSONE (DELTASONE) 20 MG tablet    Muscle fatigue        Relevant Medications    predniSONE (DELTASONE) 20 MG tablet    Wheeze        Relevant Medications    predniSONE (DELTASONE) 20 MG tablet    albuterol (PROAIR HFA/PROVENTIL HFA/VENTOLIN HFA) 108 (90 Base) MCG/ACT inhaler       currently taking adderall 10mg xr daily. Will increase to 10mg xr in the a.m. amd 10 IR in the afternoon prn     Patient reports increase in wheezing, dizziness and fatigue.  Start prednisone and proair.     Patient advised if symptoms persist, worsen or new symptoms arise they are to seek medical care.  All patients questions addressed. Patient verbalized understanding and agreement with plan.          BMI:   Estimated body mass index is 32.58 kg/m  as calculated from the following:    Height as of 4/7/23: 1.651 m (5' 5\").    Weight as of 4/7/23: 88.8 kg (195 lb 12.8 oz).           Nadya Villalpando NP  [facility]    Rigo Mcdaniels is a 37 year old, presenting for the following health issues:  No chief complaint on file.        9/22/2022     8:25 AM   Additional Questions   Roomed by Joselin JAMES             Review of Systems "   Unremarkable other than listed above and below         Objective           Vitals:  No vitals were obtained today due to virtual visit.    Physical Exam   GENERAL: Healthy, alert and no distress  EYES: Eyes grossly normal to inspection.  No discharge or erythema, or obvious scleral/conjunctival abnormalities.  RESP: No audible wheeze, cough, or visible cyanosis.  No visible retractions or increased work of breathing.    SKIN: Visible skin clear. No significant rash, abnormal pigmentation or lesions.  NEURO: Cranial nerves grossly intact.  Mentation and speech appropriate for age.  PSYCH: Mentation appears normal, affect normal/bright, judgement and insight intact, normal speech and appearance well-groomed.                Video-Visit Details    Type of service:  Video Visit    Start: 2:00  End: 2:12    Originating Location (pt. Location): Home  Distant Location (provider location):  On-site  Platform used for Video Visit: Ricardo

## 2023-06-16 DIAGNOSIS — G93.32 CHRONIC FATIGUE SYNDROME: ICD-10-CM

## 2023-06-16 DIAGNOSIS — R41.840 DIFFICULTY CONCENTRATING: ICD-10-CM

## 2023-06-16 NOTE — TELEPHONE ENCOUNTER
Pharmacy calling as they rcvd Adderall IR prescription on 6/8/2023 but not the Adderall XR  Patient is now at pharmacy to  & no Rx on file.   PCP is gone for the day so sending high priority message

## 2023-06-18 RX ORDER — DEXTROAMPHETAMINE SACCHARATE, AMPHETAMINE ASPARTATE MONOHYDRATE, DEXTROAMPHETAMINE SULFATE AND AMPHETAMINE SULFATE 2.5; 2.5; 2.5; 2.5 MG/1; MG/1; MG/1; MG/1
10 CAPSULE, EXTENDED RELEASE ORAL DAILY
Qty: 30 CAPSULE | Refills: 0 | Status: SHIPPED | OUTPATIENT
Start: 2023-06-18 | End: 2023-07-09

## 2023-07-07 ENCOUNTER — MYC MEDICAL ADVICE (OUTPATIENT)
Dept: INTERNAL MEDICINE | Facility: CLINIC | Age: 38
End: 2023-07-07
Payer: COMMERCIAL

## 2023-07-07 DIAGNOSIS — G93.32 CHRONIC FATIGUE SYNDROME: ICD-10-CM

## 2023-07-07 DIAGNOSIS — R41.840 DIFFICULTY CONCENTRATING: ICD-10-CM

## 2023-07-09 RX ORDER — DEXTROAMPHETAMINE SACCHARATE, AMPHETAMINE ASPARTATE MONOHYDRATE, DEXTROAMPHETAMINE SULFATE AND AMPHETAMINE SULFATE 2.5; 2.5; 2.5; 2.5 MG/1; MG/1; MG/1; MG/1
10 CAPSULE, EXTENDED RELEASE ORAL DAILY
Qty: 30 CAPSULE | Refills: 0 | Status: SHIPPED | OUTPATIENT
Start: 2023-07-09 | End: 2023-08-17

## 2023-07-10 ENCOUNTER — E-VISIT (OUTPATIENT)
Dept: FAMILY MEDICINE | Facility: CLINIC | Age: 38
End: 2023-07-10
Payer: COMMERCIAL

## 2023-07-10 DIAGNOSIS — M62.81 MUSCLE WEAKNESS (GENERALIZED): Primary | ICD-10-CM

## 2023-07-10 PROCEDURE — 99207 PR NON-BILLABLE SERV PER CHARTING: CPT

## 2023-07-13 ENCOUNTER — VIRTUAL VISIT (OUTPATIENT)
Dept: INTERNAL MEDICINE | Facility: CLINIC | Age: 38
End: 2023-07-13
Payer: COMMERCIAL

## 2023-07-13 DIAGNOSIS — M62.81 MUSCLE WEAKNESS (GENERALIZED): Primary | ICD-10-CM

## 2023-07-13 DIAGNOSIS — R10.32 LLQ PAIN: ICD-10-CM

## 2023-07-13 DIAGNOSIS — R10.84 DIFFUSE ABDOMINAL PAIN: ICD-10-CM

## 2023-07-13 PROCEDURE — 99213 OFFICE O/P EST LOW 20 MIN: CPT | Mod: 93 | Performed by: NURSE PRACTITIONER

## 2023-07-13 ASSESSMENT — PATIENT HEALTH QUESTIONNAIRE - PHQ9
SUM OF ALL RESPONSES TO PHQ QUESTIONS 1-9: 4
10. IF YOU CHECKED OFF ANY PROBLEMS, HOW DIFFICULT HAVE THESE PROBLEMS MADE IT FOR YOU TO DO YOUR WORK, TAKE CARE OF THINGS AT HOME, OR GET ALONG WITH OTHER PEOPLE: NOT DIFFICULT AT ALL
SUM OF ALL RESPONSES TO PHQ QUESTIONS 1-9: 4

## 2023-07-13 NOTE — PROGRESS NOTES
"Enedelia is a 37 year old who is being evaluated via a billable telephone visit.      What phone number would you like to be contacted at? Home   How would you like to obtain your AVS? Giuseppet    Distant Location (provider location):  On-site    Assessment & Plan   Problem List Items Addressed This Visit    None  Visit Diagnoses     Muscle weakness (generalized)    -  Primary       patient states she is struggling with carrying laundry up a single flight of stairs.  Patient states she is concerns about worsening symptoms. Patient indicates 2 week history of headache and tylenol does dull the pain but pain is always there, pain behind left ear and left eye is irritated and burns, watery eye and swelling. Patient tried topical allergy med without relief.    Patient reports hand shaking and \"locking up\" when typing at work, Patient states Sunday she was unable to pull weeds on Sunday and the fatigue worsened and felt she could not lift up her arms.     Patient reports nausea, reports she is hungry and is eating.            BMI:   Estimated body mass index is 32.58 kg/m  as calculated from the following:    Height as of 4/7/23: 1.651 m (5' 5\").    Weight as of 4/7/23: 88.8 kg (195 lb 12.8 oz).           Nadya Villalpando NP  [facility]    Subjective   Enedelia is a 37 year old, presenting for the following health issues:  No chief complaint on file.        9/22/2022     8:25 AM   Additional Questions   Roomed by Joselin JAMES       Muscle weakness       Review of Systems   Unremarkable other than listed above and below         Objective           Vitals:  No vitals were obtained today due to virtual visit.    Physical Exam   healthy, alert and no distress  PSYCH: Alert and oriented times 3; coherent speech, normal   rate and volume, able to articulate logical thoughts, able   to abstract reason, no tangential thoughts, no hallucinations   or delusions  Her affect is normal  RESP: No cough, no audible " wheezing, able to talk in full sentences  Remainder of exam unable to be completed due to telephone visits                Phone call duration: 12 minutes

## 2023-07-17 ENCOUNTER — TELEPHONE (OUTPATIENT)
Dept: INTERNAL MEDICINE | Facility: CLINIC | Age: 38
End: 2023-07-17

## 2023-07-17 ENCOUNTER — LAB (OUTPATIENT)
Dept: LAB | Facility: CLINIC | Age: 38
End: 2023-07-17
Payer: COMMERCIAL

## 2023-07-17 DIAGNOSIS — M62.81 MUSCLE WEAKNESS (GENERALIZED): ICD-10-CM

## 2023-07-17 LAB
ALBUMIN SERPL BCG-MCNC: 4.6 G/DL (ref 3.5–5.2)
ALP SERPL-CCNC: 70 U/L (ref 35–104)
ALT SERPL W P-5'-P-CCNC: 24 U/L (ref 0–50)
ANION GAP SERPL CALCULATED.3IONS-SCNC: 11 MMOL/L (ref 7–15)
AST SERPL W P-5'-P-CCNC: 26 U/L (ref 0–45)
BASOPHILS # BLD AUTO: 0 10E3/UL (ref 0–0.2)
BASOPHILS NFR BLD AUTO: 1 %
BILIRUB SERPL-MCNC: 0.3 MG/DL
BUN SERPL-MCNC: 10 MG/DL (ref 6–20)
CALCIUM SERPL-MCNC: 9.1 MG/DL (ref 8.6–10)
CHLORIDE SERPL-SCNC: 106 MMOL/L (ref 98–107)
CREAT SERPL-MCNC: 0.79 MG/DL (ref 0.51–0.95)
CRP SERPL-MCNC: <3 MG/L
DEPRECATED HCO3 PLAS-SCNC: 24 MMOL/L (ref 22–29)
EOSINOPHIL # BLD AUTO: 0 10E3/UL (ref 0–0.7)
EOSINOPHIL NFR BLD AUTO: 1 %
ERYTHROCYTE [DISTWIDTH] IN BLOOD BY AUTOMATED COUNT: 12.2 % (ref 10–15)
ERYTHROCYTE [SEDIMENTATION RATE] IN BLOOD BY WESTERGREN METHOD: 12 MM/HR (ref 0–20)
FERRITIN SERPL-MCNC: 133 NG/ML (ref 6–175)
GFR SERPL CREATININE-BSD FRML MDRD: >90 ML/MIN/1.73M2
GLUCOSE SERPL-MCNC: 86 MG/DL (ref 70–99)
HCT VFR BLD AUTO: 40.5 % (ref 35–47)
HGB BLD-MCNC: 13.6 G/DL (ref 11.7–15.7)
IMM GRANULOCYTES # BLD: 0 10E3/UL
IMM GRANULOCYTES NFR BLD: 0 %
IRON BINDING CAPACITY (ROCHE): 261 UG/DL (ref 240–430)
IRON SATN MFR SERPL: 35 % (ref 15–46)
IRON SERPL-MCNC: 91 UG/DL (ref 37–145)
LYMPHOCYTES # BLD AUTO: 1.4 10E3/UL (ref 0.8–5.3)
LYMPHOCYTES NFR BLD AUTO: 41 %
MAGNESIUM SERPL-MCNC: 2 MG/DL (ref 1.7–2.3)
MCH RBC QN AUTO: 30.4 PG (ref 26.5–33)
MCHC RBC AUTO-ENTMCNC: 33.6 G/DL (ref 31.5–36.5)
MCV RBC AUTO: 90 FL (ref 78–100)
MONOCYTES # BLD AUTO: 0.3 10E3/UL (ref 0–1.3)
MONOCYTES NFR BLD AUTO: 8 %
NEUTROPHILS # BLD AUTO: 1.7 10E3/UL (ref 1.6–8.3)
NEUTROPHILS NFR BLD AUTO: 49 %
PLATELET # BLD AUTO: 140 10E3/UL (ref 150–450)
POTASSIUM SERPL-SCNC: 3.8 MMOL/L (ref 3.4–5.3)
PROT SERPL-MCNC: 8.2 G/DL (ref 6.4–8.3)
RBC # BLD AUTO: 4.48 10E6/UL (ref 3.8–5.2)
SODIUM SERPL-SCNC: 141 MMOL/L (ref 136–145)
URATE SERPL-MCNC: 3.8 MG/DL (ref 2.4–5.7)
VIT B12 SERPL-MCNC: 512 PG/ML (ref 232–1245)
WBC # BLD AUTO: 3.4 10E3/UL (ref 4–11)

## 2023-07-17 PROCEDURE — 83550 IRON BINDING TEST: CPT

## 2023-07-17 PROCEDURE — 86618 LYME DISEASE ANTIBODY: CPT

## 2023-07-17 PROCEDURE — 83540 ASSAY OF IRON: CPT

## 2023-07-17 PROCEDURE — 85652 RBC SED RATE AUTOMATED: CPT

## 2023-07-17 PROCEDURE — 36415 COLL VENOUS BLD VENIPUNCTURE: CPT

## 2023-07-17 PROCEDURE — 83735 ASSAY OF MAGNESIUM: CPT

## 2023-07-17 PROCEDURE — 86666 EHRLICHIA ANTIBODY: CPT | Mod: 90

## 2023-07-17 PROCEDURE — 82728 ASSAY OF FERRITIN: CPT

## 2023-07-17 PROCEDURE — 80053 COMPREHEN METABOLIC PANEL: CPT

## 2023-07-17 PROCEDURE — 86140 C-REACTIVE PROTEIN: CPT

## 2023-07-17 PROCEDURE — 85025 COMPLETE CBC W/AUTO DIFF WBC: CPT

## 2023-07-17 PROCEDURE — 82607 VITAMIN B-12: CPT

## 2023-07-17 PROCEDURE — 99000 SPECIMEN HANDLING OFFICE-LAB: CPT

## 2023-07-17 PROCEDURE — 84550 ASSAY OF BLOOD/URIC ACID: CPT

## 2023-07-17 NOTE — TELEPHONE ENCOUNTER
[facility] calling to get more information regarding MRI abdomen ordered on 7/13/2023  Pt is scheduled for Sunday 7/23 for MRI abdomen but diagnosis is muscle weakness so looking for more information as to why MRI ordered or what provider is looking for.  Please advise     MRI would like call back at [phone] with update

## 2023-07-18 LAB — B BURGDOR IGG+IGM SER QL: 0.2

## 2023-07-19 ENCOUNTER — TELEPHONE (OUTPATIENT)
Dept: INTERNAL MEDICINE | Facility: CLINIC | Age: 38
End: 2023-07-19
Payer: COMMERCIAL

## 2023-07-19 LAB
A PHAGOCYTOPH IGG TITR SER IF: NORMAL {TITER}
A PHAGOCYTOPH IGM TITR SER IF: NORMAL {TITER}

## 2023-07-19 NOTE — TELEPHONE ENCOUNTER
Michelle calling from Radiology to request clarification on MRI Abdomen that is scheduled for Sunday 7/23/23.  Stating that the diagnosis associated is mildly unclear as to what the MRI is supposed to be looking for.  Radiologist is thinking Nadya is seeking a liver scan, but wants clarification from Nadya before proceeding.  Please clarify what exactly is to be scanned with the MRI and call MRI techs with that information.  Per Michelle, anyone that answers the following number will be able to take the clarification information.    [phone]

## 2023-07-20 ENCOUNTER — TRANSFERRED RECORDS (OUTPATIENT)
Dept: HEALTH INFORMATION MANAGEMENT | Facility: CLINIC | Age: 38
End: 2023-07-20
Payer: COMMERCIAL

## 2023-07-20 DIAGNOSIS — D72.819 LEUKOPENIA, UNSPECIFIED TYPE: Primary | ICD-10-CM

## 2023-07-20 DIAGNOSIS — D69.6 THROMBOCYTOPENIA (H): ICD-10-CM

## 2023-07-23 ENCOUNTER — ANCILLARY ORDERS (OUTPATIENT)
Dept: INTERNAL MEDICINE | Facility: CLINIC | Age: 38
End: 2023-07-23

## 2023-07-23 ENCOUNTER — HOSPITAL ENCOUNTER (OUTPATIENT)
Dept: MRI IMAGING | Facility: CLINIC | Age: 38
Discharge: HOME | End: 2023-07-23
Attending: NURSE PRACTITIONER | Admitting: NURSE PRACTITIONER
Payer: COMMERCIAL

## 2023-07-23 DIAGNOSIS — R10.84 DIFFUSE ABDOMINAL PAIN: ICD-10-CM

## 2023-07-23 DIAGNOSIS — R10.32 LLQ PAIN: ICD-10-CM

## 2023-07-23 PROCEDURE — 74181 MRI ABDOMEN W/O CONTRAST: CPT

## 2023-07-24 ENCOUNTER — LAB (OUTPATIENT)
Dept: LAB | Facility: CLINIC | Age: 38
End: 2023-07-24
Payer: COMMERCIAL

## 2023-07-24 DIAGNOSIS — D69.6 THROMBOCYTOPENIA (H): ICD-10-CM

## 2023-07-24 DIAGNOSIS — D72.819 LEUKOPENIA, UNSPECIFIED TYPE: ICD-10-CM

## 2023-07-24 LAB
BASOPHILS # BLD AUTO: 0 10E3/UL (ref 0–0.2)
BASOPHILS NFR BLD AUTO: 1 %
EOSINOPHIL # BLD AUTO: 0 10E3/UL (ref 0–0.7)
EOSINOPHIL NFR BLD AUTO: 1 %
ERYTHROCYTE [DISTWIDTH] IN BLOOD BY AUTOMATED COUNT: 11.6 % (ref 10–15)
HCT VFR BLD AUTO: 35.9 % (ref 35–47)
HGB BLD-MCNC: 12.2 G/DL (ref 11.7–15.7)
IMM GRANULOCYTES # BLD: 0 10E3/UL
IMM GRANULOCYTES NFR BLD: 0 %
LYMPHOCYTES # BLD AUTO: 1.4 10E3/UL (ref 0.8–5.3)
LYMPHOCYTES NFR BLD AUTO: 49 %
MCH RBC QN AUTO: 30 PG (ref 26.5–33)
MCHC RBC AUTO-ENTMCNC: 34 G/DL (ref 31.5–36.5)
MCV RBC AUTO: 88 FL (ref 78–100)
MONOCYTES # BLD AUTO: 0.3 10E3/UL (ref 0–1.3)
MONOCYTES NFR BLD AUTO: 11 %
NEUTROPHILS # BLD AUTO: 1.1 10E3/UL (ref 1.6–8.3)
NEUTROPHILS NFR BLD AUTO: 39 %
PLATELET # BLD AUTO: 157 10E3/UL (ref 150–450)
RBC # BLD AUTO: 4.07 10E6/UL (ref 3.8–5.2)
RETICS # AUTO: 0.03 10E6/UL (ref 0.01–0.11)
RETICS/RBC NFR AUTO: 0.8 % (ref 0.8–2.7)
WBC # BLD AUTO: 2.8 10E3/UL (ref 4–11)

## 2023-07-24 PROCEDURE — 36415 COLL VENOUS BLD VENIPUNCTURE: CPT

## 2023-07-24 PROCEDURE — 85025 COMPLETE CBC W/AUTO DIFF WBC: CPT

## 2023-07-24 PROCEDURE — 85045 AUTOMATED RETICULOCYTE COUNT: CPT

## 2023-07-24 PROCEDURE — 85060 BLOOD SMEAR INTERPRETATION: CPT | Performed by: PATHOLOGY

## 2023-07-25 ENCOUNTER — TELEPHONE (OUTPATIENT)
Dept: INTERNAL MEDICINE | Facility: CLINIC | Age: 38
End: 2023-07-25
Payer: COMMERCIAL

## 2023-07-25 LAB
PATH REPORT.COMMENTS IMP SPEC: NORMAL
PATH REPORT.COMMENTS IMP SPEC: NORMAL
PATH REPORT.FINAL DX SPEC: NORMAL
PATH REPORT.RELEVANT HX SPEC: NORMAL

## 2023-07-25 NOTE — TELEPHONE ENCOUNTER
07/25/23  General Call      Reason for Call:  Pt received call from  Clinic    What are your questions or concerns:  Pt received 2 missed calls from  Clinic with no message left. Unable to identify who called or why. Pt wants it known that it is ok to leave messages on her phone [phone]    Could we send this information to you in [employer] or would you prefer to receive a phone call?:   Patient would prefer a phone call   Okay to leave a detailed message?: Yes at Cell number on file:    Telephone Information:   Mobile [phone]

## 2023-07-27 ENCOUNTER — APPOINTMENT (OUTPATIENT)
Dept: LAB | Facility: CLINIC | Age: 38
End: 2023-07-27
Payer: COMMERCIAL

## 2023-07-29 ENCOUNTER — TRANSFERRED RECORDS (OUTPATIENT)
Dept: HEALTH INFORMATION MANAGEMENT | Facility: CLINIC | Age: 38
End: 2023-07-29
Payer: COMMERCIAL

## 2023-08-17 ENCOUNTER — MYC REFILL (OUTPATIENT)
Dept: INTERNAL MEDICINE | Facility: CLINIC | Age: 38
End: 2023-08-17
Payer: COMMERCIAL

## 2023-08-17 DIAGNOSIS — R41.840 DIFFICULTY CONCENTRATING: ICD-10-CM

## 2023-08-17 DIAGNOSIS — G93.32 CHRONIC FATIGUE SYNDROME: ICD-10-CM

## 2023-08-17 RX ORDER — DEXTROAMPHETAMINE SACCHARATE, AMPHETAMINE ASPARTATE MONOHYDRATE, DEXTROAMPHETAMINE SULFATE AND AMPHETAMINE SULFATE 2.5; 2.5; 2.5; 2.5 MG/1; MG/1; MG/1; MG/1
10 CAPSULE, EXTENDED RELEASE ORAL DAILY
Qty: 30 CAPSULE | Refills: 0 | Status: SHIPPED | OUTPATIENT
Start: 2023-08-17 | End: 2023-08-24

## 2023-08-23 ENCOUNTER — PATIENT OUTREACH (OUTPATIENT)
Dept: CARE COORDINATION | Facility: CLINIC | Age: 38
End: 2023-08-23
Payer: COMMERCIAL

## 2023-08-23 NOTE — PROGRESS NOTES
"Enedelia is a 37 year old who is being evaluated via a billable video visit.      How would you like to obtain your AVS? MyChart  If the video visit is dropped, the invitation should be resent by: Text to cell phone: [phone]  Will anyone else be joining your video visit? No          Assessment & Plan   Problem List Items Addressed This Visit          Behavioral    Major depression, single episode     Other Visit Diagnoses       Difficulty concentrating    -  Primary    Relevant Medications    amphetamine-dextroamphetamine (ADDERALL XR) 15 MG 24 hr capsule    amphetamine-dextroamphetamine (ADDERALL XR) 15 MG 24 hr capsule (Start on 9/24/2023)    amphetamine-dextroamphetamine (ADDERALL XR) 15 MG 24 hr capsule (Start on 10/25/2023)    Leukopenia, unspecified type        Relevant Orders    CBC with platelets and differential    Metallic taste             IRBBB dx    Patient states she is taking the immediate release around 2pm and is able to get through the rest of the day, though continues to \"struggle\" with the morning. Sleep has been difficult the last couple of weeks after being on jury duty and working.    Discussed metallic taste could be side effect of medication.     Recommend b12 every other day, vitamin d3 2000 IUS daily.    Loss of sensation hands, emg ordered and will be completed in September.           BMI:   Estimated body mass index is 32.58 kg/m  as calculated from the following:    Height as of 4/7/23: 1.651 m (5' 5\").    Weight as of 4/7/23: 88.8 kg (195 lb 12.8 oz).       MEDICATIONS:   Orders Placed This Encounter   Medications    amphetamine-dextroamphetamine (ADDERALL XR) 15 MG 24 hr capsule     Sig: Take 1 capsule (15 mg) by mouth daily for 30 days     Dispense:  30 capsule     Refill:  0    amphetamine-dextroamphetamine (ADDERALL XR) 15 MG 24 hr capsule     Sig: Take 1 capsule (15 mg) by mouth daily for 30 days     Dispense:  30 capsule     Refill:  0    amphetamine-dextroamphetamine (ADDERALL " XR) 15 MG 24 hr capsule     Sig: Take 1 capsule (15 mg) by mouth daily for 30 days     Dispense:  30 capsule     Refill:  0     Medications Discontinued During This Encounter   Medication Reason    amphetamine-dextroamphetamine (ADDERALL XR) 10 MG 24 hr capsule           - Continue other medications without change    Nadya Villalpando NP  [facility] RANDY Mcdaniels is a 37 year old, presenting for the following health issues:  No chief complaint on file.      HPI   Follow up       Review of Systems   Unremarkable other than listed above and below         Objective           Vitals:  No vitals were obtained today due to virtual visit.    Physical Exam   GENERAL: Healthy, alert and no distress  EYES: Eyes grossly normal to inspection.  No discharge or erythema, or obvious scleral/conjunctival abnormalities.  RESP: No audible wheeze, cough, or visible cyanosis.  No visible retractions or increased work of breathing.    SKIN: Visible skin clear. No significant rash, abnormal pigmentation or lesions.  NEURO: Cranial nerves grossly intact.  Mentation and speech appropriate for age.  PSYCH: Mentation appears normal, affect normal/bright, judgement and insight intact, normal speech and appearance well-groomed.                Video-Visit Details    Type of service:  Video Visit     Originating Location (pt. Location): Home    Distant Location (provider location):  On-site  Platform used for Video Visit: Mediasmart  Total Time: 20 minutes video visit     Current Outpatient Medications:     amphetamine-dextroamphetamine (ADDERALL XR) 15 MG 24 hr capsule, Take 1 capsule (15 mg) by mouth daily for 30 days, Disp: 30 capsule, Rfl: 0    [START ON 9/24/2023] amphetamine-dextroamphetamine (ADDERALL XR) 15 MG 24 hr capsule, Take 1 capsule (15 mg) by mouth daily for 30 days, Disp: 30 capsule, Rfl: 0    [START ON 10/25/2023] amphetamine-dextroamphetamine (ADDERALL XR) 15 MG 24 hr capsule, Take 1 capsule (15 mg) by  mouth daily for 30 days, Disp: 30 capsule, Rfl: 0    acetaminophen (TYLENOL) 500 MG tablet, Take 1,000 mg by mouth, Disp: , Rfl:     albuterol (PROAIR HFA/PROVENTIL HFA/VENTOLIN HFA) 108 (90 Base) MCG/ACT inhaler, Inhale 2 puffs into the lungs every 6 hours as needed for shortness of breath, wheezing or cough, Disp: 18 g, Rfl: 0    albuterol (PROAIR HFA/PROVENTIL HFA/VENTOLIN HFA) 108 (90 Base) MCG/ACT inhaler, Inhale 2 puffs into the lungs every 6 hours as needed for shortness of breath / dyspnea or wheezing, Disp: 18 g, Rfl: 4    amitriptyline (ELAVIL) 10 MG tablet, Take 0.5 tablets (5 mg) by mouth At Bedtime, Disp: 90 tablet, Rfl: 1    amphetamine-dextroamphetamine (ADDERALL) 10 MG tablet, Take 1 tablet (10 mg) by mouth daily for 30 days, Disp: 30 tablet, Rfl: 0    fluticasone (FLONASE) 50 MCG/ACT spray, Spray 1 spray into both nostrils daily, Disp: , Rfl:     gabapentin (NEURONTIN) 300 MG capsule, Take 1 capsule (300 mg) by mouth nightly as needed (pain), Disp: 90 capsule, Rfl: 1    levothyroxine (SYNTHROID/LEVOTHROID) 88 MCG tablet, Take 1 tablet (88 mcg) by mouth daily before breakfast, Disp: 90 tablet, Rfl: 3    predniSONE (DELTASONE) 20 MG tablet, Take 1 tablet (20 mg) by mouth daily, Disp: 5 tablet, Rfl: 0

## 2023-08-24 ENCOUNTER — VIRTUAL VISIT (OUTPATIENT)
Dept: INTERNAL MEDICINE | Facility: CLINIC | Age: 38
End: 2023-08-24
Payer: COMMERCIAL

## 2023-08-24 DIAGNOSIS — F32.0 CURRENT MILD EPISODE OF MAJOR DEPRESSIVE DISORDER WITHOUT PRIOR EPISODE (H): ICD-10-CM

## 2023-08-24 DIAGNOSIS — R43.8 METALLIC TASTE: ICD-10-CM

## 2023-08-24 DIAGNOSIS — D72.819 LEUKOPENIA, UNSPECIFIED TYPE: ICD-10-CM

## 2023-08-24 DIAGNOSIS — R41.840 DIFFICULTY CONCENTRATING: Primary | ICD-10-CM

## 2023-08-24 DIAGNOSIS — R20.0 LOSS OF SENSATION: ICD-10-CM

## 2023-08-24 PROBLEM — F32.9 MAJOR DEPRESSION, SINGLE EPISODE: Status: ACTIVE | Noted: 2023-08-24

## 2023-08-24 PROCEDURE — 99213 OFFICE O/P EST LOW 20 MIN: CPT | Mod: VID | Performed by: NURSE PRACTITIONER

## 2023-08-24 RX ORDER — DEXTROAMPHETAMINE SACCHARATE, AMPHETAMINE ASPARTATE MONOHYDRATE, DEXTROAMPHETAMINE SULFATE AND AMPHETAMINE SULFATE 3.75; 3.75; 3.75; 3.75 MG/1; MG/1; MG/1; MG/1
15 CAPSULE, EXTENDED RELEASE ORAL DAILY
Qty: 30 CAPSULE | Refills: 0 | Status: SHIPPED | OUTPATIENT
Start: 2023-08-24 | End: 2023-09-23

## 2023-08-24 RX ORDER — DEXTROAMPHETAMINE SACCHARATE, AMPHETAMINE ASPARTATE MONOHYDRATE, DEXTROAMPHETAMINE SULFATE AND AMPHETAMINE SULFATE 3.75; 3.75; 3.75; 3.75 MG/1; MG/1; MG/1; MG/1
15 CAPSULE, EXTENDED RELEASE ORAL DAILY
Qty: 30 CAPSULE | Refills: 0 | Status: SHIPPED | OUTPATIENT
Start: 2023-10-25 | End: 2023-11-17

## 2023-08-24 RX ORDER — DEXTROAMPHETAMINE SACCHARATE, AMPHETAMINE ASPARTATE MONOHYDRATE, DEXTROAMPHETAMINE SULFATE AND AMPHETAMINE SULFATE 3.75; 3.75; 3.75; 3.75 MG/1; MG/1; MG/1; MG/1
15 CAPSULE, EXTENDED RELEASE ORAL DAILY
Qty: 30 CAPSULE | Refills: 0 | Status: SHIPPED | OUTPATIENT
Start: 2023-09-24 | End: 2023-10-24

## 2023-09-06 ENCOUNTER — PATIENT OUTREACH (OUTPATIENT)
Dept: CARE COORDINATION | Facility: CLINIC | Age: 38
End: 2023-09-06
Payer: COMMERCIAL

## 2023-09-06 NOTE — NURSING NOTE
"Chief Complaint   Patient presents with     Physical       Initial /68 (BP Location: Right arm, Patient Position: Chair, Cuff Size: Adult Regular)  Pulse 70  Temp 98.4  F (36.9  C) (Tympanic)  Resp 16  Ht 5' 5\" (1.651 m)  Wt 168 lb 12.8 oz (76.6 kg)  LMP 05/17/2017 (Approximate)  SpO2 97%  BMI 28.09 kg/m2 Estimated body mass index is 28.09 kg/(m^2) as calculated from the following:    Height as of this encounter: 5' 5\" (1.651 m).    Weight as of this encounter: 168 lb 12.8 oz (76.6 kg).  Medication Reconciliation: unable or not appropriate to perform       Loreto Robert MA      "
Continue Regimen: Ivermectin 1 % topical cream
Render In Strict Bullet Format?: No
Detail Level: Zone

## 2023-09-11 ENCOUNTER — TRANSFERRED RECORDS (OUTPATIENT)
Dept: HEALTH INFORMATION MANAGEMENT | Facility: CLINIC | Age: 38
End: 2023-09-11
Payer: COMMERCIAL

## 2023-09-12 ENCOUNTER — MYC REFILL (OUTPATIENT)
Dept: INTERNAL MEDICINE | Facility: CLINIC | Age: 38
End: 2023-09-12
Payer: COMMERCIAL

## 2023-09-12 DIAGNOSIS — R41.840 DIFFICULTY CONCENTRATING: ICD-10-CM

## 2023-09-12 RX ORDER — DEXTROAMPHETAMINE SACCHARATE, AMPHETAMINE ASPARTATE, DEXTROAMPHETAMINE SULFATE AND AMPHETAMINE SULFATE 2.5; 2.5; 2.5; 2.5 MG/1; MG/1; MG/1; MG/1
10 TABLET ORAL DAILY
Qty: 30 TABLET | Refills: 0 | Status: SHIPPED | OUTPATIENT
Start: 2023-09-15 | End: 2023-10-12

## 2023-09-28 DIAGNOSIS — F32.0 MILD MAJOR DEPRESSION (H): ICD-10-CM

## 2023-09-28 RX ORDER — AMITRIPTYLINE HYDROCHLORIDE 10 MG/1
5 TABLET ORAL AT BEDTIME
Qty: 45 TABLET | Refills: 0 | Status: SHIPPED | OUTPATIENT
Start: 2023-09-28 | End: 2024-01-18

## 2023-09-28 NOTE — TELEPHONE ENCOUNTER
Refill request for amitriptyline       Thank you,  Rolando Feliciano Jr., CMA on 9/28/2023 at 6:50 AM

## 2023-10-04 NOTE — COMMUNITY RESOURCES LIST (ENGLISH)
10/04/2023   [facility] [employer]  N/A  For questions about this resource list or additional care needs, please contact your primary care clinic or care manager.  Phone: [phone]   Email: N/A   Address: [address] [place] [id number]   Hours: N/A        Financial Stability       Rent and mortgage payment assistance  1  [facility] ([facility] - [place] Office - Supportive Housing Assistance Program (SHAP) Distance: 2.64 miles      Phone/Virtual   [address] [place] [address]  Language: English, Hmong, Janet, Cook Islander  Hours: Mon - Fri 8:30 AM - 5:00 PM  Fees: Free   Phone: (522) 421-3410 Email: nora@[employer].org Website: http://www.[email or web address]/[email or web address]-impact-areas/     2  [facility] Stability - Rent and Mortgage Payment Assistance Distance: 4.04 miles      Phone/Virtual   [address] [place] [address]  Language: English, Hmong, Vietnamese  Hours: Mon - Fri Appt. Only  Fees: Free   Phone: (584) 320-2355 Website: [email or web address]/          Important Numbers & Websites       Emergency Services   911  [facility] Services   311  Poison Control   (961) 920-5865  Suicide Prevention Lifeline   (993) 163-4525 (TALK)  Child Abuse Hotline   (963) 508-7779 (4-A-Child)  Sexual Assault Hotline   (880) 308-8051 (HOPE)  National Runaway Safeline   (357) 648-4385 (RUNAWAY)  All-Options Talkline   (433) 914-2575  Substance Abuse Referral   (340) 498-2358 (HELP)

## 2023-10-05 ENCOUNTER — OFFICE VISIT (OUTPATIENT)
Dept: INTERNAL MEDICINE | Facility: CLINIC | Age: 38
End: 2023-10-05
Payer: COMMERCIAL

## 2023-10-05 VITALS
WEIGHT: 178 LBS | TEMPERATURE: 97.6 F | OXYGEN SATURATION: 100 % | DIASTOLIC BLOOD PRESSURE: 64 MMHG | HEART RATE: 71 BPM | BODY MASS INDEX: 29.62 KG/M2 | SYSTOLIC BLOOD PRESSURE: 100 MMHG

## 2023-10-05 DIAGNOSIS — G93.32 CHRONIC FATIGUE SYNDROME: ICD-10-CM

## 2023-10-05 DIAGNOSIS — Z13.9 ENCOUNTER FOR SCREENING INVOLVING SOCIAL DETERMINANTS OF HEALTH (SDOH): Primary | ICD-10-CM

## 2023-10-05 DIAGNOSIS — F33.9 RECURRENT MAJOR DEPRESSIVE DISORDER, REMISSION STATUS UNSPECIFIED (H): ICD-10-CM

## 2023-10-05 DIAGNOSIS — L65.9 HAIR THINNING: ICD-10-CM

## 2023-10-05 DIAGNOSIS — B37.2 CANDIDIASIS OF SKIN: ICD-10-CM

## 2023-10-05 DIAGNOSIS — M62.81 MUSCLE WEAKNESS (GENERALIZED): ICD-10-CM

## 2023-10-05 LAB
ALBUMIN SERPL BCG-MCNC: 4.5 G/DL (ref 3.5–5.2)
ALBUMIN UR-MCNC: NEGATIVE MG/DL
ALP SERPL-CCNC: 67 U/L (ref 35–104)
ALT SERPL W P-5'-P-CCNC: 17 U/L (ref 0–50)
ANION GAP SERPL CALCULATED.3IONS-SCNC: 10 MMOL/L (ref 7–15)
APPEARANCE UR: CLEAR
AST SERPL W P-5'-P-CCNC: 21 U/L (ref 0–45)
BASO+EOS+MONOS # BLD AUTO: ABNORMAL 10*3/UL
BASO+EOS+MONOS NFR BLD AUTO: ABNORMAL %
BASOPHILS # BLD AUTO: 0 10E3/UL (ref 0–0.2)
BASOPHILS NFR BLD AUTO: 1 %
BILIRUB SERPL-MCNC: 0.4 MG/DL
BILIRUB UR QL STRIP: NEGATIVE
BUN SERPL-MCNC: 6.7 MG/DL (ref 6–20)
CALCIUM SERPL-MCNC: 9.6 MG/DL (ref 8.6–10)
CHLORIDE SERPL-SCNC: 107 MMOL/L (ref 98–107)
COLOR UR AUTO: YELLOW
CREAT SERPL-MCNC: 0.79 MG/DL (ref 0.51–0.95)
DEPRECATED HCO3 PLAS-SCNC: 24 MMOL/L (ref 22–29)
EGFRCR SERPLBLD CKD-EPI 2021: >90 ML/MIN/1.73M2
EOSINOPHIL # BLD AUTO: 0 10E3/UL (ref 0–0.7)
EOSINOPHIL NFR BLD AUTO: 1 %
ERYTHROCYTE [DISTWIDTH] IN BLOOD BY AUTOMATED COUNT: 12.2 % (ref 10–15)
FERRITIN SERPL-MCNC: 123 NG/ML (ref 6–175)
GLUCOSE SERPL-MCNC: 94 MG/DL (ref 70–99)
GLUCOSE UR STRIP-MCNC: NEGATIVE MG/DL
HCT VFR BLD AUTO: 39.3 % (ref 35–47)
HGB BLD-MCNC: 13.7 G/DL (ref 11.7–15.7)
HGB UR QL STRIP: NEGATIVE
IMM GRANULOCYTES # BLD: 0 10E3/UL
IMM GRANULOCYTES NFR BLD: 0 %
KETONES UR STRIP-MCNC: NEGATIVE MG/DL
LEUKOCYTE ESTERASE UR QL STRIP: NEGATIVE
LYMPHOCYTES # BLD AUTO: 1.4 10E3/UL (ref 0.8–5.3)
LYMPHOCYTES NFR BLD AUTO: 44 %
MAGNESIUM SERPL-MCNC: 2.1 MG/DL (ref 1.7–2.3)
MCH RBC QN AUTO: 31.1 PG (ref 26.5–33)
MCHC RBC AUTO-ENTMCNC: 34.9 G/DL (ref 31.5–36.5)
MCV RBC AUTO: 89 FL (ref 78–100)
MONOCYTES # BLD AUTO: 0.3 10E3/UL (ref 0–1.3)
MONOCYTES NFR BLD AUTO: 10 %
NEUTROPHILS # BLD AUTO: 1.5 10E3/UL (ref 1.6–8.3)
NEUTROPHILS NFR BLD AUTO: 45 %
NITRATE UR QL: NEGATIVE
PH UR STRIP: 5 [PH] (ref 5–8)
PLATELET # BLD AUTO: 154 10E3/UL (ref 150–450)
POTASSIUM SERPL-SCNC: 4.4 MMOL/L (ref 3.4–5.3)
PROT SERPL-MCNC: 8.3 G/DL (ref 6.4–8.3)
RBC # BLD AUTO: 4.4 10E6/UL (ref 3.8–5.2)
SODIUM SERPL-SCNC: 141 MMOL/L (ref 135–145)
SP GR UR STRIP: <=1.005 (ref 1–1.03)
TSH SERPL DL<=0.005 MIU/L-ACNC: 1.35 UIU/ML (ref 0.3–4.2)
UROBILINOGEN UR STRIP-ACNC: 0.2 E.U./DL
VIT B12 SERPL-MCNC: 526 PG/ML (ref 232–1245)
VIT D+METAB SERPL-MCNC: 38 NG/ML (ref 20–50)
WBC # BLD AUTO: 3.2 10E3/UL (ref 4–11)

## 2023-10-05 PROCEDURE — 90472 IMMUNIZATION ADMIN EACH ADD: CPT | Performed by: NURSE PRACTITIONER

## 2023-10-05 PROCEDURE — 85025 COMPLETE CBC W/AUTO DIFF WBC: CPT | Performed by: NURSE PRACTITIONER

## 2023-10-05 PROCEDURE — 85390 FIBRINOLYSINS SCREEN I&R: CPT | Performed by: PATHOLOGY

## 2023-10-05 PROCEDURE — 85730 THROMBOPLASTIN TIME PARTIAL: CPT | Performed by: NURSE PRACTITIONER

## 2023-10-05 PROCEDURE — 99214 OFFICE O/P EST MOD 30 MIN: CPT | Mod: 25 | Performed by: NURSE PRACTITIONER

## 2023-10-05 PROCEDURE — 90686 IIV4 VACC NO PRSV 0.5 ML IM: CPT | Performed by: NURSE PRACTITIONER

## 2023-10-05 PROCEDURE — 82306 VITAMIN D 25 HYDROXY: CPT | Performed by: NURSE PRACTITIONER

## 2023-10-05 PROCEDURE — 90471 IMMUNIZATION ADMIN: CPT | Performed by: NURSE PRACTITIONER

## 2023-10-05 PROCEDURE — 81003 URINALYSIS AUTO W/O SCOPE: CPT | Performed by: NURSE PRACTITIONER

## 2023-10-05 PROCEDURE — 82607 VITAMIN B-12: CPT | Performed by: NURSE PRACTITIONER

## 2023-10-05 PROCEDURE — 91320 SARSCV2 VAC 30MCG TRS-SUC IM: CPT | Performed by: NURSE PRACTITIONER

## 2023-10-05 PROCEDURE — 83735 ASSAY OF MAGNESIUM: CPT | Performed by: NURSE PRACTITIONER

## 2023-10-05 PROCEDURE — 90746 HEPB VACCINE 3 DOSE ADULT IM: CPT | Performed by: NURSE PRACTITIONER

## 2023-10-05 PROCEDURE — 36415 COLL VENOUS BLD VENIPUNCTURE: CPT | Performed by: NURSE PRACTITIONER

## 2023-10-05 PROCEDURE — 90480 ADMN SARSCOV2 VAC 1/ONLY CMP: CPT | Performed by: NURSE PRACTITIONER

## 2023-10-05 PROCEDURE — 99000 SPECIMEN HANDLING OFFICE-LAB: CPT | Performed by: NURSE PRACTITIONER

## 2023-10-05 PROCEDURE — 84443 ASSAY THYROID STIM HORMONE: CPT | Performed by: NURSE PRACTITIONER

## 2023-10-05 PROCEDURE — 82728 ASSAY OF FERRITIN: CPT | Performed by: NURSE PRACTITIONER

## 2023-10-05 PROCEDURE — 80053 COMPREHEN METABOLIC PANEL: CPT | Performed by: NURSE PRACTITIONER

## 2023-10-05 PROCEDURE — 84207 ASSAY OF VITAMIN B-6: CPT | Mod: 90 | Performed by: NURSE PRACTITIONER

## 2023-10-05 PROCEDURE — 85613 RUSSELL VIPER VENOM DILUTED: CPT | Performed by: NURSE PRACTITIONER

## 2023-10-05 RX ORDER — NALTREXONE HYDROCHLORIDE 50 MG/1
50 TABLET, FILM COATED ORAL
COMMUNITY
Start: 2023-07-27 | End: 2024-04-11

## 2023-10-05 RX ORDER — NYSTATIN 100000 U/G
CREAM TOPICAL 2 TIMES DAILY
Qty: 30 G | Refills: 0 | Status: SHIPPED | OUTPATIENT
Start: 2023-10-05 | End: 2024-04-11

## 2023-10-05 RX ORDER — FLUCONAZOLE 150 MG/1
150 TABLET ORAL
Qty: 3 TABLET | Refills: 0 | Status: SHIPPED | OUTPATIENT
Start: 2023-10-05 | End: 2023-10-12

## 2023-10-05 NOTE — COMMUNITY RESOURCES LIST (ENGLISH)
10/05/2023   [facility] [employer]  N/A  For questions about this resource list or additional care needs, please contact your primary care clinic or care manager.  Phone: [phone]   Email: N/A   Address: [address] [place] [address]   Hours: N/A        Financial Stability       Rent and mortgage payment assistance  1  [facility] ([facility]) - [place] Office - Supportive Housing Assistance Program (SHAP) Distance: 2.64 miles      Phone/Virtual   [address] [place] [address]  Language: English, Hmong, Janet, Equatorial Guinean  Hours: Mon - Fri 8:30 AM - 5:00 PM  Fees: Free   Phone: (444) 588-4642 Email: nora@[email or web address] Website: http://[email or web address]/[facility]-impact-areas/     2  [facility] Stability - Rent and Mortgage Payment Assistance Distance: 4.04 miles      Phone/Virtual   [address] [place] [address]  Language: English, Hmong, Portuguese  Hours: Mon - Fri Appt. Only  Fees: Free   Phone: (689) 727-8061 Website: [email or web address]/          Important Numbers & Websites       Emergency Services   911  [facility] Services   311  Poison Control   (755) 258-6761  Suicide Prevention Lifeline   (706) 553-8732 (TALK)  Child Abuse Hotline   (305) 814-4093 (4-A-Child)  Sexual Assault Hotline   (211) 565-1973 (HOPE)  National Runaway Safeline   (592) 839-5010 (RUNAWAY)  All-Options Talkline   (133) 160-3704  Substance Abuse Referral   (290) 423-7682 (HELP)

## 2023-10-05 NOTE — PROGRESS NOTES
"  Assessment & Plan   Problem List Items Addressed This Visit          Nervous and Auditory    Chronic fatigue syndrome    Relevant Medications    nystatin (MYCOSTATIN) 108724 UNIT/GM external cream    Other Relevant Orders    Adult Rheumatology  Referral       Behavioral    Major depression, recurrent (H)     Other Visit Diagnoses       Encounter for screening involving social determinants of health (SDoH)    -  Primary    Candidiasis of skin        Relevant Medications    fluconazole (DIFLUCAN) 150 MG tablet    nystatin (MYCOSTATIN) 308751 UNIT/GM external cream    Muscle weakness (generalized)        Relevant Orders    Adult Rheumatology  Referral    Lupus Anticoagulant Panel    Hair thinning        Relevant Medications    nystatin (MYCOSTATIN) 948563 UNIT/GM external cream    Other Relevant Orders    TSH with free T4 reflex    UA Macroscopic with reflex to Microscopic and Culture - Lab Collect    CBC with platelets and differential    Comprehensive metabolic panel    Vitamin B12    Vitamin B6    Ferritin    Magnesium    Vitamin D deficiency screening           Patient reports more physical fatigue when typically has had mental fatigue, brittle nails, thinning hair ongoing fatigue and muscle weakness.    Patient was last seen by Rheumatology at the [facility] 3 years ago and does have a history of positive LETICIA.  Does have upcoming Rheumatology appointment at [facility] in December            BMI:   Estimated body mass index is 29.62 kg/m  as calculated from the following:    Height as of 4/7/23: 1.651 m (5' 5\").    Weight as of this encounter: 80.7 kg (178 lb).       MEDICATIONS:   Orders Placed This Encounter   Medications    naltrexone (DEPADE/REVIA) 50 MG tablet     Sig: Take 50 mg by mouth    fluconazole (DIFLUCAN) 150 MG tablet     Sig: Take 1 tablet (150 mg) by mouth every 3 days for 3 doses     Dispense:  3 tablet     Refill:  0    nystatin (MYCOSTATIN) 108540 UNIT/GM external cream "     Sig: Apply topically 2 times daily     Dispense:  30 g     Refill:  0     There are no discontinued medications.       - Continue other medications without change  CONSULTATION/REFERRAL to Rheumatology     [facility] RANDY Mcdaniels is a 37 year old, presenting for the following health issues:  Rash (Pt states the rash start about 4 days ago. Located around my belly, I try itching cream but nothing make it better but burn. Moist make it spread more, baking soda kept it from spreading. ) and Recheck Medication        10/5/2023     9:51 AM   Additional Questions   Roomed by Jeff Sharma   Accompanied by self       History of Present Illness       Reason for visit:  I have a rash on my belly. Also lab work for medication renewal and to check on my blood sugar to make sure I do not have diabetes. Also flu shot  Symptom onset:  1-3 days ago  Symptoms include:  Red, ichy, painful and possibly smelly rash in and around my belly button.  Symptom intensity:  Moderate  Symptom progression:  Worsening  Had these symptoms before:  No  What makes it worse:  Heat, moisture and covering it  What makes it better:  Not really but I've been using a baking soda and water mask to help slow the spread    She eats 4 or more servings of fruits and vegetables daily.She consumes 0 sweetened beverage(s) daily.She exercises with enough effort to increase her heart rate 20 to 29 minutes per day.  She exercises with enough effort to increase her heart rate 3 or less days per week.   She is taking medications regularly.                 Review of Systems   Skin:  Positive for rash.            Objective    /64 (BP Location: Right arm, Patient Position: Sitting, Cuff Size: Adult Regular)   Pulse 71   Temp 97.6  F (36.4  C) (Oral)   Wt 80.7 kg (178 lb)   LMP  (LMP Unknown)   SpO2 100%   BMI 29.62 kg/m    Body mass index is 29.62 kg/m .  Physical Exam   GENERAL: healthy, alert and no  distress  EYES: Eyes grossly normal to inspection, conjunctivae and sclerae normal  RESP: respirations regular nonlabored   PSYCH: mentation appears normal, affect normal/bright  SKIN: candidiasis rash umbilicus and abd fold   Lab on 07/24/2023   Component Date Value Ref Range Status    Final Diagnosis 07/24/2023    Final                    Value:This result contains rich text formatting which cannot be displayed here.    Comment 07/24/2023    Final                    Value:This result contains rich text formatting which cannot be displayed here.    Clinical Information 07/24/2023    Final                    Value:This result contains rich text formatting which cannot be displayed here.    Performing Labs 07/24/2023    Final                    Value:This result contains rich text formatting which cannot be displayed here.    WBC Count 07/24/2023 2.8 (L)  4.0 - 11.0 10e3/uL Final    RBC Count 07/24/2023 4.07  3.80 - 5.20 10e6/uL Final    Hemoglobin 07/24/2023 12.2  11.7 - 15.7 g/dL Final    Hematocrit 07/24/2023 35.9  35.0 - 47.0 % Final    MCV 07/24/2023 88  78 - 100 fL Final    MCH 07/24/2023 30.0  26.5 - 33.0 pg Final    MCHC 07/24/2023 34.0  31.5 - 36.5 g/dL Final    RDW 07/24/2023 11.6  10.0 - 15.0 % Final    Platelet Count 07/24/2023 157  150 - 450 10e3/uL Final    % Neutrophils 07/24/2023 39  % Final    % Lymphocytes 07/24/2023 49  % Final    % Monocytes 07/24/2023 11  % Final    % Eosinophils 07/24/2023 1  % Final    % Basophils 07/24/2023 1  % Final    % Immature Granulocytes 07/24/2023 0  % Final    Absolute Neutrophils 07/24/2023 1.1 (L)  1.6 - 8.3 10e3/uL Final    Absolute Lymphocytes 07/24/2023 1.4  0.8 - 5.3 10e3/uL Final    Absolute Monocytes 07/24/2023 0.3  0.0 - 1.3 10e3/uL Final    Absolute Eosinophils 07/24/2023 0.0  0.0 - 0.7 10e3/uL Final    Absolute Basophils 07/24/2023 0.0  0.0 - 0.2 10e3/uL Final    Absolute Immature Granulocytes 07/24/2023 0.0  <=0.4 10e3/uL Final    % Reticulocyte 07/24/2023  0.8  0.8 - 2.7 % Final    Absolute Reticulocyte 07/24/2023 0.031  0.010 - 0.110 10e6/uL Final

## 2023-10-06 LAB
DRVVT SCREEN RATIO: 0.86
INR PPP: 1.04 (ref 0.85–1.15)
LA PPP-IMP: NEGATIVE
LUPUS INTERPRETATION: NORMAL
PTT RATIO: 0.95
THROMBIN TIME: 17 SECONDS (ref 13–19)

## 2023-10-08 LAB — PYRIDOXAL PHOS SERPL-SCNC: 88.5 NMOL/L

## 2023-10-09 ENCOUNTER — TELEPHONE (OUTPATIENT)
Dept: INTERNAL MEDICINE | Facility: CLINIC | Age: 38
End: 2023-10-09
Payer: COMMERCIAL

## 2023-10-09 DIAGNOSIS — G93.32 CHRONIC FATIGUE SYNDROME: ICD-10-CM

## 2023-10-09 DIAGNOSIS — M62.81 MUSCLE WEAKNESS (GENERALIZED): ICD-10-CM

## 2023-10-09 DIAGNOSIS — Z82.0 FAMILY HISTORY OF MS (MULTIPLE SCLEROSIS): ICD-10-CM

## 2023-10-09 DIAGNOSIS — M79.7 FIBROMYALGIA: Primary | Chronic | ICD-10-CM

## 2023-10-09 NOTE — TELEPHONE ENCOUNTER
Diagnosis on referral:   Diagnosis   G93.32 (ICD-10-CM) - Chronic fatigue syndrome   M62.81 (ICD-10-CM) - Muscle weakness (generalized)         Valadez is requiring more information as to why patient is being referred, please advise.

## 2023-10-09 NOTE — TELEPHONE ENCOUNTER
Order/Referral Request     Who is requesting: Patient     Orders being requested: [facility] Referral     Reason service is needed/diagnosis: referral was placed on 10/5/23 however, [facility] advised patient that they will not accept referral due to vaue diagnosis listed on referral.     [facility] has requested provided call [phone]     When are orders needed by: as soon as feasible.     Has this been discussed with Provider: Yes     Does patient have a preference on a Group/Provider/Facility? [facility]     Does patient have an appointment scheduled?: No     Where to send orders: Fax     Could we send this information to you in [place] or would you prefer to receive a phone call?:   Patient would like to be contacted via [employer]

## 2023-10-10 NOTE — TELEPHONE ENCOUNTER
Spoke to [facility] referral coordinator, Myrna, Rheumatology clinic does not see patients for chronic fatigue or fibromyalgia even if pt seen in past.  They recommend patient see general Internal Medicine/Integrative Medicine for this.    Message sent to pt

## 2023-10-10 NOTE — TELEPHONE ENCOUNTER
Order sent to [facility]    Insurance reviewed: [facility] Open Access    Care type Open Access  Product [place] - Hpic Lg FI Ez  Product type COMMERCIAL  Administrative group [id number] - [facility] Access  [facility] assignment 010 - Open Access    Member #[id number]  Group #[id number]    Benefit record start date  01/01/2023  Benefit record end date  12/31/2023    ~Michelle  [facility] Referral Coordinator

## 2023-10-11 DIAGNOSIS — M47.26 OSTEOARTHRITIS OF SPINE WITH RADICULOPATHY, LUMBAR REGION: ICD-10-CM

## 2023-10-11 RX ORDER — GABAPENTIN 300 MG/1
300 CAPSULE ORAL
Qty: 90 CAPSULE | Refills: 1 | Status: SHIPPED | OUTPATIENT
Start: 2023-10-11 | End: 2024-01-18

## 2023-10-11 NOTE — TELEPHONE ENCOUNTER
Received fax from pharmacy requesting refill for gabapentin    Last refill: 04/05/2023  Patient last seen: 10/05/2023

## 2023-10-12 ENCOUNTER — MYC REFILL (OUTPATIENT)
Dept: INTERNAL MEDICINE | Facility: CLINIC | Age: 38
End: 2023-10-12
Payer: COMMERCIAL

## 2023-10-12 DIAGNOSIS — R41.840 DIFFICULTY CONCENTRATING: ICD-10-CM

## 2023-10-12 RX ORDER — DEXTROAMPHETAMINE SACCHARATE, AMPHETAMINE ASPARTATE, DEXTROAMPHETAMINE SULFATE AND AMPHETAMINE SULFATE 2.5; 2.5; 2.5; 2.5 MG/1; MG/1; MG/1; MG/1
10 TABLET ORAL DAILY
Qty: 30 TABLET | Refills: 0 | Status: SHIPPED | OUTPATIENT
Start: 2023-10-12 | End: 2023-11-17

## 2023-11-06 ENCOUNTER — TELEPHONE (OUTPATIENT)
Dept: INTERNAL MEDICINE | Facility: CLINIC | Age: 38
End: 2023-11-06
Payer: COMMERCIAL

## 2023-11-06 NOTE — TELEPHONE ENCOUNTER
[facility]       Please sign order.       Thank you,  Rolando Feliciano Jr., CMA on 11/6/2023 at 6:22 AM

## 2023-11-08 ENCOUNTER — ALLIED HEALTH/NURSE VISIT (OUTPATIENT)
Dept: FAMILY MEDICINE | Facility: CLINIC | Age: 38
End: 2023-11-08
Payer: COMMERCIAL

## 2023-11-08 DIAGNOSIS — Z23 ENCOUNTER FOR IMMUNIZATION: Primary | ICD-10-CM

## 2023-11-08 PROBLEM — F33.1 MAJOR DEPRESSIVE DISORDER, RECURRENT, MODERATE (H): Status: ACTIVE | Noted: 2023-06-15

## 2023-11-08 PROBLEM — F50.89 OTHER SPECIFIED EATING DISORDER: Status: ACTIVE | Noted: 2023-06-15

## 2023-11-08 PROBLEM — G25.0 TREMOR, ESSENTIAL: Status: ACTIVE | Noted: 2023-07-20

## 2023-11-08 PROBLEM — G83.20 MONOPARESIS OF UPPER EXTREMITY (H): Status: ACTIVE | Noted: 2023-07-20

## 2023-11-08 PROBLEM — R29.898 LEG WEAKNESS, BILATERAL: Status: ACTIVE | Noted: 2023-09-11

## 2023-11-08 PROCEDURE — 90746 HEPB VACCINE 3 DOSE ADULT IM: CPT

## 2023-11-08 PROCEDURE — 90471 IMMUNIZATION ADMIN: CPT

## 2023-11-08 PROCEDURE — 99207 PR NO CHARGE NURSE ONLY: CPT

## 2023-11-08 NOTE — PROGRESS NOTES
Prior to immunization administration, verified patients identity using patient s name and date of birth. Please see Immunization Activity for additional information.     Screening Questionnaire for Adult Immunization    Are you sick today?   No   Do you have allergies to medications, food, a vaccine component or latex?   No   Have you ever had a serious reaction after receiving a vaccination?   No   Do you have a long-term health problem with heart, lung, kidney, or metabolic disease (e.g., diabetes), asthma, a blood disorder, no spleen, complement component deficiency, a cochlear implant, or a spinal fluid leak?  Are you on long-term aspirin therapy?   No   Do you have cancer, leukemia, HIV/AIDS, or any other immune system problem?   No   Do you have a parent, brother, or sister with an immune system problem?   No   In the past 3 months, have you taken medications that affect  your immune system, such as prednisone, other steroids, or anticancer drugs; drugs for the treatment of rheumatoid arthritis, Crohn s disease, or psoriasis; or have you had radiation treatments?   No   Have you had a seizure, or a brain or other nervous system problem?   No   During the past year, have you received a transfusion of blood or blood    products, or been given immune (gamma) globulin or antiviral drug?   No   For women: Are you pregnant or is there a chance you could become       pregnant during the next month?   No   Have you received any vaccinations in the past 4 weeks?   No     Immunization questionnaire answers were all negative.    I have reviewed the following standing orders:   This patient is due and qualifies for the Hepatitis B vaccine.    Click here for Hepatitis B Standing Order    I have reviewed the vaccines inclusion and exclusion criteria; No concerns regarding eligibility.     Patient instructed to remain in clinic for 15 minutes afterwards, and to report any adverse reactions.     Screening performed by  Cece Smith MA on 11/8/2023 at 9:31 AM.

## 2023-11-17 ENCOUNTER — MYC REFILL (OUTPATIENT)
Dept: INTERNAL MEDICINE | Facility: CLINIC | Age: 38
End: 2023-11-17
Payer: COMMERCIAL

## 2023-11-17 DIAGNOSIS — R41.840 DIFFICULTY CONCENTRATING: ICD-10-CM

## 2023-11-17 NOTE — TELEPHONE ENCOUNTER
Refills have been requested for the following medications:         amphetamine-dextroamphetamine (ADDERALL XR) 15 MG 24 hr capsule [Nadya Villalpando]         amphetamine-dextroamphetamine (ADDERALL) 10 MG tablet [Nadya Villalpando]     Preferred pharmacy: [id number] [place], [place] - [address]     Sign and send if appropriate

## 2023-11-21 RX ORDER — DEXTROAMPHETAMINE SACCHARATE, AMPHETAMINE ASPARTATE MONOHYDRATE, DEXTROAMPHETAMINE SULFATE AND AMPHETAMINE SULFATE 3.75; 3.75; 3.75; 3.75 MG/1; MG/1; MG/1; MG/1
15 CAPSULE, EXTENDED RELEASE ORAL DAILY
Qty: 30 CAPSULE | Refills: 0 | Status: SHIPPED | OUTPATIENT
Start: 2023-11-21 | End: 2023-12-20

## 2023-11-21 RX ORDER — DEXTROAMPHETAMINE SACCHARATE, AMPHETAMINE ASPARTATE, DEXTROAMPHETAMINE SULFATE AND AMPHETAMINE SULFATE 2.5; 2.5; 2.5; 2.5 MG/1; MG/1; MG/1; MG/1
10 TABLET ORAL DAILY
Qty: 30 TABLET | Refills: 0 | Status: SHIPPED | OUTPATIENT
Start: 2023-11-21 | End: 2023-12-20

## 2023-12-20 ENCOUNTER — MYC REFILL (OUTPATIENT)
Dept: INTERNAL MEDICINE | Facility: CLINIC | Age: 38
End: 2023-12-20
Payer: COMMERCIAL

## 2023-12-20 DIAGNOSIS — R41.840 DIFFICULTY CONCENTRATING: ICD-10-CM

## 2023-12-22 RX ORDER — DEXTROAMPHETAMINE SACCHARATE, AMPHETAMINE ASPARTATE, DEXTROAMPHETAMINE SULFATE AND AMPHETAMINE SULFATE 2.5; 2.5; 2.5; 2.5 MG/1; MG/1; MG/1; MG/1
10 TABLET ORAL DAILY
Qty: 30 TABLET | Refills: 0 | Status: SHIPPED | OUTPATIENT
Start: 2023-12-22 | End: 2024-01-18

## 2023-12-22 RX ORDER — DEXTROAMPHETAMINE SACCHARATE, AMPHETAMINE ASPARTATE MONOHYDRATE, DEXTROAMPHETAMINE SULFATE AND AMPHETAMINE SULFATE 3.75; 3.75; 3.75; 3.75 MG/1; MG/1; MG/1; MG/1
15 CAPSULE, EXTENDED RELEASE ORAL DAILY
Qty: 30 CAPSULE | Refills: 0 | Status: SHIPPED | OUTPATIENT
Start: 2023-12-22 | End: 2024-01-18

## 2024-01-17 ASSESSMENT — ANXIETY QUESTIONNAIRES
3. WORRYING TOO MUCH ABOUT DIFFERENT THINGS: SEVERAL DAYS
IF YOU CHECKED OFF ANY PROBLEMS ON THIS QUESTIONNAIRE, HOW DIFFICULT HAVE THESE PROBLEMS MADE IT FOR YOU TO DO YOUR WORK, TAKE CARE OF THINGS AT HOME, OR GET ALONG WITH OTHER PEOPLE: SOMEWHAT DIFFICULT
GAD7 TOTAL SCORE: 8
5. BEING SO RESTLESS THAT IT IS HARD TO SIT STILL: NOT AT ALL
6. BECOMING EASILY ANNOYED OR IRRITABLE: NEARLY EVERY DAY
GAD7 TOTAL SCORE: 8
2. NOT BEING ABLE TO STOP OR CONTROL WORRYING: SEVERAL DAYS
1. FEELING NERVOUS, ANXIOUS, OR ON EDGE: SEVERAL DAYS
8. IF YOU CHECKED OFF ANY PROBLEMS, HOW DIFFICULT HAVE THESE MADE IT FOR YOU TO DO YOUR WORK, TAKE CARE OF THINGS AT HOME, OR GET ALONG WITH OTHER PEOPLE?: SOMEWHAT DIFFICULT
4. TROUBLE RELAXING: MORE THAN HALF THE DAYS
7. FEELING AFRAID AS IF SOMETHING AWFUL MIGHT HAPPEN: NOT AT ALL
7. FEELING AFRAID AS IF SOMETHING AWFUL MIGHT HAPPEN: NOT AT ALL

## 2024-01-17 ASSESSMENT — PATIENT HEALTH QUESTIONNAIRE - PHQ9: SUM OF ALL RESPONSES TO PHQ QUESTIONS 1-9: 6

## 2024-01-18 ENCOUNTER — VIRTUAL VISIT (OUTPATIENT)
Dept: INTERNAL MEDICINE | Facility: CLINIC | Age: 39
End: 2024-01-18
Payer: COMMERCIAL

## 2024-01-18 DIAGNOSIS — R41.840 DIFFICULTY CONCENTRATING: ICD-10-CM

## 2024-01-18 DIAGNOSIS — Z56.6 WORK STRESS: ICD-10-CM

## 2024-01-18 DIAGNOSIS — M47.26 OSTEOARTHRITIS OF SPINE WITH RADICULOPATHY, LUMBAR REGION: ICD-10-CM

## 2024-01-18 DIAGNOSIS — B37.2 CANDIDIASIS OF SKIN: Primary | ICD-10-CM

## 2024-01-18 PROCEDURE — 99214 OFFICE O/P EST MOD 30 MIN: CPT | Mod: 95 | Performed by: NURSE PRACTITIONER

## 2024-01-18 RX ORDER — GABAPENTIN 300 MG/1
300 CAPSULE ORAL 2 TIMES DAILY
Qty: 90 CAPSULE | Refills: 1 | Status: SHIPPED | OUTPATIENT
Start: 2024-01-18

## 2024-01-18 RX ORDER — DEXTROAMPHETAMINE SACCHARATE, AMPHETAMINE ASPARTATE MONOHYDRATE, DEXTROAMPHETAMINE SULFATE AND AMPHETAMINE SULFATE 5; 5; 5; 5 MG/1; MG/1; MG/1; MG/1
20 CAPSULE, EXTENDED RELEASE ORAL DAILY
Qty: 30 CAPSULE | Refills: 0 | Status: SHIPPED | OUTPATIENT
Start: 2024-01-18 | End: 2024-02-19

## 2024-01-18 RX ORDER — DEXTROAMPHETAMINE SACCHARATE, AMPHETAMINE ASPARTATE MONOHYDRATE, DEXTROAMPHETAMINE SULFATE AND AMPHETAMINE SULFATE 3.75; 3.75; 3.75; 3.75 MG/1; MG/1; MG/1; MG/1
15 CAPSULE, EXTENDED RELEASE ORAL DAILY
Qty: 30 CAPSULE | Refills: 0 | Status: CANCELLED | OUTPATIENT
Start: 2024-01-18

## 2024-01-18 RX ORDER — FLUCONAZOLE 150 MG/1
150 TABLET ORAL
Qty: 3 TABLET | Refills: 0 | Status: SHIPPED | OUTPATIENT
Start: 2024-01-18 | End: 2024-01-25

## 2024-01-18 RX ORDER — DEXTROAMPHETAMINE SACCHARATE, AMPHETAMINE ASPARTATE, DEXTROAMPHETAMINE SULFATE AND AMPHETAMINE SULFATE 2.5; 2.5; 2.5; 2.5 MG/1; MG/1; MG/1; MG/1
10 TABLET ORAL DAILY
Qty: 30 TABLET | Refills: 0 | Status: SHIPPED | OUTPATIENT
Start: 2024-01-18 | End: 2024-02-19

## 2024-01-18 SDOH — HEALTH STABILITY - MENTAL HEALTH: OTHER PHYSICAL AND MENTAL STRAIN RELATED TO WORK: Z56.6

## 2024-01-18 ASSESSMENT — PATIENT HEALTH QUESTIONNAIRE - PHQ9
10. IF YOU CHECKED OFF ANY PROBLEMS, HOW DIFFICULT HAVE THESE PROBLEMS MADE IT FOR YOU TO DO YOUR WORK, TAKE CARE OF THINGS AT HOME, OR GET ALONG WITH OTHER PEOPLE: SOMEWHAT DIFFICULT
SUM OF ALL RESPONSES TO PHQ QUESTIONS 1-9: 6

## 2024-01-18 ASSESSMENT — ANXIETY QUESTIONNAIRES: GAD7 TOTAL SCORE: 8

## 2024-01-18 NOTE — PROGRESS NOTES
"Enedelia is a 38 year old who is being evaluated via a billable video visit.      How would you like to obtain your AVS? MyChart  If the video visit is dropped, the invitation should be resent by: Text to cell phone: [phone]  Will anyone else be joining your video visit? No          Assessment & Plan   Problem List Items Addressed This Visit    None  Visit Diagnoses       Candidiasis of skin    -  Primary    Relevant Medications    fluconazole (DIFLUCAN) 150 MG tablet    Difficulty concentrating        Relevant Medications    amphetamine-dextroamphetamine (ADDERALL) 10 MG tablet    amphetamine-dextroamphetamine (ADDERALL XR) 20 MG 24 hr capsule    Other Relevant Orders    Adult Mental Health  Referral    Osteoarthritis of spine with radiculopathy, lumbar region        Relevant Medications    gabapentin (NEURONTIN) 300 MG capsule    Work stress        Relevant Orders    Adult Mental Health  Referral           200mg of hydroxychloroquine from Rheumatology     Patient was seen by hematology for neutropenia and thrombocytopenia.  Patient was advised no concern for current numbers.  Patient noted to have elevated VonWillebrand Agn and felt this was elevated secondary to patient positive LETICIA and positive SSA antibody.      \"Weird rash\" noted on arm pits.  Patient prescribed diflucan     Increase adderal xr to 20mg and continue 10mg adderrall prn    Discussed work stress and excess work hours. Discussed medication, meditation, stress management, therapy.  Patient will be referred to mental health provider for assistance with stress management.     Review of external notes as documented elsewhere in note       MEDICATIONS:   Orders Placed This Encounter   Medications    amphetamine-dextroamphetamine (ADDERALL) 10 MG tablet     Sig: Take 1 tablet (10 mg) by mouth daily     Dispense:  30 tablet     Refill:  0    fluconazole (DIFLUCAN) 150 MG tablet     Sig: Take 1 tablet (150 mg) by mouth every 3 days for 3 " "doses     Dispense:  3 tablet     Refill:  0    gabapentin (NEURONTIN) 300 MG capsule     Sig: Take 1 capsule (300 mg) by mouth 2 times daily     Dispense:  90 capsule     Refill:  1    amphetamine-dextroamphetamine (ADDERALL XR) 20 MG 24 hr capsule     Sig: Take 1 capsule (20 mg) by mouth daily     Dispense:  30 capsule     Refill:  0     Medications Discontinued During This Encounter   Medication Reason    amitriptyline (ELAVIL) 10 MG tablet     gabapentin (NEURONTIN) 300 MG capsule Reorder (No AVS)    amphetamine-dextroamphetamine (ADDERALL XR) 15 MG 24 hr capsule Reorder (No AVS)    amphetamine-dextroamphetamine (ADDERALL) 10 MG tablet Reorder (No AVS)          - Continue other medications without change      Subjective   Enedelia is a 38 year old, presenting for the following health issues:  Refill Request, Results, Stress  (Pt is looking to learn how to manage stress levels), and Medication Request (Pt would like to increase her dosage on Gabapentin and Adderall XR)        1/18/2024     3:28 PM   Additional Questions   Roomed by Rebel JOHNSTON   Accompanied by N/A     History of Present Illness       Reason for visit:  I need prescription increase and refills. I would like to go over recent test results and diagnosis. I would also like some direction on how to better manage my stress and options on doing so.    She eats 2-3 servings of fruits and vegetables daily.She consumes 0 sweetened beverage(s) daily.She exercises with enough effort to increase her heart rate 10 to 19 minutes per day.  She exercises with enough effort to increase her heart rate 3 or less days per week.   She is taking medications regularly.       Depression and Anxiety Follow-Up  How are you doing with your depression since your last visit? Pt states \"probably a little bit worse, it hasn't been my main focus with all of the current stress around work.\"  How are you doing with your anxiety since your last visit?  Worsened - Work has been extremely " "stressful   Are you having other symptoms that might be associated with depression or anxiety? Yes:  Pt states that she is having a very hard time with sleep and still feels very tired throughout the day.    Have you had a significant life event? Job Concerns   Do you have any concerns with your use of alcohol or other drugs? No    Social History     Tobacco Use    Smoking status: Never    Smokeless tobacco: Never    Tobacco comments:     smokers in home   Vaping Use    Vaping Use: Never used   Substance Use Topics    Alcohol use: Yes     Comment: Alcoholic Drinks/day: rarely 4/year    Drug use: Never         7/13/2023     9:32 AM 7/31/2023    11:14 AM 1/17/2024     9:34 PM   PHQ   PHQ-9 Total Score 4 3 6   Q9: Thoughts of better off dead/self-harm past 2 weeks Not at all Not at all Not at all         10/13/2021     8:49 AM 6/8/2023    11:47 AM 1/17/2024     9:35 PM   INA-7 SCORE   Total Score 7 (mild anxiety) 9 (mild anxiety) 8 (mild anxiety)   Total Score 7 9 8         Suicide Assessment Five-step Evaluation and Treatment (SAFE-T)          Review of Systems  Constitutional, neuro, ENT, endocrine, pulmonary, cardiac, gastrointestinal, genitourinary, musculoskeletal, integument and psychiatric systems are negative, except as otherwise noted.      Objective    Vitals - Patient Reported  Weight (Patient Reported): 80.7 kg (178 lb)  Height (Patient Reported): 165.1 cm (5' 5\")  BMI (Based on Pt Reported Ht/Wt): 29.62  Pain Score: No Pain (0)      Vitals:  No vitals were obtained today due to virtual visit.    Physical Exam   GENERAL: alert and no distress  EYES: Eyes grossly normal to inspection.  No discharge or erythema, or obvious scleral/conjunctival abnormalities.  RESP: No audible wheeze, cough, or visible cyanosis.    SKIN: candidiasis rash axillary   NEURO: Cranial nerves grossly intact.  Mentation and speech appropriate for age.  PSYCH: Appropriate affect, tone, and pace of words    Office Visit on 10/05/2023 "   Component Date Value Ref Range Status    INR 10/05/2023 1.04  0.85 - 1.15 Final    Thrombin Time 10/05/2023 17.0  13.0 - 19.0 Seconds Final    PTT Ratio 10/05/2023 0.95  <1.21 Final    DRVVT Screen Ratio 10/05/2023 0.86  <1.08 Final    Lupus Result 10/05/2023 Negative  Negative Final    Lupus Interpretation 10/05/2023    Final                    Value:Results    The INR is normal.  APTT ratio is normal.    DRVVT Screen ratio is normal.  Thrombin time is normal.  NEGATIVE TEST; A LUPUS ANTICOAGULANT WAS NOT DETECTED IN THIS SPECIMEN WITHIN THE LIMITS OF THE TESTING REPERTOIRE.  If the clinical picture is strongly suggestive of an antiphospholipid syndrome, recommend anticardiolipin and beta-2-glycoprotein (IgG and IgM) antibody tests.      Tyron Gutierrez MD  [facility]            TSH 10/05/2023 1.35  0.30 - 4.20 uIU/mL Final    Color Urine 10/05/2023 Yellow  Colorless, Straw, Light Yellow, Yellow Final    Appearance Urine 10/05/2023 Clear  Clear Final    Glucose Urine 10/05/2023 Negative  Negative mg/dL Final    Bilirubin Urine 10/05/2023 Negative  Negative Final    Ketones Urine 10/05/2023 Negative  Negative mg/dL Final    Specific Gravity Urine 10/05/2023 <=1.005  1.005 - 1.030 Final    Blood Urine 10/05/2023 Negative  Negative Final    pH Urine 10/05/2023 5.0  5.0 - 8.0 Final    Protein Albumin Urine 10/05/2023 Negative  Negative mg/dL Final    Urobilinogen Urine 10/05/2023 0.2  0.2, 1.0 E.U./dL Final    Nitrite Urine 10/05/2023 Negative  Negative Final    Leukocyte Esterase Urine 10/05/2023 Negative  Negative Final    Sodium 10/05/2023 141  135 - 145 mmol/L Final    Reference intervals for this test were updated on 09/26/2023 to more accurately reflect our healthy population. There may be differences in the flagging of prior results with similar values performed with this method. Interpretation of those prior results can be made in the context of the updated reference intervals.     Potassium 10/05/2023 4.4   3.4 - 5.3 mmol/L Final    Carbon Dioxide (CO2) 10/05/2023 24  22 - 29 mmol/L Final    Anion Gap 10/05/2023 10  7 - 15 mmol/L Final    Urea Nitrogen 10/05/2023 6.7  6.0 - 20.0 mg/dL Final    Creatinine 10/05/2023 0.79  0.51 - 0.95 mg/dL Final    GFR Estimate 10/05/2023 >90  >60 mL/min/1.73m2 Final    Calcium 10/05/2023 9.6  8.6 - 10.0 mg/dL Final    Chloride 10/05/2023 107  98 - 107 mmol/L Final    Glucose 10/05/2023 94  70 - 99 mg/dL Final    Alkaline Phosphatase 10/05/2023 67  35 - 104 U/L Final    AST 10/05/2023 21  0 - 45 U/L Final    Reference intervals for this test were updated on 6/12/2023 to more accurately reflect our healthy population. There may be differences in the flagging of prior results with similar values performed with this method. Interpretation of those prior results can be made in the context of the updated reference intervals.    ALT 10/05/2023 17  0 - 50 U/L Final    Reference intervals for this test were updated on 6/12/2023 to more accurately reflect our healthy population. There may be differences in the flagging of prior results with similar values performed with this method. Interpretation of those prior results can be made in the context of the updated reference intervals.      Protein Total 10/05/2023 8.3  6.4 - 8.3 g/dL Final    Albumin 10/05/2023 4.5  3.5 - 5.2 g/dL Final    Bilirubin Total 10/05/2023 0.4  <=1.2 mg/dL Final    Vitamin B12 10/05/2023 526  232 - 1,245 pg/mL Final    Vitamin B6 10/05/2023 88.5  20.0 - 125.0 nmol/L Final    INTERPRETIVE INFORMATION: Vitamin B6 (Pyridoxal 5-Phosphate)    Pyridoxal 5'-phosphate measured in a specimen collected   following an 8-hour or overnight fast accurately indicates   vitamin B6 nutritional status. Non-fasting specimen   concentration reflects recent vitamin intake.    This test was developed and its performance characteristics   determined by [facility]. It has not been cleared or   approved by the US Food and Drug  Administration. This test   was performed in a CLIA certified laboratory and is   intended for clinical purposes.  Performed By: Dheere Bolo  [address] [place] [address]  : Rd Bautista MD, PhD  CLIA Number: [id number]    Ferritin 10/05/2023 123  6 - 175 ng/mL Final    Magnesium 10/05/2023 2.1  1.7 - 2.3 mg/dL Final    Vitamin D, Total (25-Hydroxy) 10/05/2023 38  20 - 50 ng/mL Final    optimum levels    WBC Count 10/05/2023 3.2 (L)  4.0 - 11.0 10e3/uL Final    RBC Count 10/05/2023 4.40  3.80 - 5.20 10e6/uL Final    Hemoglobin 10/05/2023 13.7  11.7 - 15.7 g/dL Final    Hematocrit 10/05/2023 39.3  35.0 - 47.0 % Final    MCV 10/05/2023 89  78 - 100 fL Final    MCH 10/05/2023 31.1  26.5 - 33.0 pg Final    MCHC 10/05/2023 34.9  31.5 - 36.5 g/dL Final    RDW 10/05/2023 12.2  10.0 - 15.0 % Final    Platelet Count 10/05/2023 154  150 - 450 10e3/uL Final    % Neutrophils 10/05/2023 45  % Final    % Lymphocytes 10/05/2023 44  % Final    % Monocytes 10/05/2023 10  % Final    % Eosinophils 10/05/2023 1  % Final    % Basophils 10/05/2023 1  % Final    % Immature Granulocytes 10/05/2023 0  % Final    Absolute Neutrophils 10/05/2023 1.5 (L)  1.6 - 8.3 10e3/uL Final    Absolute Lymphocytes 10/05/2023 1.4  0.8 - 5.3 10e3/uL Final    Absolute Monocytes 10/05/2023 0.3  0.0 - 1.3 10e3/uL Final    Absolute Eosinophils 10/05/2023 0.0  0.0 - 0.7 10e3/uL Final    Absolute Basophils 10/05/2023 0.0  0.0 - 0.2 10e3/uL Final    Absolute Immature Granulocytes 10/05/2023 0.0  <=0.4 10e3/uL Final         Video-Visit Details    Type of service:  Video Visit     Originating Location (pt. Location): Home    Distant Location (provider location):  On-site  Platform used for Video Visit: The Online 401    Time start: 5:06 pm  Time End: 5:29pm  Signed Electronically by: Nadya Villalpando NP    Current Outpatient Medications:     acetaminophen (TYLENOL) 500 MG tablet, Take 1,000 mg by mouth, Disp: , Rfl:      albuterol (PROAIR HFA/PROVENTIL HFA/VENTOLIN HFA) 108 (90 Base) MCG/ACT inhaler, Inhale 2 puffs into the lungs every 6 hours as needed for shortness of breath, wheezing or cough, Disp: 18 g, Rfl: 0    albuterol (PROAIR HFA/PROVENTIL HFA/VENTOLIN HFA) 108 (90 Base) MCG/ACT inhaler, Inhale 2 puffs into the lungs every 6 hours as needed for shortness of breath / dyspnea or wheezing, Disp: 18 g, Rfl: 4    amphetamine-dextroamphetamine (ADDERALL XR) 20 MG 24 hr capsule, Take 1 capsule (20 mg) by mouth daily, Disp: 30 capsule, Rfl: 0    amphetamine-dextroamphetamine (ADDERALL) 10 MG tablet, Take 1 tablet (10 mg) by mouth daily, Disp: 30 tablet, Rfl: 0    fluconazole (DIFLUCAN) 150 MG tablet, Take 1 tablet (150 mg) by mouth every 3 days for 3 doses, Disp: 3 tablet, Rfl: 0    fluticasone (FLONASE) 50 MCG/ACT spray, Spray 1 spray into both nostrils daily, Disp: , Rfl:     gabapentin (NEURONTIN) 300 MG capsule, Take 1 capsule (300 mg) by mouth 2 times daily, Disp: 90 capsule, Rfl: 1    levothyroxine (SYNTHROID/LEVOTHROID) 88 MCG tablet, Take 1 tablet (88 mcg) by mouth daily before breakfast, Disp: 90 tablet, Rfl: 3    Multiple Vitamin (MULTI VITAMIN DAILY PO), , Disp: , Rfl:     naltrexone (DEPADE/REVIA) 50 MG tablet, Take 50 mg by mouth, Disp: , Rfl:     nystatin (MYCOSTATIN) 579724 UNIT/GM external cream, Apply topically 2 times daily, Disp: 30 g, Rfl: 0

## 2024-02-10 ENCOUNTER — HEALTH MAINTENANCE LETTER (OUTPATIENT)
Age: 39
End: 2024-02-10

## 2024-02-19 ENCOUNTER — MYC REFILL (OUTPATIENT)
Dept: INTERNAL MEDICINE | Facility: CLINIC | Age: 39
End: 2024-02-19
Payer: COMMERCIAL

## 2024-02-19 DIAGNOSIS — R41.840 DIFFICULTY CONCENTRATING: ICD-10-CM

## 2024-02-20 RX ORDER — DEXTROAMPHETAMINE SACCHARATE, AMPHETAMINE ASPARTATE, DEXTROAMPHETAMINE SULFATE AND AMPHETAMINE SULFATE 2.5; 2.5; 2.5; 2.5 MG/1; MG/1; MG/1; MG/1
10 TABLET ORAL DAILY
Qty: 30 TABLET | Refills: 0 | Status: SHIPPED | OUTPATIENT
Start: 2024-02-20 | End: 2024-04-17

## 2024-02-20 RX ORDER — DEXTROAMPHETAMINE SACCHARATE, AMPHETAMINE ASPARTATE MONOHYDRATE, DEXTROAMPHETAMINE SULFATE AND AMPHETAMINE SULFATE 5; 5; 5; 5 MG/1; MG/1; MG/1; MG/1
20 CAPSULE, EXTENDED RELEASE ORAL DAILY
Qty: 30 CAPSULE | Refills: 0 | Status: SHIPPED | OUTPATIENT
Start: 2024-02-20 | End: 2024-03-17

## 2024-03-17 ENCOUNTER — MYC REFILL (OUTPATIENT)
Dept: INTERNAL MEDICINE | Facility: CLINIC | Age: 39
End: 2024-03-17
Payer: COMMERCIAL

## 2024-03-17 DIAGNOSIS — R41.840 DIFFICULTY CONCENTRATING: ICD-10-CM

## 2024-03-18 RX ORDER — DEXTROAMPHETAMINE SACCHARATE, AMPHETAMINE ASPARTATE MONOHYDRATE, DEXTROAMPHETAMINE SULFATE AND AMPHETAMINE SULFATE 5; 5; 5; 5 MG/1; MG/1; MG/1; MG/1
20 CAPSULE, EXTENDED RELEASE ORAL DAILY
Qty: 30 CAPSULE | Refills: 0 | Status: SHIPPED | OUTPATIENT
Start: 2024-03-18 | End: 2024-04-17

## 2024-04-01 NOTE — TELEPHONE ENCOUNTER
"  Last office visit provider:  10/13/21     Requested Prescriptions   Pending Prescriptions Disp Refills     KELLY 0.35 MG tablet [Pharmacy Med Name: Kelly Oral Tablet 0.35 MG] 84 tablet 0     Sig: Take 1 tablet (0.35 mg total) by mouth daily.       Contraceptives Protocol Passed - 11/19/2021  3:21 PM        Passed - Patient is not a current smoker if age is 35 or older        Passed - Recent (12 mo) or future (30 days) visit within the authorizing provider's specialty     Patient has had an office visit with the authorizing provider or a provider within the authorizing providers department within the previous 12 mos or has a future within next 30 days. See \"Patient Info\" tab in inbasket, or \"Choose Columns\" in Meds & Orders section of the refill encounter.              Passed - Medication is active on med list        Passed - No active pregnancy on record        Passed - No positive pregnancy test in past 12 months             albania peter RN 11/20/21 4:19 PM  "
Patient calling to give update on the below medication request.  Stating she is out of medication as of today.  Requesting that this be expedited if possible.  
Negative

## 2024-04-11 ENCOUNTER — HOSPITAL ENCOUNTER (OUTPATIENT)
Dept: GENERAL RADIOLOGY | Facility: HOSPITAL | Age: 39
Discharge: HOME | End: 2024-04-11
Payer: COMMERCIAL

## 2024-04-11 ENCOUNTER — MYC MEDICAL ADVICE (OUTPATIENT)
Dept: FAMILY MEDICINE | Facility: CLINIC | Age: 39
End: 2024-04-11

## 2024-04-11 ENCOUNTER — OFFICE VISIT (OUTPATIENT)
Dept: FAMILY MEDICINE | Facility: CLINIC | Age: 39
End: 2024-04-11
Payer: COMMERCIAL

## 2024-04-11 VITALS
OXYGEN SATURATION: 100 % | TEMPERATURE: 97.8 F | RESPIRATION RATE: 16 BRPM | SYSTOLIC BLOOD PRESSURE: 100 MMHG | WEIGHT: 167 LBS | HEIGHT: 67 IN | DIASTOLIC BLOOD PRESSURE: 62 MMHG | BODY MASS INDEX: 26.21 KG/M2 | HEART RATE: 75 BPM

## 2024-04-11 DIAGNOSIS — R53.83 OTHER FATIGUE: ICD-10-CM

## 2024-04-11 DIAGNOSIS — Z23 ENCOUNTER FOR IMMUNIZATION: ICD-10-CM

## 2024-04-11 DIAGNOSIS — R06.02 SOB (SHORTNESS OF BREATH): Primary | ICD-10-CM

## 2024-04-11 DIAGNOSIS — R53.81 MALAISE: ICD-10-CM

## 2024-04-11 DIAGNOSIS — R06.02 SOB (SHORTNESS OF BREATH): ICD-10-CM

## 2024-04-11 LAB
FLUAV RNA SPEC QL NAA+PROBE: NEGATIVE
FLUBV RNA RESP QL NAA+PROBE: NEGATIVE
RSV RNA SPEC NAA+PROBE: NEGATIVE
SARS-COV-2 RNA RESP QL NAA+PROBE: NEGATIVE

## 2024-04-11 PROCEDURE — 71046 X-RAY EXAM CHEST 2 VIEWS: CPT

## 2024-04-11 PROCEDURE — 87637 SARSCOV2&INF A&B&RSV AMP PRB: CPT

## 2024-04-11 PROCEDURE — 90471 IMMUNIZATION ADMIN: CPT

## 2024-04-11 PROCEDURE — 99214 OFFICE O/P EST MOD 30 MIN: CPT | Mod: 25

## 2024-04-11 PROCEDURE — 90743 HEPB VACC 2 DOSE ADOLESC IM: CPT

## 2024-04-11 RX ORDER — PILOCARPINE HYDROCHLORIDE 5 MG/1
1 TABLET, FILM COATED ORAL
COMMUNITY
Start: 2024-02-09

## 2024-04-11 RX ORDER — CYCLOSPORINE 0.5 MG/ML
1 EMULSION OPHTHALMIC
COMMUNITY
Start: 2024-04-01

## 2024-04-11 RX ORDER — HYDROXYCHLOROQUINE SULFATE 200 MG/1
1 TABLET, FILM COATED ORAL DAILY
COMMUNITY
Start: 2024-01-12 | End: 2025-01-11

## 2024-04-11 ASSESSMENT — ENCOUNTER SYMPTOMS
SHORTNESS OF BREATH: 1
FATIGUE: 1
HEADACHES: 1
WHEEZING: 1
PALPITATIONS: 0
COUGH: 0
NAUSEA: 1

## 2024-04-11 ASSESSMENT — PATIENT HEALTH QUESTIONNAIRE - PHQ9
10. IF YOU CHECKED OFF ANY PROBLEMS, HOW DIFFICULT HAVE THESE PROBLEMS MADE IT FOR YOU TO DO YOUR WORK, TAKE CARE OF THINGS AT HOME, OR GET ALONG WITH OTHER PEOPLE: NOT DIFFICULT AT ALL
SUM OF ALL RESPONSES TO PHQ QUESTIONS 1-9: 2
SUM OF ALL RESPONSES TO PHQ QUESTIONS 1-9: 2

## 2024-04-11 ASSESSMENT — PAIN SCALES - GENERAL: PAINLEVEL: NO PAIN (0)

## 2024-04-11 NOTE — PROGRESS NOTES
Assessment & Plan     SOB (shortness of breath)  Other fatigue  Malaise  Onset of malaise, fatigue, and SOB a few days ago. Physical exam does not reveal atypical lung sounds, or abnormalities on HEENT exam. Will get chest x-ray at this time due to acute onset of SALDAÑA/SOB. Negative chest x-ray for exudates or infiltrates. I suspect this is a viral illness, does report sinus headache, but discussed waiting till closer to 7-10 days for antibiotics. Patient has never had blood clot, not on hormonal birth controls, does not smoke. I think since there is generalized malaise and fatigue with symptoms PE is less likely. There is chest pain with breathing, does not happen at maximal inspiration, but persistent. Check for COVID, Flu, and RSV today to provide reassurance if positive. Negative viral swab. Patient would like to complete d-dimer to ensure no PE. Due to SOB/SALDAÑA and chest pain with inspirations I think this is valid. Patient also had high factor VIII and Von Willebrand factor, but has not yet seen hematology. Patient will come in tomorrow for D-dimer. Did notify her that if positive will have to have urgent imaging and that d-dimer can be positive without blood clot.   - XR Chest 2 Views; Future  - Symptomatic Influenza A/B, RSV, & SARS-CoV2 PCR (COVID-19) Nose    Encounter for immunization  Due for 3rd dose, given at the end of the visit today.   - HEPATITIS B ADOLESCENT 2-DOSE (ENGERIX-B/RECOMBIVAX HB)    Subjective   Enedelia is a 38 year old, presenting for the following health issues:  Fatigue (Fatigue, shortness of breath body aches for the last 4 days.  )      4/11/2024    10:27 AM   Additional Questions   Roomed by gerson     Fatigue, shortness of breath and body aches all came on together. Does have allergies, so thought it was this prior to, but she felt like she had to pass out at due to the SOB. She can not take deep breathes because of pressure and pain in her chest. It is across the whole chest. Also feels  like a sharp pain at times. Can not catch breath even when trying to take small, controlled, breaths. She only is coughing when trying to take a deep breath, not productive. Occasional wheezing. When trying to clear the throat, feels rattling. Nausea and upset stomach.     Does note she has chronic headaches, so there might be some sinus headache. Hard to tell the difference because of chronic symptoms.     Was given albuterol in the past for wheezing. Typically wheezing and coughing is with activity and laughing.     History of Present Illness       Reason for visit:  I want to make sure I do not have pneumonia or other sicknesses that my coworkers have. I have been presenting similar symptoms to theirs.  Symptom onset:  1-3 days ago  Symptoms include:  Feeling worn down, lethargic, dizzy and having difficulty taking breaths. When I try to take a deep breath it feels full to the point of restricted and I feel like I'm going to pass out.  Symptom intensity:  Moderate  Symptom progression:  Worsening  Had these symptoms before:  No  What makes it worse:  Being cold or shivering, using stairs, carrying any additional weight like a bag and moving too fast.  What makes it better:  Nothing that I've tried so far not even a hot shower    She eats 2-3 servings of fruits and vegetables daily.She consumes 0 sweetened beverage(s) daily.She exercises with enough effort to increase her heart rate 10 to 19 minutes per day.  She exercises with enough effort to increase her heart rate 3 or less days per week.   She is taking medications regularly.     Review of Systems   Constitutional:  Positive for fatigue.   Respiratory:  Positive for shortness of breath and wheezing. Negative for cough.    Cardiovascular:  Positive for chest pain. Negative for palpitations.   Gastrointestinal:  Positive for nausea.   Neurological:  Positive for headaches.         Objective    /62 (BP Location: Right arm, Patient Position: Sitting)    "Pulse 75   Temp 97.8  F (36.6  C) (Oral)   Resp 16   Ht 1.69 m (5' 6.54\")   Wt 75.8 kg (167 lb)   LMP  (LMP Unknown)   SpO2 100%   BMI 26.52 kg/m    Body mass index is 26.52 kg/m .  Physical Exam  Constitutional:       General: She is not in acute distress.  HENT:      Head: Normocephalic.      Right Ear: Tympanic membrane, ear canal and external ear normal.      Left Ear: Tympanic membrane, ear canal and external ear normal.      Mouth/Throat:      Pharynx: No oropharyngeal exudate or posterior oropharyngeal erythema.   Eyes:      Extraocular Movements: Extraocular movements intact.      Conjunctiva/sclera: Conjunctivae normal.      Pupils: Pupils are equal, round, and reactive to light.   Cardiovascular:      Rate and Rhythm: Normal rate and regular rhythm.      Heart sounds: Normal heart sounds.   Pulmonary:      Effort: No respiratory distress.      Breath sounds: Normal breath sounds. No wheezing.   Lymphadenopathy:      Cervical: No cervical adenopathy.   Skin:     General: Skin is warm and dry.   Neurological:      General: No focal deficit present.      Mental Status: She is alert.   Psychiatric:         Mood and Affect: Mood normal.         Thought Content: Thought content normal.        At the end of the visit, I confirmed understanding of what was discussed. Katiuska has no further questions or concerns that were brought up at this time.     Gerardo Kumar DNP, APRN, FNP-C          "

## 2024-04-12 ENCOUNTER — LAB (OUTPATIENT)
Dept: LAB | Facility: CLINIC | Age: 39
End: 2024-04-12
Payer: COMMERCIAL

## 2024-04-12 DIAGNOSIS — R06.02 SOB (SHORTNESS OF BREATH): ICD-10-CM

## 2024-04-12 LAB — D DIMER PPP FEU-MCNC: 0.41 UG/ML FEU (ref 0–0.5)

## 2024-04-12 PROCEDURE — 36415 COLL VENOUS BLD VENIPUNCTURE: CPT

## 2024-04-12 PROCEDURE — 85379 FIBRIN DEGRADATION QUANT: CPT

## 2024-04-14 ENCOUNTER — MYC MEDICAL ADVICE (OUTPATIENT)
Dept: FAMILY MEDICINE | Facility: CLINIC | Age: 39
End: 2024-04-14
Payer: COMMERCIAL

## 2024-04-14 DIAGNOSIS — R06.02 SOB (SHORTNESS OF BREATH): Primary | ICD-10-CM

## 2024-04-17 ENCOUNTER — MYC REFILL (OUTPATIENT)
Dept: INTERNAL MEDICINE | Facility: CLINIC | Age: 39
End: 2024-04-17
Payer: COMMERCIAL

## 2024-04-17 DIAGNOSIS — R41.840 DIFFICULTY CONCENTRATING: ICD-10-CM

## 2024-04-17 DIAGNOSIS — R06.2 WHEEZE: ICD-10-CM

## 2024-04-17 RX ORDER — DEXTROAMPHETAMINE SACCHARATE, AMPHETAMINE ASPARTATE MONOHYDRATE, DEXTROAMPHETAMINE SULFATE AND AMPHETAMINE SULFATE 5; 5; 5; 5 MG/1; MG/1; MG/1; MG/1
20 CAPSULE, EXTENDED RELEASE ORAL DAILY
Qty: 30 CAPSULE | Refills: 0 | Status: SHIPPED | OUTPATIENT
Start: 2024-04-17 | End: 2024-05-18

## 2024-04-17 RX ORDER — ALBUTEROL SULFATE 90 UG/1
2 AEROSOL, METERED RESPIRATORY (INHALATION) EVERY 6 HOURS PRN
Qty: 18 G | Refills: 0 | Status: SHIPPED | OUTPATIENT
Start: 2024-04-17

## 2024-04-17 RX ORDER — DEXTROAMPHETAMINE SACCHARATE, AMPHETAMINE ASPARTATE, DEXTROAMPHETAMINE SULFATE AND AMPHETAMINE SULFATE 2.5; 2.5; 2.5; 2.5 MG/1; MG/1; MG/1; MG/1
10 TABLET ORAL DAILY
Qty: 30 TABLET | Refills: 0 | Status: SHIPPED | OUTPATIENT
Start: 2024-04-17 | End: 2024-05-18

## 2024-04-30 DIAGNOSIS — E03.9 HYPOTHYROIDISM, UNSPECIFIED TYPE: ICD-10-CM

## 2024-04-30 RX ORDER — LEVOTHYROXINE SODIUM 88 UG/1
88 TABLET ORAL
Qty: 90 TABLET | Refills: 3 | Status: SHIPPED | OUTPATIENT
Start: 2024-04-30 | End: 2024-05-02

## 2024-05-02 RX ORDER — LEVOTHYROXINE SODIUM 88 UG/1
88 TABLET ORAL
Qty: 90 TABLET | Refills: 3 | Status: SHIPPED | OUTPATIENT
Start: 2024-05-02

## 2024-05-02 NOTE — TELEPHONE ENCOUNTER
Please resend levothyroxine to [facility] in [place],     [address]

## 2024-05-18 ENCOUNTER — MYC REFILL (OUTPATIENT)
Dept: INTERNAL MEDICINE | Facility: CLINIC | Age: 39
End: 2024-05-18
Payer: COMMERCIAL

## 2024-05-18 DIAGNOSIS — R41.840 DIFFICULTY CONCENTRATING: ICD-10-CM

## 2024-05-21 RX ORDER — DEXTROAMPHETAMINE SACCHARATE, AMPHETAMINE ASPARTATE MONOHYDRATE, DEXTROAMPHETAMINE SULFATE AND AMPHETAMINE SULFATE 5; 5; 5; 5 MG/1; MG/1; MG/1; MG/1
20 CAPSULE, EXTENDED RELEASE ORAL DAILY
Qty: 30 CAPSULE | Refills: 0 | Status: SHIPPED | OUTPATIENT
Start: 2024-05-21 | End: 2024-06-19

## 2024-05-21 RX ORDER — DEXTROAMPHETAMINE SACCHARATE, AMPHETAMINE ASPARTATE, DEXTROAMPHETAMINE SULFATE AND AMPHETAMINE SULFATE 2.5; 2.5; 2.5; 2.5 MG/1; MG/1; MG/1; MG/1
10 TABLET ORAL DAILY
Qty: 30 TABLET | Refills: 0 | Status: SHIPPED | OUTPATIENT
Start: 2024-05-21 | End: 2024-06-19

## 2024-06-19 ENCOUNTER — MYC REFILL (OUTPATIENT)
Dept: INTERNAL MEDICINE | Facility: CLINIC | Age: 39
End: 2024-06-19
Payer: COMMERCIAL

## 2024-06-19 DIAGNOSIS — R41.840 DIFFICULTY CONCENTRATING: ICD-10-CM

## 2024-06-19 RX ORDER — DEXTROAMPHETAMINE SACCHARATE, AMPHETAMINE ASPARTATE, DEXTROAMPHETAMINE SULFATE AND AMPHETAMINE SULFATE 2.5; 2.5; 2.5; 2.5 MG/1; MG/1; MG/1; MG/1
10 TABLET ORAL DAILY
Qty: 30 TABLET | Refills: 0 | Status: SHIPPED | OUTPATIENT
Start: 2024-06-19 | End: 2024-09-17

## 2024-06-19 RX ORDER — DEXTROAMPHETAMINE SACCHARATE, AMPHETAMINE ASPARTATE MONOHYDRATE, DEXTROAMPHETAMINE SULFATE AND AMPHETAMINE SULFATE 5; 5; 5; 5 MG/1; MG/1; MG/1; MG/1
20 CAPSULE, EXTENDED RELEASE ORAL DAILY
Qty: 30 CAPSULE | Refills: 0 | Status: SHIPPED | OUTPATIENT
Start: 2024-06-19 | End: 2024-07-17

## 2024-07-17 ENCOUNTER — MYC REFILL (OUTPATIENT)
Dept: INTERNAL MEDICINE | Facility: CLINIC | Age: 39
End: 2024-07-17
Payer: COMMERCIAL

## 2024-07-17 DIAGNOSIS — R41.840 DIFFICULTY CONCENTRATING: ICD-10-CM

## 2024-07-17 RX ORDER — DEXTROAMPHETAMINE SACCHARATE, AMPHETAMINE ASPARTATE MONOHYDRATE, DEXTROAMPHETAMINE SULFATE AND AMPHETAMINE SULFATE 5; 5; 5; 5 MG/1; MG/1; MG/1; MG/1
20 CAPSULE, EXTENDED RELEASE ORAL DAILY
Qty: 30 CAPSULE | Refills: 0 | Status: SHIPPED | OUTPATIENT
Start: 2024-07-17 | End: 2024-08-17

## 2024-08-17 ENCOUNTER — MYC REFILL (OUTPATIENT)
Dept: INTERNAL MEDICINE | Facility: CLINIC | Age: 39
End: 2024-08-17
Payer: COMMERCIAL

## 2024-08-17 DIAGNOSIS — R41.840 DIFFICULTY CONCENTRATING: ICD-10-CM

## 2024-08-19 RX ORDER — DEXTROAMPHETAMINE SACCHARATE, AMPHETAMINE ASPARTATE MONOHYDRATE, DEXTROAMPHETAMINE SULFATE AND AMPHETAMINE SULFATE 5; 5; 5; 5 MG/1; MG/1; MG/1; MG/1
20 CAPSULE, EXTENDED RELEASE ORAL DAILY
Qty: 30 CAPSULE | Refills: 0 | Status: SHIPPED | OUTPATIENT
Start: 2024-08-19 | End: 2024-09-17

## 2024-08-22 NOTE — PATIENT INSTRUCTIONS
Given persistent symptoms will obtain EMG test.  Continue Occupational Therapy.    Plan:  - Today's Plan of Care:  Brace as needed  Referral for left upper extremity EMG    Discussed activity considerations and other supportive care including Ice/Heat, OTC and other topical medications as needed.    -We also discussed other future treatment options:  Referral to Hand Surgery  Consideration of US guided carpal tunnel injections    Follow Up: send us a MyChart to review EMG results    If you have any further questions for your physician or physician s care team you can call [phone] and use option 3 to leave a voice message.   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

## 2024-09-17 ENCOUNTER — MYC REFILL (OUTPATIENT)
Dept: INTERNAL MEDICINE | Facility: CLINIC | Age: 39
End: 2024-09-17
Payer: COMMERCIAL

## 2024-09-17 DIAGNOSIS — R41.840 DIFFICULTY CONCENTRATING: ICD-10-CM

## 2024-09-17 RX ORDER — DEXTROAMPHETAMINE SACCHARATE, AMPHETAMINE ASPARTATE, DEXTROAMPHETAMINE SULFATE AND AMPHETAMINE SULFATE 2.5; 2.5; 2.5; 2.5 MG/1; MG/1; MG/1; MG/1
10 TABLET ORAL DAILY
Qty: 30 TABLET | Refills: 0 | Status: SHIPPED | OUTPATIENT
Start: 2024-09-17

## 2024-09-17 RX ORDER — DEXTROAMPHETAMINE SACCHARATE, AMPHETAMINE ASPARTATE MONOHYDRATE, DEXTROAMPHETAMINE SULFATE AND AMPHETAMINE SULFATE 5; 5; 5; 5 MG/1; MG/1; MG/1; MG/1
20 CAPSULE, EXTENDED RELEASE ORAL DAILY
Qty: 30 CAPSULE | Refills: 0 | Status: SHIPPED | OUTPATIENT
Start: 2024-09-17

## 2024-10-15 ENCOUNTER — MYC REFILL (OUTPATIENT)
Dept: INTERNAL MEDICINE | Facility: CLINIC | Age: 39
End: 2024-10-15
Payer: COMMERCIAL

## 2024-10-15 DIAGNOSIS — R41.840 DIFFICULTY CONCENTRATING: ICD-10-CM

## 2024-10-15 RX ORDER — DEXTROAMPHETAMINE SACCHARATE, AMPHETAMINE ASPARTATE MONOHYDRATE, DEXTROAMPHETAMINE SULFATE AND AMPHETAMINE SULFATE 5; 5; 5; 5 MG/1; MG/1; MG/1; MG/1
20 CAPSULE, EXTENDED RELEASE ORAL DAILY
Qty: 30 CAPSULE | Refills: 0 | Status: SHIPPED | OUTPATIENT
Start: 2024-10-15 | End: 2024-11-11

## 2024-10-15 RX ORDER — DEXTROAMPHETAMINE SACCHARATE, AMPHETAMINE ASPARTATE, DEXTROAMPHETAMINE SULFATE AND AMPHETAMINE SULFATE 2.5; 2.5; 2.5; 2.5 MG/1; MG/1; MG/1; MG/1
10 TABLET ORAL DAILY
Qty: 30 TABLET | Refills: 0 | Status: SHIPPED | OUTPATIENT
Start: 2024-10-15 | End: 2024-11-11

## 2024-11-11 ENCOUNTER — MYC REFILL (OUTPATIENT)
Dept: INTERNAL MEDICINE | Facility: CLINIC | Age: 39
End: 2024-11-11
Payer: COMMERCIAL

## 2024-11-11 DIAGNOSIS — R41.840 DIFFICULTY CONCENTRATING: ICD-10-CM

## 2024-11-11 RX ORDER — DEXTROAMPHETAMINE SACCHARATE, AMPHETAMINE ASPARTATE MONOHYDRATE, DEXTROAMPHETAMINE SULFATE AND AMPHETAMINE SULFATE 5; 5; 5; 5 MG/1; MG/1; MG/1; MG/1
20 CAPSULE, EXTENDED RELEASE ORAL DAILY
Qty: 30 CAPSULE | Refills: 0 | Status: SHIPPED | OUTPATIENT
Start: 2024-11-11

## 2024-11-11 RX ORDER — DEXTROAMPHETAMINE SACCHARATE, AMPHETAMINE ASPARTATE, DEXTROAMPHETAMINE SULFATE AND AMPHETAMINE SULFATE 2.5; 2.5; 2.5; 2.5 MG/1; MG/1; MG/1; MG/1
10 TABLET ORAL DAILY
Qty: 30 TABLET | Refills: 0 | Status: SHIPPED | OUTPATIENT
Start: 2024-11-11

## 2024-11-12 ENCOUNTER — TELEPHONE (OUTPATIENT)
Dept: INTERNAL MEDICINE | Facility: CLINIC | Age: 39
End: 2024-11-12
Payer: COMMERCIAL

## 2024-11-12 NOTE — TELEPHONE ENCOUNTER
Reason for Call:  Appointment Request    Patient requesting this type of appt:  Preventive     Requested provider: Nadya Villalpando    Reason patient unable to be scheduled:  Patient want to get in this year for annual wellness.    When does patient want to be seen/preferred time:  Within 2024    Comments: Patient want to see if she can get in for a physical this year instead of waiting for 2025.    Could we send this information to you in [employer] or would you prefer to receive a phone call?:   Patient would prefer a phone call   Okay to leave a detailed message?: Yes at Cell number on file:    Telephone Information:   Mobile [phone]       Call taken on 11/12/2024 at 8:48 AM by Miguel Yepez

## 2024-11-12 NOTE — TELEPHONE ENCOUNTER
Left message for patient to call back. Please inform patient that physicals book our a few months and Nadya Villalpando is not in clinic that many days a week. Please assist in scheduling first available and offer to be added to wait list.

## 2024-12-01 SDOH — HEALTH STABILITY: PHYSICAL HEALTH: ON AVERAGE, HOW MANY DAYS PER WEEK DO YOU ENGAGE IN MODERATE TO STRENUOUS EXERCISE (LIKE A BRISK WALK)?: 2 DAYS

## 2024-12-01 SDOH — HEALTH STABILITY: PHYSICAL HEALTH: ON AVERAGE, HOW MANY MINUTES DO YOU ENGAGE IN EXERCISE AT THIS LEVEL?: 40 MIN

## 2024-12-01 ASSESSMENT — SOCIAL DETERMINANTS OF HEALTH (SDOH): HOW OFTEN DO YOU GET TOGETHER WITH FRIENDS OR RELATIVES?: NEVER

## 2024-12-05 ENCOUNTER — OFFICE VISIT (OUTPATIENT)
Dept: INTERNAL MEDICINE | Facility: CLINIC | Age: 39
End: 2024-12-05
Payer: COMMERCIAL

## 2024-12-05 VITALS
RESPIRATION RATE: 16 BRPM | OXYGEN SATURATION: 100 % | HEIGHT: 65 IN | WEIGHT: 170 LBS | HEART RATE: 83 BPM | DIASTOLIC BLOOD PRESSURE: 70 MMHG | BODY MASS INDEX: 28.32 KG/M2 | SYSTOLIC BLOOD PRESSURE: 114 MMHG | TEMPERATURE: 97.5 F

## 2024-12-05 DIAGNOSIS — Z11.3 SCREENING EXAMINATION FOR STI: ICD-10-CM

## 2024-12-05 DIAGNOSIS — Z23 INFLUENZA VACCINE NEEDED: ICD-10-CM

## 2024-12-05 DIAGNOSIS — Z12.31 VISIT FOR SCREENING MAMMOGRAM: ICD-10-CM

## 2024-12-05 DIAGNOSIS — E03.9 HYPOTHYROIDISM, UNSPECIFIED TYPE: ICD-10-CM

## 2024-12-05 DIAGNOSIS — R41.840 DIFFICULTY CONCENTRATING: ICD-10-CM

## 2024-12-05 DIAGNOSIS — D72.819 LEUKOPENIA, UNSPECIFIED TYPE: ICD-10-CM

## 2024-12-05 DIAGNOSIS — Z23 NEED FOR HPV VACCINATION: ICD-10-CM

## 2024-12-05 DIAGNOSIS — Z23 COVID-19 VACCINE ADMINISTERED: ICD-10-CM

## 2024-12-05 DIAGNOSIS — Z12.4 CERVICAL CANCER SCREENING: ICD-10-CM

## 2024-12-05 DIAGNOSIS — Z00.00 ANNUAL PHYSICAL EXAM: Primary | ICD-10-CM

## 2024-12-05 DIAGNOSIS — Z13.220 LIPID SCREENING: ICD-10-CM

## 2024-12-05 LAB
BASOPHILS # BLD AUTO: 0 10E3/UL (ref 0–0.2)
BASOPHILS NFR BLD AUTO: 1 %
CHOLEST SERPL-MCNC: 144 MG/DL
EOSINOPHIL # BLD AUTO: 0 10E3/UL (ref 0–0.7)
EOSINOPHIL NFR BLD AUTO: 0 %
ERYTHROCYTE [DISTWIDTH] IN BLOOD BY AUTOMATED COUNT: 11.6 % (ref 10–15)
FASTING STATUS PATIENT QL REPORTED: NORMAL
HAV IGM SERPL QL IA: NONREACTIVE
HBV CORE IGM SERPL QL IA: NONREACTIVE
HBV SURFACE AG SERPL QL IA: NONREACTIVE
HCT VFR BLD AUTO: 36.2 % (ref 35–47)
HCV AB SERPL QL IA: NONREACTIVE
HDLC SERPL-MCNC: 70 MG/DL
HGB BLD-MCNC: 12.5 G/DL (ref 11.7–15.7)
HIV 1+2 AB+HIV1 P24 AG SERPL QL IA: NONREACTIVE
IMM GRANULOCYTES # BLD: 0 10E3/UL
IMM GRANULOCYTES NFR BLD: 0 %
LDLC SERPL CALC-MCNC: 65 MG/DL
LYMPHOCYTES # BLD AUTO: 1.4 10E3/UL (ref 0.8–5.3)
LYMPHOCYTES NFR BLD AUTO: 43 %
MCH RBC QN AUTO: 30.6 PG (ref 26.5–33)
MCHC RBC AUTO-ENTMCNC: 34.5 G/DL (ref 31.5–36.5)
MCV RBC AUTO: 89 FL (ref 78–100)
MONOCYTES # BLD AUTO: 0.2 10E3/UL (ref 0–1.3)
MONOCYTES NFR BLD AUTO: 6 %
NEUTROPHILS # BLD AUTO: 1.6 10E3/UL (ref 1.6–8.3)
NEUTROPHILS NFR BLD AUTO: 49 %
NONHDLC SERPL-MCNC: 74 MG/DL
PLATELET # BLD AUTO: 153 10E3/UL (ref 150–450)
RBC # BLD AUTO: 4.08 10E6/UL (ref 3.8–5.2)
RETICS # AUTO: 0.03 10E6/UL (ref 0.03–0.1)
RETICS/RBC NFR AUTO: 0.7 % (ref 0.5–2)
T PALLIDUM AB SER QL: NONREACTIVE
T VAGINALIS DNA SPEC QL NAA+PROBE: NOT DETECTED
TRIGL SERPL-MCNC: 43 MG/DL
WBC # BLD AUTO: 3.2 10E3/UL (ref 4–11)

## 2024-12-05 RX ORDER — TRIAMCINOLONE ACETONIDE 1 MG/G
OINTMENT TOPICAL PRN
COMMUNITY
Start: 2024-05-14

## 2024-12-05 RX ORDER — LEVOTHYROXINE SODIUM 88 UG/1
88 TABLET ORAL DAILY
Qty: 90 TABLET | Refills: 3 | Status: SHIPPED | OUTPATIENT
Start: 2024-12-05

## 2024-12-05 RX ORDER — DEXTROAMPHETAMINE SACCHARATE, AMPHETAMINE ASPARTATE MONOHYDRATE, DEXTROAMPHETAMINE SULFATE AND AMPHETAMINE SULFATE 7.5; 7.5; 7.5; 7.5 MG/1; MG/1; MG/1; MG/1
30 CAPSULE, EXTENDED RELEASE ORAL DAILY
Qty: 30 CAPSULE | Refills: 0 | Status: SHIPPED | OUTPATIENT
Start: 2024-12-05

## 2024-12-05 ASSESSMENT — PATIENT HEALTH QUESTIONNAIRE - PHQ9
10. IF YOU CHECKED OFF ANY PROBLEMS, HOW DIFFICULT HAVE THESE PROBLEMS MADE IT FOR YOU TO DO YOUR WORK, TAKE CARE OF THINGS AT HOME, OR GET ALONG WITH OTHER PEOPLE: SOMEWHAT DIFFICULT
SUM OF ALL RESPONSES TO PHQ QUESTIONS 1-9: 11
SUM OF ALL RESPONSES TO PHQ QUESTIONS 1-9: 11

## 2024-12-05 NOTE — PROGRESS NOTES
"Preventive Care Visit  [facility] RANDY Villalpando NP,    Dec 5, 2024      Assessment & Plan   Problem List Items Addressed This Visit       Hypothyroidism (Chronic)    Relevant Medications    levothyroxine (SYNTHROID/LEVOTHROID) 88 MCG tablet     Other Visit Diagnoses       Annual physical exam    -  Primary    Visit for screening mammogram        Relevant Orders    MA Screening Bilateral w/ Denilson    Cervical cancer screening        Relevant Orders    HPV and Gynecologic Cytology Panel - Recommended Age 30 - 65 Years    Need for HPV vaccination        Relevant Orders    HPV and Gynecologic Cytology Panel - Recommended Age 30 - 65 Years    Influenza vaccine needed        COVID-19 vaccine administered        Screening examination for STI        Relevant Orders    Chlamydia trachomatis/Neisseria gonorrhoeae by PCR - lab collect    HIV Antigen Antibody Combo    Treponema Abs w Reflex to RPR and Titer    Trichomonas vaginalis DNA PCR    Acute hepatitis panel    Leukopenia, unspecified type        Relevant Orders    Lab Blood Morphology Pathologist Review    Lipid screening        Relevant Orders    Lipid Profile (Chol, Trig, HDL, LDL calc)    Difficulty concentrating        Relevant Medications    amphetamine-dextroamphetamine (ADDERALL XR) 30 MG 24 hr capsule            BMI  Estimated body mass index is 28.29 kg/m  as calculated from the following:    Height as of this encounter: 1.651 m (5' 5\").    Weight as of this encounter: 77.1 kg (170 lb).       Counseling  Appropriate preventive services were addressed with this patient via screening, questionnaire, or discussion as appropriate for fall prevention, nutrition, physical activity, Tobacco-use cessation, social engagement, weight loss and cognition.  Checklist reviewing preventive services available has been given to the patient.  Reviewed patient's diet, addressing concerns and/or questions.   She is at risk for lack of exercise and has been " provided with information to increase physical activity for the benefit of her well-being.   Patient is at risk for social isolation and has been provided with information about the benefit of social connection.   The patient was instructed to see the dentist every 6 months.   She is at risk for psychosocial distress and has been provided with information to reduce risk.   The patient's PHQ-9 score is consistent with moderate depression. She was provided with information regarding depression.           Rigo Mcdaniels is a 39 year old, presenting for the following:  Physical and Recheck Medication (Increase medication and question on thyroid meds )        12/5/2024     1:44 PM   Additional Questions   Roomed by Jeff Sharma   Accompanied by N/A          HPI          Health Care Directive  Patient does not have a Health Care Directive:       12/1/2024   General Health   How would you rate your overall physical health? (!) POOR   Feel stress (tense, anxious, or unable to sleep) Very much      (!) STRESS CONCERN      12/1/2024   Nutrition   Three or more servings of calcium each day? Yes   Diet: Gluten-free/reduced   How many servings of fruit and vegetables per day? (!) I DON'T KNOW   How many sweetened beverages each day? 0-1            12/1/2024   Exercise   Days per week of moderate/strenous exercise 2 days   Average minutes spent exercising at this level 40 min      (!) EXERCISE CONCERN      12/1/2024   Social Factors   Frequency of gathering with friends or relatives Never   Worry food won't last until get money to buy more Patient declined   Food not last or not have enough money for food? Patient declined   Do you have housing? (Housing is defined as stable permanent housing and does not include staying ouside in a car, in a tent, in an abandoned building, in an overnight shelter, or couch-surfing.) Yes   Are you worried about losing your housing? Patient declined   Lack of transportation? No   Unable to get  utilities (heat,electricity)? No      (!) SOCIAL CONNECTIONS CONCERN      12/1/2024   Dental   Dentist two times every year? (!) NO            12/1/2024   TB Screening   Were you born outside of the [place]? No          Today's PHQ-9 Score:       12/5/2024     8:13 AM   PHQ-9 SCORE   PHQ-9 Total Score MyChart 11 (Moderate depression)   PHQ-9 Total Score 11        Patient-reported         12/1/2024   Substance Use   Alcohol more than 3/day or more than 7/wk No   Do you use any other substances recreationally? No        Social History     Tobacco Use    Smoking status: Never     Passive exposure: Never    Smokeless tobacco: Never    Tobacco comments:     smokers in home   Vaping Use    Vaping status: Never Used   Substance Use Topics    Alcohol use: Yes     Comment: Alcoholic Drinks/day: rarely 4/year    Drug use: Never           9/14/2021   LAST FHS-7 RESULTS   1st degree relative breast or ovarian cancer No   Any relative bilateral breast cancer No   Any male have breast cancer No   Any ONE woman have BOTH breast AND ovarian cancer No   Any woman with breast cancer before 50yrs No   2 or more relatives with breast AND/OR ovarian cancer No   2 or more relatives with breast AND/OR bowel cancer No           Mammogram Screening - Mammogram every 1-2 years updated in Health Maintenance based on mutual decision making        12/1/2024   STI Screening   New sexual partner(s) since last STI/HIV test? (!) YES         History of abnormal Pap smear: YES - other categories - see link Cervical Cytology Screening Guidelines        Latest Ref Rng & Units 9/1/2021     8:55 AM 6/18/2016     2:42 PM 6/17/2016    12:00 AM   PAP / HPV   PAP  Negative for Intraepithelial Lesion or Malignancy (NILM)      PAP (Historical)    NIL    HPV 16 DNA Negative Negative  Negative     HPV 18 DNA Negative Negative  Negative     Other HR HPV Negative Negative  Negative       ASCVD Risk   The 10-year ASCVD risk score (Fozia DANIELS, et al., 2019) is:  0.4%    Values used to calculate the score:      Age: 39 years      Sex: Female      Is Non- : No      Diabetic: No      Tobacco smoker: No      Systolic Blood Pressure: 114 mmHg      Is BP treated: No      HDL Cholesterol: 46 mg/dL      Total Cholesterol: 157 mg/dL       Reviewed and updated as needed this visit by Provider     Meds                Past Medical History:   Diagnosis Date    Depression     Dysphagia 6/22/2020    Fatigue     Hypothyroid     Hypothyroidism     IBS (irritable bowel syndrome)     Migraine      Past Surgical History:   Procedure Laterality Date    ABDOMEN SURGERY  06/23/2022    Hysterectomy    COLONOSCOPY  end of the 2020 year    LAPAROSCOPIC ASSISTED HYSTERECTOMY VAGINAL N/A 06/23/2022    Procedure: LAPAROSCOPIC ASSISTED VAGINAL HYSTERECTOMY;  Surgeon: Rayna Tran MD;  Location: [facility] OR    LAPAROSCOPIC SALPINGECTOMY Bilateral 06/23/2022    Procedure: BILATERAL SALPINGECTOMY;  Surgeon: Rayna Tran MD;  Location: [facility] OR     OB History   No obstetric history on file.     BP Readings from Last 3 Encounters:   12/05/24 114/70   04/11/24 100/62   10/05/23 100/64    Wt Readings from Last 3 Encounters:   12/05/24 77.1 kg (170 lb)   04/11/24 75.8 kg (167 lb)   10/05/23 80.7 kg (178 lb)                  Patient Active Problem List   Diagnosis    Hypothyroidism    Depression, unspecified depression type    Dysmenorrhea    Chronic fatigue syndrome    IBS (irritable bowel syndrome)    Lymphopenia    Myalgia    Polyarthralgia    LETICIA positive    Fibromyalgia    Chronic low back pain    Menorrhagia    Thrombocytopenia (H)    Class 1 obesity due to excess calories without serious comorbidity with body mass index (BMI) of 32.0 to 32.9 in adult    INA (generalized anxiety disorder)    Major depression, single episode    Major depression, recurrent (H)    Leg weakness, bilateral    Major depressive disorder, recurrent, moderate (H)     Monoparesis of upper extremity (H)    Other specified eating disorder    Tremor, essential     Past Surgical History:   Procedure Laterality Date    ABDOMEN SURGERY  06/23/2022    Hysterectomy    COLONOSCOPY  end of the 2020 year    LAPAROSCOPIC ASSISTED HYSTERECTOMY VAGINAL N/A 06/23/2022    Procedure: LAPAROSCOPIC ASSISTED VAGINAL HYSTERECTOMY;  Surgeon: Rayna Tran MD;  Location: [facility] OR    LAPAROSCOPIC SALPINGECTOMY Bilateral 06/23/2022    Procedure: BILATERAL SALPINGECTOMY;  Surgeon: Rayna Tran MD;  Location: [facility]       Social History     Tobacco Use    Smoking status: Never     Passive exposure: Never    Smokeless tobacco: Never    Tobacco comments:     smokers in home   Substance Use Topics    Alcohol use: Yes     Comment: Alcoholic Drinks/day: rarely 4/year     Family History   Problem Relation Age of Onset    Thyroid Disease Other         Great Grandmother    Substance Abuse Father         passed away    Alcoholism Father     Thyroid Disease Maternal Grandmother         Grandmother    Arthritis Maternal Grandmother     Asthma Maternal Grandmother     Diabetes Maternal Grandmother     Cancer Brother         tumor in his thigh    Other Cancer Brother         Passed away    Substance Abuse Brother         passed away    Thyroid Disease Mother         Mother    Arthritis Mother     Depression Mother         Refuses treatment for it    Irritable Bowel Syndrome Sister     Depression Sister         Gets treatment for it    No Known Problems Brother     Cancer Brother          Tumor  in this thigh    Alcoholism Brother     Depression Brother     Substance Abuse Brother     Early Death Brother     Snoring Brother          Current Outpatient Medications   Medication Sig Dispense Refill    acetaminophen (TYLENOL) 500 MG tablet Take 1,000 mg by mouth      albuterol (PROAIR HFA/PROVENTIL HFA/VENTOLIN HFA) 108 (90 Base) MCG/ACT inhaler Inhale 2 puffs into the lungs every 6  hours as needed for shortness of breath, wheezing or cough 18 g 0    amphetamine-dextroamphetamine (ADDERALL XR) 30 MG 24 hr capsule Take 1 capsule (30 mg) by mouth daily. 30 capsule 0    amphetamine-dextroamphetamine (ADDERALL) 10 MG tablet Take 1 tablet (10 mg) by mouth daily. 30 tablet 0    cycloSPORINE (RESTASIS) 0.05 % ophthalmic emulsion 1 drop      fluticasone (FLONASE) 50 MCG/ACT spray Spray 1 spray into both nostrils daily      gabapentin (NEURONTIN) 300 MG capsule Take 1 capsule (300 mg) by mouth 2 times daily 90 capsule 1    hydroxychloroquine (PLAQUENIL) 200 MG tablet Take 1 tablet by mouth daily      levothyroxine (SYNTHROID/LEVOTHROID) 88 MCG tablet Take 1 tablet (88 mcg) by mouth daily. 90 tablet 3    Multiple Vitamin (MULTI VITAMIN DAILY PO)       pilocarpine (SALAGEN) 5 MG tablet Take 1 tablet by mouth 3 times daily      triamcinolone (KENALOG) 0.1 % external ointment Apply topically as needed for irritation.       Allergies   Allergen Reactions    Gluten Meal     Ibuprofen Sodium Cramps and GI Disturbance     Recent Labs   Lab Test 10/05/23  1048 07/17/23  1322 03/23/23  1622 06/17/22  1155 04/06/22  1503 09/01/21  0919 10/23/20  1635 09/28/20  0750 12/05/19  0913 10/18/19  0847   A1C  --   --  5.1  --   --   --   --   --   --  5.1   LDL  --   --   --   --   --  102  --   --   --  72   HDL  --   --   --   --   --  46*  --   --   --  47*   TRIG  --   --   --   --   --  46  --   --   --  34   ALT 17 24  --   --  24 24 34  --  24 79*   CR 0.79 0.79  --    < > 0.77 0.88 0.79  --  0.73 0.75   GFRESTIMATED >90 >90  --    < > >90 85 >60   < > >60 >60   GFRESTBLACK  --   --   --   --   --   --  >60  --  >60 >60   POTASSIUM 4.4 3.8  --    < > 4.0 4.4  --   --   --  4.0   TSH 1.35  --  1.69  --  1.92 2.24  --    < >  --  0.43    < > = values in this interval not displayed.          Review of Systems  Constitutional, HEENT, cardiovascular, pulmonary, GI, , musculoskeletal, neuro, skin, endocrine and psych  "systems are negative, except as otherwise noted.     Objective    Exam  /70   Pulse 83   Temp 97.5  F (36.4  C) (Oral)   Resp 16   Ht 1.651 m (5' 5\")   Wt 77.1 kg (170 lb)   LMP  (LMP Unknown)   SpO2 100%   BMI 28.29 kg/m     Estimated body mass index is 28.29 kg/m  as calculated from the following:    Height as of this encounter: 1.651 m (5' 5\").    Weight as of this encounter: 77.1 kg (170 lb).    Physical Exam  GENERAL: alert and no distress  EYES: Eyes grossly normal to inspection, PERRL and conjunctivae and sclerae normal  HENT: ear canals and TM's normal, nose and mouth without ulcers or lesions  NECK: no adenopathy   RESP: lungs clear to auscultation - no rales, rhonchi or wheezes  CV: regular rate and rhythm, normal S1 S2, no S3 or S4, no murmur, click or rub, no peripheral edema  ABDOMEN: soft, nontender, no hepatosplenomegaly, no masses and bowel sounds normal   (female) w/bimanual: normal female external genitalia, normal urethral meatus, normal vaginal mucosa, normal cervix/adnexa/uterus without masses or discharge, pap obtained   MS: no gross musculoskeletal defects noted, no edema  PSYCH: mentation appears normal, affect normal/bright    Signed Electronically by: Nadya Villalpando CNP    Answers submitted by the patient for this visit:  Patient Health Questionnaire (Submitted on 12/5/2024)  If you checked off any problems, how difficult have these problems made it for you to do your work, take care of things at home, or get along with other people?: Somewhat difficult  PHQ9 TOTAL SCORE: 11    "

## 2024-12-12 ENCOUNTER — ANCILLARY PROCEDURE (OUTPATIENT)
Dept: MAMMOGRAPHY | Facility: HOSPITAL | Age: 39
End: 2024-12-12
Attending: NURSE PRACTITIONER
Payer: COMMERCIAL

## 2024-12-12 DIAGNOSIS — Z12.31 VISIT FOR SCREENING MAMMOGRAM: ICD-10-CM

## 2024-12-12 PROBLEM — Z12.4 CERVICAL CANCER SCREENING: Status: ACTIVE | Noted: 2024-12-12

## 2024-12-12 LAB
BKR AP ASSOCIATED HPV REPORT: NORMAL
BKR LAB AP GYN ADEQUACY: NORMAL
BKR LAB AP GYN INTERPRETATION: NORMAL
BKR LAB AP PREVIOUS ABNORMAL: NORMAL
PATH REPORT.COMMENTS IMP SPEC: NORMAL
PATH REPORT.COMMENTS IMP SPEC: NORMAL
PATH REPORT.RELEVANT HX SPEC: NORMAL

## 2024-12-12 PROCEDURE — 77063 BREAST TOMOSYNTHESIS BI: CPT

## 2024-12-12 PROCEDURE — 77067 SCR MAMMO BI INCL CAD: CPT

## 2024-12-18 ENCOUNTER — MYC REFILL (OUTPATIENT)
Dept: INTERNAL MEDICINE | Facility: CLINIC | Age: 39
End: 2024-12-18
Payer: COMMERCIAL

## 2024-12-18 DIAGNOSIS — R41.840 DIFFICULTY CONCENTRATING: ICD-10-CM

## 2024-12-18 RX ORDER — DEXTROAMPHETAMINE SACCHARATE, AMPHETAMINE ASPARTATE, DEXTROAMPHETAMINE SULFATE AND AMPHETAMINE SULFATE 2.5; 2.5; 2.5; 2.5 MG/1; MG/1; MG/1; MG/1
10 TABLET ORAL DAILY
Qty: 30 TABLET | Refills: 0 | Status: SHIPPED | OUTPATIENT
Start: 2024-12-18

## 2024-12-29 ENCOUNTER — MYC REFILL (OUTPATIENT)
Dept: INTERNAL MEDICINE | Facility: CLINIC | Age: 39
End: 2024-12-29
Payer: COMMERCIAL

## 2024-12-29 DIAGNOSIS — R41.840 DIFFICULTY CONCENTRATING: ICD-10-CM

## 2024-12-30 RX ORDER — DEXTROAMPHETAMINE SACCHARATE, AMPHETAMINE ASPARTATE MONOHYDRATE, DEXTROAMPHETAMINE SULFATE AND AMPHETAMINE SULFATE 7.5; 7.5; 7.5; 7.5 MG/1; MG/1; MG/1; MG/1
30 CAPSULE, EXTENDED RELEASE ORAL DAILY
Qty: 30 CAPSULE | Refills: 0 | OUTPATIENT
Start: 2024-12-30

## 2025-01-22 ENCOUNTER — MYC REFILL (OUTPATIENT)
Dept: INTERNAL MEDICINE | Facility: CLINIC | Age: 40
End: 2025-01-22
Payer: COMMERCIAL

## 2025-01-22 DIAGNOSIS — R41.840 DIFFICULTY CONCENTRATING: ICD-10-CM

## 2025-01-23 RX ORDER — DEXTROAMPHETAMINE SACCHARATE, AMPHETAMINE ASPARTATE, DEXTROAMPHETAMINE SULFATE AND AMPHETAMINE SULFATE 2.5; 2.5; 2.5; 2.5 MG/1; MG/1; MG/1; MG/1
10 TABLET ORAL DAILY
Qty: 30 TABLET | Refills: 0 | Status: SHIPPED | OUTPATIENT
Start: 2025-01-23

## 2025-01-27 ENCOUNTER — OFFICE VISIT (OUTPATIENT)
Dept: URGENT CARE | Facility: URGENT CARE | Age: 40
End: 2025-01-27
Payer: COMMERCIAL

## 2025-01-27 ENCOUNTER — NURSE TRIAGE (OUTPATIENT)
Dept: FAMILY MEDICINE | Facility: CLINIC | Age: 40
End: 2025-01-27

## 2025-01-27 VITALS
TEMPERATURE: 97.2 F | DIASTOLIC BLOOD PRESSURE: 71 MMHG | OXYGEN SATURATION: 100 % | SYSTOLIC BLOOD PRESSURE: 110 MMHG | HEART RATE: 76 BPM

## 2025-01-27 DIAGNOSIS — R06.00 DYSPNEA, UNSPECIFIED TYPE: ICD-10-CM

## 2025-01-27 DIAGNOSIS — R06.02 SHORTNESS OF BREATH: Primary | ICD-10-CM

## 2025-01-27 DIAGNOSIS — R05.1 ACUTE COUGH: ICD-10-CM

## 2025-01-27 DIAGNOSIS — J18.9 ATYPICAL PNEUMONIA: ICD-10-CM

## 2025-01-27 LAB
FLUAV AG SPEC QL IA: NEGATIVE
FLUBV AG SPEC QL IA: NEGATIVE

## 2025-01-27 PROCEDURE — 87804 INFLUENZA ASSAY W/OPTIC: CPT

## 2025-01-27 PROCEDURE — 99214 OFFICE O/P EST MOD 30 MIN: CPT

## 2025-01-27 RX ORDER — DOXYCYCLINE HYCLATE 100 MG
100 TABLET ORAL 2 TIMES DAILY
Qty: 10 TABLET | Refills: 0 | Status: SHIPPED | OUTPATIENT
Start: 2025-01-27 | End: 2025-02-01

## 2025-01-27 ASSESSMENT — ENCOUNTER SYMPTOMS: FATIGUE: 1

## 2025-01-27 NOTE — TELEPHONE ENCOUNTER
"  Called patient per provider request to triage symptoms.    Patient has been SOB for 1 week and it is getting progressively worse.    She is speaking in short sentences and has to catch breath often.    She finds it difficult to walk stairs and has to stop to rest often.    Has dizziness and lightheaded when she bends over or moves quickly.    When asked if she had a cough she said she was too tired to cough.    Chest pain feels like a shard of glass when she tries to take deep breath.    Disposition is ER and patient will go there soon, advised to find ride there if possible.    Appointment with primary care cancelled.    Reason for Disposition   MODERATE difficulty breathing (e.g., speaks in phrases, SOB even at rest, pulse 100-120) of new-onset or worse than normal    Answer Assessment - Initial Assessment Questions  1. RESPIRATORY STATUS: \"Describe your breathing?\" (e.g., wheezing, shortness of breath, unable to speak, severe coughing)       SOB especially with activity, stairs ,speaking    2. ONSET: \"When did this breathing problem begin?\"       1 week ago    3. PATTERN \"Does the difficult breathing come and go, or has it been constant since it started?\"       Fairly constant, worse with movement.    4. SEVERITY: \"How bad is your breathing?\" (e.g., mild, moderate, severe)       Moderate. Getting difficult to complete normal activities    5. RECURRENT SYMPTOM: \"Have you had difficulty breathing before?\" If Yes, ask: \"When was the last time?\" and \"What happened that time?\"         Not like this, walking pneumonia in past and chest pain when taking deep breath is similar      6. CARDIAC HISTORY: \"Do you have any history of heart disease?\" (e.g., heart attack, angina, bypass surgery, angioplasty)       No    7. LUNG HISTORY: \"Do you have any history of lung disease?\"  (e.g., pulmonary embolus, asthma, emphysema)      No    8. CAUSE: \"What do you think is causing the breathing problem?\"       No idea    9. OTHER " "SYMPTOMS: \"Do you have any other symptoms?\" (e.g., chest pain, cough, dizziness, fever, runny nose)      Dizziness especially when bending over,    10. O2 SATURATION MONITOR:  \"Do you use an oxygen saturation monitor (pulse oximeter) at home?\" If Yes, ask: \"What is your reading (oxygen level) today?\" \"What is your usual oxygen saturation reading?\" (e.g., 95%)        NA    11. PREGNANCY: \"Is there any chance you are pregnant?\" \"When was your last menstrual period?\"        No    12. TRAVEL: \"Have you traveled out of the country in the last month?\" (e.g., travel history, exposures)        no    Protocols used: Breathing Difficulty-A-[place]      Christine M Klisch, RN    "

## 2025-01-27 NOTE — TELEPHONE ENCOUNTER
Please call patient to triage for dizziness, SOB, and chest pain. From her notes alone I would recommend ADS or ER, not clinic    Thank you,  Eli Miller PA-C

## 2025-01-28 NOTE — PROGRESS NOTES
"Assessment & Plan     Shortness of breath  Acute cough  - Influenza A & B Antigen - Clinic Collect    Atypical pneumonia  Patient has some symptoms of wheezing and feeling fatigued for the past week.  She is worried that she has a pulmonary embolism although given her normal vitals and satting well on room air I think this is unlikely.  She does have dyspnea and fatigue.  Her lungs are clear on exam.  I will opt to treat her for an atypical pneumonia given that her flu swabs were negative today.  Patient on hydroxychloroquine and thus I will treat with doxycycline for atypical pathogens.  - doxycycline hyclate (VIBRA-TABS) 100 MG tablet  Dispense: 10 tablet; Refill: 0    Dyspnea, unspecified type  Patient concerned about her ongoing dyspnea.  Physical exam pretty unremarkable.  Patient did have history of PFTs completed although unable to review these results at this time.  Will put in a referral to pulmonology for further workup of this dyspnea and leave it up to the patient if she desires to follow through with this.  Patient agreeable to the plan.  - Adult Pulmonary Medicine  Referral     No follow-ups on file.    Wade Humphreys MD  [facility] RANDY Ibarra is a 39 year old female who presents to clinic today for the following health issues:  Chief Complaint   Patient presents with    Breathing Problem     X1 week, hurts to breathe, SOD    Fatigue     X1week, lightheaded     Fatigue  Associated symptoms include fatigue.       Chief complaint of shortness of breath for over a week, accompanied by lightheadedness and chest pain for the past 4 days. The chest pain is described as sharp, like a \"shard of glass,\" and is exacerbated by deep breathing. Patient reports fatigue and whole body muscle congestion. The chest pain is reminiscent of a previous episode of walking pneumonia experienced in high school.    Patient experienced suspected food poisoning after " eating at [employer] last Sunday, resulting in dehydration, diarrhea, and persistent gastrointestinal upset. Initially struggled with fluid intake but improved after 3 days, now able to tolerate sipping water. Reports ongoing anorexia with nausea upon eating.    Additional symptoms include increased sensitivity to noise, mild frontal headache, and leg swelling. Patient also notes a globus sensation in the throat. There is a history of anxiety, which may be contributing to the globus sensation. Patient reports a tender area on one side of the abdomen and increased acne on back and hands, which is described as normal for them.    The patient has a history of walking pneumonia in high school and anxiety. Past treatments include a steroid injection at the sternum. The patient is employed, but the specific occupation is not mentioned.    Review of Systems includes fatigue, whole body muscle congestion, shortness of breath, chest pain, lightheadedness, upset stomach, decreased appetite, nausea, diarrhea (improved), abdominal tenderness on one side, ear sensitivity to noise, sinus pressure in forehead, globus sensation, increased pimples on back and hands, chest muscle sensitivity, and leg swelling.      Objective    /71 (BP Location: Right arm, Patient Position: Sitting, Cuff Size: Adult Regular)   Pulse 76   Temp 97.2  F (36.2  C) (Tympanic)   LMP  (LMP Unknown)   SpO2 100%   Physical Exam  Constitutional:       Appearance: Normal appearance.   HENT:      Right Ear: Tympanic membrane normal.      Left Ear: Tympanic membrane normal.      Nose: No congestion or rhinorrhea.      Mouth/Throat:      Mouth: Mucous membranes are moist.      Pharynx: Oropharynx is clear. No posterior oropharyngeal erythema.   Cardiovascular:      Rate and Rhythm: Normal rate and regular rhythm.      Heart sounds: No murmur heard.     No friction rub. No gallop.   Pulmonary:      Effort: Pulmonary effort is normal. No respiratory  distress.      Breath sounds: Normal breath sounds. No wheezing or rales.   Abdominal:      General: Abdomen is flat. There is no distension.      Palpations: Abdomen is soft.      Tenderness: There is no abdominal tenderness.   Skin:     General: Skin is warm and dry.   Neurological:      Mental Status: She is alert.   Psychiatric:         Mood and Affect: Mood normal.         Behavior: Behavior normal.

## 2025-01-30 ENCOUNTER — MYC REFILL (OUTPATIENT)
Dept: INTERNAL MEDICINE | Facility: CLINIC | Age: 40
End: 2025-01-30
Payer: COMMERCIAL

## 2025-01-30 DIAGNOSIS — R41.840 DIFFICULTY CONCENTRATING: ICD-10-CM

## 2025-01-30 RX ORDER — DEXTROAMPHETAMINE SACCHARATE, AMPHETAMINE ASPARTATE MONOHYDRATE, DEXTROAMPHETAMINE SULFATE AND AMPHETAMINE SULFATE 7.5; 7.5; 7.5; 7.5 MG/1; MG/1; MG/1; MG/1
30 CAPSULE, EXTENDED RELEASE ORAL DAILY
Qty: 30 CAPSULE | Refills: 0 | Status: SHIPPED | OUTPATIENT
Start: 2025-01-30

## 2025-02-13 DIAGNOSIS — M47.26 OSTEOARTHRITIS OF SPINE WITH RADICULOPATHY, LUMBAR REGION: ICD-10-CM

## 2025-02-13 RX ORDER — GABAPENTIN 300 MG/1
300 CAPSULE ORAL 2 TIMES DAILY
Qty: 90 CAPSULE | Refills: 1 | Status: SHIPPED | OUTPATIENT
Start: 2025-02-13

## 2025-02-16 ENCOUNTER — MYC REFILL (OUTPATIENT)
Dept: INTERNAL MEDICINE | Facility: CLINIC | Age: 40
End: 2025-02-16
Payer: COMMERCIAL

## 2025-02-16 DIAGNOSIS — R41.840 DIFFICULTY CONCENTRATING: ICD-10-CM

## 2025-02-18 RX ORDER — DEXTROAMPHETAMINE SACCHARATE, AMPHETAMINE ASPARTATE, DEXTROAMPHETAMINE SULFATE AND AMPHETAMINE SULFATE 2.5; 2.5; 2.5; 2.5 MG/1; MG/1; MG/1; MG/1
10 TABLET ORAL DAILY
Qty: 30 TABLET | Refills: 0 | Status: SHIPPED | OUTPATIENT
Start: 2025-02-18

## 2025-02-27 ENCOUNTER — MYC REFILL (OUTPATIENT)
Dept: INTERNAL MEDICINE | Facility: CLINIC | Age: 40
End: 2025-02-27
Payer: COMMERCIAL

## 2025-02-27 DIAGNOSIS — R41.840 DIFFICULTY CONCENTRATING: ICD-10-CM

## 2025-02-27 RX ORDER — DEXTROAMPHETAMINE SACCHARATE, AMPHETAMINE ASPARTATE MONOHYDRATE, DEXTROAMPHETAMINE SULFATE AND AMPHETAMINE SULFATE 7.5; 7.5; 7.5; 7.5 MG/1; MG/1; MG/1; MG/1
30 CAPSULE, EXTENDED RELEASE ORAL DAILY
Qty: 30 CAPSULE | Refills: 0 | Status: SHIPPED | OUTPATIENT
Start: 2025-02-27

## 2025-03-17 ENCOUNTER — MYC REFILL (OUTPATIENT)
Dept: INTERNAL MEDICINE | Facility: CLINIC | Age: 40
End: 2025-03-17
Payer: COMMERCIAL

## 2025-03-17 DIAGNOSIS — R41.840 DIFFICULTY CONCENTRATING: ICD-10-CM

## 2025-03-18 RX ORDER — DEXTROAMPHETAMINE SACCHARATE, AMPHETAMINE ASPARTATE, DEXTROAMPHETAMINE SULFATE AND AMPHETAMINE SULFATE 2.5; 2.5; 2.5; 2.5 MG/1; MG/1; MG/1; MG/1
10 TABLET ORAL DAILY
Qty: 30 TABLET | Refills: 0 | Status: SHIPPED | OUTPATIENT
Start: 2025-03-18

## 2025-03-26 ENCOUNTER — MYC REFILL (OUTPATIENT)
Dept: INTERNAL MEDICINE | Facility: CLINIC | Age: 40
End: 2025-03-26
Payer: COMMERCIAL

## 2025-03-26 DIAGNOSIS — R41.840 DIFFICULTY CONCENTRATING: ICD-10-CM

## 2025-03-26 RX ORDER — DEXTROAMPHETAMINE SACCHARATE, AMPHETAMINE ASPARTATE MONOHYDRATE, DEXTROAMPHETAMINE SULFATE AND AMPHETAMINE SULFATE 7.5; 7.5; 7.5; 7.5 MG/1; MG/1; MG/1; MG/1
30 CAPSULE, EXTENDED RELEASE ORAL DAILY
Qty: 30 CAPSULE | Refills: 0 | Status: SHIPPED | OUTPATIENT
Start: 2025-03-26

## 2025-04-02 ENCOUNTER — MYC MEDICAL ADVICE (OUTPATIENT)
Dept: INTERNAL MEDICINE | Facility: CLINIC | Age: 40
End: 2025-04-02
Payer: COMMERCIAL

## 2025-04-02 DIAGNOSIS — R41.840 DIFFICULTY CONCENTRATING: ICD-10-CM

## 2025-04-06 ENCOUNTER — NURSE TRIAGE (OUTPATIENT)
Dept: NURSING | Facility: CLINIC | Age: 40
End: 2025-04-06
Payer: COMMERCIAL

## 2025-04-06 NOTE — TELEPHONE ENCOUNTER
Patient requesting her Adderall prescription be moved to a different pharmacy.    Writer advised we are not able to transfer this medication.  Patient verbalized understanding and will call clinic in the morning.    Swapna Ryder RN  [facility]    Reason for Disposition    Caller requesting a CONTROLLED substance prescription refill (e.g., narcotics, ADHD medicines)    Protocols used: Medication Refill and Renewal Call-A-

## 2025-04-07 RX ORDER — DEXTROAMPHETAMINE SACCHARATE, AMPHETAMINE ASPARTATE MONOHYDRATE, DEXTROAMPHETAMINE SULFATE AND AMPHETAMINE SULFATE 7.5; 7.5; 7.5; 7.5 MG/1; MG/1; MG/1; MG/1
30 CAPSULE, EXTENDED RELEASE ORAL DAILY
Qty: 30 CAPSULE | Refills: 0 | Status: SHIPPED | OUTPATIENT
Start: 2025-04-07

## 2025-04-18 ENCOUNTER — MYC REFILL (OUTPATIENT)
Dept: INTERNAL MEDICINE | Facility: CLINIC | Age: 40
End: 2025-04-18
Payer: COMMERCIAL

## 2025-04-18 DIAGNOSIS — R41.840 DIFFICULTY CONCENTRATING: ICD-10-CM

## 2025-04-22 RX ORDER — DEXTROAMPHETAMINE SACCHARATE, AMPHETAMINE ASPARTATE, DEXTROAMPHETAMINE SULFATE AND AMPHETAMINE SULFATE 2.5; 2.5; 2.5; 2.5 MG/1; MG/1; MG/1; MG/1
10 TABLET ORAL DAILY
Qty: 30 TABLET | Refills: 0 | Status: SHIPPED | OUTPATIENT
Start: 2025-04-22

## 2025-05-05 ENCOUNTER — MYC REFILL (OUTPATIENT)
Dept: INTERNAL MEDICINE | Facility: CLINIC | Age: 40
End: 2025-05-05
Payer: COMMERCIAL

## 2025-05-05 DIAGNOSIS — R41.840 DIFFICULTY CONCENTRATING: ICD-10-CM

## 2025-05-06 RX ORDER — DEXTROAMPHETAMINE SACCHARATE, AMPHETAMINE ASPARTATE MONOHYDRATE, DEXTROAMPHETAMINE SULFATE AND AMPHETAMINE SULFATE 7.5; 7.5; 7.5; 7.5 MG/1; MG/1; MG/1; MG/1
30 CAPSULE, EXTENDED RELEASE ORAL DAILY
Qty: 30 CAPSULE | Refills: 0 | Status: SHIPPED | OUTPATIENT
Start: 2025-05-06

## 2025-05-16 ENCOUNTER — MYC REFILL (OUTPATIENT)
Dept: INTERNAL MEDICINE | Facility: CLINIC | Age: 40
End: 2025-05-16
Payer: COMMERCIAL

## 2025-05-16 DIAGNOSIS — R41.840 DIFFICULTY CONCENTRATING: ICD-10-CM

## 2025-05-20 RX ORDER — DEXTROAMPHETAMINE SACCHARATE, AMPHETAMINE ASPARTATE, DEXTROAMPHETAMINE SULFATE AND AMPHETAMINE SULFATE 2.5; 2.5; 2.5; 2.5 MG/1; MG/1; MG/1; MG/1
10 TABLET ORAL DAILY
Qty: 30 TABLET | Refills: 0 | Status: SHIPPED | OUTPATIENT
Start: 2025-05-20

## 2025-06-16 ENCOUNTER — MYC REFILL (OUTPATIENT)
Dept: INTERNAL MEDICINE | Facility: CLINIC | Age: 40
End: 2025-06-16
Payer: COMMERCIAL

## 2025-06-16 DIAGNOSIS — R41.840 DIFFICULTY CONCENTRATING: ICD-10-CM

## 2025-06-17 RX ORDER — DEXTROAMPHETAMINE SACCHARATE, AMPHETAMINE ASPARTATE, DEXTROAMPHETAMINE SULFATE AND AMPHETAMINE SULFATE 2.5; 2.5; 2.5; 2.5 MG/1; MG/1; MG/1; MG/1
10 TABLET ORAL DAILY
Qty: 30 TABLET | Refills: 0 | Status: SHIPPED | OUTPATIENT
Start: 2025-06-17

## 2025-07-01 ENCOUNTER — MYC REFILL (OUTPATIENT)
Dept: INTERNAL MEDICINE | Facility: CLINIC | Age: 40
End: 2025-07-01
Payer: COMMERCIAL

## 2025-07-01 DIAGNOSIS — R41.840 DIFFICULTY CONCENTRATING: ICD-10-CM

## 2025-07-01 RX ORDER — DEXTROAMPHETAMINE SACCHARATE, AMPHETAMINE ASPARTATE MONOHYDRATE, DEXTROAMPHETAMINE SULFATE AND AMPHETAMINE SULFATE 7.5; 7.5; 7.5; 7.5 MG/1; MG/1; MG/1; MG/1
30 CAPSULE, EXTENDED RELEASE ORAL DAILY
Qty: 30 CAPSULE | Refills: 0 | Status: SHIPPED | OUTPATIENT
Start: 2025-07-01

## 2025-07-19 ENCOUNTER — MYC REFILL (OUTPATIENT)
Dept: INTERNAL MEDICINE | Facility: CLINIC | Age: 40
End: 2025-07-19
Payer: COMMERCIAL

## 2025-07-19 DIAGNOSIS — R41.840 DIFFICULTY CONCENTRATING: ICD-10-CM

## 2025-07-20 ENCOUNTER — VIRTUAL VISIT (OUTPATIENT)
Dept: URGENT CARE | Facility: CLINIC | Age: 40
End: 2025-07-20
Payer: COMMERCIAL

## 2025-07-20 DIAGNOSIS — R10.84 ABDOMINAL PAIN, GENERALIZED: ICD-10-CM

## 2025-07-20 DIAGNOSIS — R35.0 URINARY FREQUENCY: Primary | ICD-10-CM

## 2025-07-20 PROCEDURE — 98005 SYNCH AUDIO-VIDEO EST LOW 20: CPT

## 2025-07-20 NOTE — PROGRESS NOTES
"  Katiuska De Jesus is a 39 year old female who is being evaluated via a billable video visit.      The patient has been notified of following at the time of scheduling video visit:     \"This video visit will be conducted via a video call between you and your physician/provider. We have found that certain health care needs can be provided without the need for a physical exam.  This service lets us provide the care you need with a video conversation.  If a prescription is necessary we can send it directly to your pharmacy.  If lab work is needed we can place an order for that and you can then stop by our lab to have the test done at a later time.\"   Patient has given consent for video visit?  YES    SUBJECTIVE:  Katiuska De Jesus is an 39 year old female who presents for urine concern.  Has had some lower abdominal discomfort radiating to low back on one side.  Started yesterday and is a little worse today.  Some nausea today but no vomiting.  Increased urinary frequency but not having pain in urethra with urination.  Noticed a pink tint to urine today but no mare blood.  Some chills today but no fevers.  Feels a little tired.  No diarrhea.  No BM today as hurt some to try to have one but they have been normal recently.  Had sxs similar to this once in past that turned out to be a uti.    PMH:   has a past medical history of Depression, Dysphagia (6/22/2020), Fatigue, Hypothyroid, Hypothyroidism, IBS (irritable bowel syndrome), and Migraine.  Patient Active Problem List   Diagnosis    Hypothyroidism    Depression, unspecified depression type    Dysmenorrhea    Chronic fatigue syndrome    IBS (irritable bowel syndrome)    Lymphopenia    Myalgia    Polyarthralgia    LETICIA positive    Fibromyalgia    Chronic low back pain    Menorrhagia    Thrombocytopenia    Class 1 obesity due to excess calories without serious comorbidity with body mass index (BMI) of 32.0 to 32.9 in adult    INA (generalized anxiety disorder)    " Major depression, single episode    Major depression, recurrent (H)    Leg weakness, bilateral    Major depressive disorder, recurrent, moderate (H)    Monoparesis of upper extremity (H)    Other specified eating disorder    Tremor, essential    Cervical cancer screening     Social History     Socioeconomic History    Marital status: Single   Occupational History    Occupation:    Tobacco Use    Smoking status: Never     Passive exposure: Never    Smokeless tobacco: Never    Tobacco comments:     smokers in home   Vaping Use    Vaping status: Never Used   Substance and Sexual Activity    Alcohol use: Yes     Comment: Alcoholic Drinks/day: rarely 4/year    Drug use: Never    Sexual activity: Not Currently     Partners: Male     Birth control/protection: Abstinence, Condom, Female Surgical   Other Topics Concern    Parent/sibling w/ CABG, MI or angioplasty before 65F 55M? No     Social Drivers of Health     Financial Resource Strain: Low Risk  (12/1/2024)    Financial Resource Strain     Within the past 12 months, have you or your family members you live with been unable to get utilities (heat, electricity) when it was really needed?: No   Food Insecurity: Unknown (12/1/2024)    Food Insecurity     Within the past 12 months, did you worry that your food would run out before you got money to buy more?: Patient declined     Within the past 12 months, did the food you bought just not last and you didn t have money to get more?: Patient declined   Transportation Needs: Low Risk  (12/1/2024)    Transportation Needs     Within the past 12 months, has lack of transportation kept you from medical appointments, getting your medicines, non-medical meetings or appointments, work, or from getting things that you need?: No   Physical Activity: Insufficiently Active (12/1/2024)    Exercise Vital Sign     Days of Exercise per Week: 2 days     Minutes of Exercise per Session: 40 min   Stress: Stress Concern Present  (12/1/2024)    [employer] - Occupational Stress Questionnaire     Feeling of Stress : Very much   Social Connections: Unknown (12/1/2024)    Social Connection and Isolation Panel [NHANES]     Frequency of Social Gatherings with Friends and Family: Never   Interpersonal Safety: Low Risk  (10/5/2023)    Interpersonal Safety     Do you feel physically and emotionally safe where you currently live?: Yes     Within the past 12 months, have you been hit, slapped, kicked or otherwise physically hurt by someone?: No     Within the past 12 months, have you been humiliated or emotionally abused in other ways by your partner or ex-partner?: No   Housing Stability: Low Risk  (12/1/2024)    Housing Stability     Do you have housing? : Yes     Are you worried about losing your housing?: Patient declined     Family History   Problem Relation Age of Onset    Thyroid Disease Other         Great Grandmother    Substance Abuse Father         passed away    Alcoholism Father     Thyroid Disease Maternal Grandmother         Grandmother    Arthritis Maternal Grandmother     Asthma Maternal Grandmother     Diabetes Maternal Grandmother     Cancer Brother         tumor in his thigh    Other Cancer Brother         Passed away    Substance Abuse Brother         passed away    Thyroid Disease Mother         Mother    Arthritis Mother     Depression Mother         Refuses treatment for it    Irritable Bowel Syndrome Sister     Depression Sister         Gets treatment for it    No Known Problems Brother     Cancer Brother          Tumor  in this thigh    Alcoholism Brother     Depression Brother     Substance Abuse Brother     Early Death Brother     Snoring Brother        ALLERGIES:  Gluten meal and Ibuprofen sodium    Current Outpatient Medications   Medication Sig Dispense Refill    acetaminophen (TYLENOL) 500 MG tablet Take 1,000 mg by mouth      albuterol (PROAIR HFA/PROVENTIL HFA/VENTOLIN HFA) 108 (90 Base)  MCG/ACT inhaler Inhale 2 puffs into the lungs every 6 hours as needed for shortness of breath, wheezing or cough 18 g 0    amphetamine-dextroamphetamine (ADDERALL XR) 30 MG 24 hr capsule Take 1 capsule (30 mg) by mouth daily. 30 capsule 0    amphetamine-dextroamphetamine (ADDERALL) 10 MG tablet Take 1 tablet (10 mg) by mouth daily. DUE FOR APPT FOR FUTURE REFILLS 30 tablet 0    cycloSPORINE (RESTASIS) 0.05 % ophthalmic emulsion 1 drop      fluticasone (FLONASE) 50 MCG/ACT spray Spray 1 spray into both nostrils daily      gabapentin (NEURONTIN) 300 MG capsule Take 1 capsule (300 mg) by mouth 2 times daily. 90 capsule 1    levothyroxine (SYNTHROID/LEVOTHROID) 88 MCG tablet Take 1 tablet (88 mcg) by mouth daily. 90 tablet 3    Multiple Vitamin (MULTI VITAMIN DAILY PO)       pilocarpine (SALAGEN) 5 MG tablet Take 1 tablet by mouth 3 times daily      triamcinolone (KENALOG) 0.1 % external ointment Apply topically as needed for irritation.       No current facility-administered medications for this visit.         ROS:  ROS is done and is negative for general/constitutional, eye, ENT, Respiratory, cardiovascular, GI, , Skin, musculoskeletal except as noted elsewhere.  All other review of systems negative except as noted elsewhere.      OBJECTIVE:    No vital signs obtained as is virtual visit    GENERAL: alert and no distress  EYES: Eyes grossly normal to inspection.  No discharge or erythema, or obvious scleral/conjunctival abnormalities.  RESP: No audible wheeze, cough, or visible cyanosis.    SKIN: Visible skin clear. No significant rash, abnormal pigmentation or lesions.  NEURO: Cranial nerves grossly intact.  Mentation and speech appropriate for age.  PSYCH: Appropriate affect, tone, and pace of words         ASSESSMENT/PLAN:    ASSESSMENT / PLAN:  (R35.0) Urinary frequency  (primary encounter diagnosis)  (R10.84) Abdominal pain, generalized  Comment: sxs could be due to uti but lack of dysuria and some atypical  pain, consider other possibilities as well such as nephrolithiasis, constipation, or other causes.  Will have pt do a ua  Plan: UA Macroscopic with reflex to Microscopic and         Culture - Lab Collect        Await urine result and if c/with uti, start abx. If not c/with uti, pt to monitor sxs and if not improving is to have in person visit for further evaluation.  I reviewed supportive care, otc meds to use if needed, expected course, and signs of concern.  Any lab and imaging results available at visit were reviewed by me.  Follow up as needed or if does not improve within 2 day(s) or if worsens in any way.  Reviewed red flag symptoms and is to go to the ER if experiences any of these.          See [facility] for orders, medications, letters, patient instructions    Phuong Fuller MD  7/20/2025, 4:15 PM    Video-Visit Details    Video Start Time: 4:20    Type of service:  Video Visit    Video End Time:4:31 PM    Originating Location (pt. Location): Home    Distant Location (provider location):  [facility]     Platform used for Video Visit: [employer]

## 2025-07-21 RX ORDER — DEXTROAMPHETAMINE SACCHARATE, AMPHETAMINE ASPARTATE, DEXTROAMPHETAMINE SULFATE AND AMPHETAMINE SULFATE 2.5; 2.5; 2.5; 2.5 MG/1; MG/1; MG/1; MG/1
10 TABLET ORAL DAILY
Qty: 30 TABLET | Refills: 0 | Status: SHIPPED | OUTPATIENT
Start: 2025-07-21

## 2025-08-01 ENCOUNTER — MYC REFILL (OUTPATIENT)
Dept: INTERNAL MEDICINE | Facility: CLINIC | Age: 40
End: 2025-08-01
Payer: COMMERCIAL

## 2025-08-01 DIAGNOSIS — R41.840 DIFFICULTY CONCENTRATING: ICD-10-CM

## 2025-08-04 RX ORDER — DEXTROAMPHETAMINE SACCHARATE, AMPHETAMINE ASPARTATE MONOHYDRATE, DEXTROAMPHETAMINE SULFATE AND AMPHETAMINE SULFATE 7.5; 7.5; 7.5; 7.5 MG/1; MG/1; MG/1; MG/1
30 CAPSULE, EXTENDED RELEASE ORAL DAILY
Qty: 30 CAPSULE | Refills: 0 | Status: SHIPPED | OUTPATIENT
Start: 2025-08-04

## 2025-08-14 ENCOUNTER — OFFICE VISIT (OUTPATIENT)
Dept: INTERNAL MEDICINE | Facility: CLINIC | Age: 40
End: 2025-08-14
Payer: COMMERCIAL

## 2025-08-14 VITALS
TEMPERATURE: 97.7 F | RESPIRATION RATE: 20 BRPM | SYSTOLIC BLOOD PRESSURE: 122 MMHG | WEIGHT: 169.4 LBS | OXYGEN SATURATION: 100 % | BODY MASS INDEX: 28.22 KG/M2 | HEIGHT: 65 IN | DIASTOLIC BLOOD PRESSURE: 79 MMHG | HEART RATE: 73 BPM

## 2025-08-14 DIAGNOSIS — Z56.6 WORK STRESS: ICD-10-CM

## 2025-08-14 DIAGNOSIS — M79.89 LEFT LEG SWELLING: Primary | ICD-10-CM

## 2025-08-14 DIAGNOSIS — E03.9 HYPOTHYROIDISM, UNSPECIFIED TYPE: ICD-10-CM

## 2025-08-14 DIAGNOSIS — R41.840 DIFFICULTY CONCENTRATING: ICD-10-CM

## 2025-08-14 LAB
AMPHETAMINES UR QL SCN: ABNORMAL
BARBITURATES UR QL SCN: ABNORMAL
BENZODIAZ UR QL SCN: ABNORMAL
BZE UR QL SCN: ABNORMAL
CANNABINOIDS UR QL SCN: ABNORMAL
FENTANYL UR QL: ABNORMAL
OPIATES UR QL SCN: ABNORMAL
PCP QUAL URINE (ROCHE): ABNORMAL

## 2025-08-14 RX ORDER — DEXTROAMPHETAMINE SACCHARATE, AMPHETAMINE ASPARTATE, DEXTROAMPHETAMINE SULFATE AND AMPHETAMINE SULFATE 2.5; 2.5; 2.5; 2.5 MG/1; MG/1; MG/1; MG/1
10 TABLET ORAL DAILY
Qty: 30 TABLET | Refills: 0 | Status: SHIPPED | OUTPATIENT
Start: 2025-10-16 | End: 2025-11-15

## 2025-08-14 RX ORDER — DEXTROAMPHETAMINE SACCHARATE, AMPHETAMINE ASPARTATE, DEXTROAMPHETAMINE SULFATE AND AMPHETAMINE SULFATE 2.5; 2.5; 2.5; 2.5 MG/1; MG/1; MG/1; MG/1
10 TABLET ORAL DAILY
Qty: 30 TABLET | Refills: 0 | Status: SHIPPED | OUTPATIENT
Start: 2025-08-21 | End: 2025-09-20

## 2025-08-14 RX ORDER — CYCLOBENZAPRINE HCL 10 MG
10 TABLET ORAL
COMMUNITY
Start: 2025-06-16

## 2025-08-14 RX ORDER — DEXTROAMPHETAMINE SACCHARATE, AMPHETAMINE ASPARTATE, DEXTROAMPHETAMINE SULFATE AND AMPHETAMINE SULFATE 2.5; 2.5; 2.5; 2.5 MG/1; MG/1; MG/1; MG/1
10 TABLET ORAL DAILY
Qty: 30 TABLET | Refills: 0 | Status: SHIPPED | OUTPATIENT
Start: 2025-09-18 | End: 2025-10-18

## 2025-08-14 RX ORDER — HYDROXYCHLOROQUINE SULFATE 200 MG/1
200 TABLET, FILM COATED ORAL DAILY
COMMUNITY
Start: 2025-07-22

## 2025-08-14 RX ORDER — CELECOXIB 200 MG/1
CAPSULE ORAL
COMMUNITY
Start: 2025-02-04

## 2025-08-14 RX ORDER — DEXTROAMPHETAMINE SACCHARATE, AMPHETAMINE ASPARTATE MONOHYDRATE, DEXTROAMPHETAMINE SULFATE AND AMPHETAMINE SULFATE 7.5; 7.5; 7.5; 7.5 MG/1; MG/1; MG/1; MG/1
30 CAPSULE, EXTENDED RELEASE ORAL DAILY
Qty: 30 CAPSULE | Refills: 0 | Status: SHIPPED | OUTPATIENT
Start: 2025-09-01 | End: 2025-10-01

## 2025-08-14 RX ORDER — DEXTROAMPHETAMINE SACCHARATE, AMPHETAMINE ASPARTATE MONOHYDRATE, DEXTROAMPHETAMINE SULFATE AND AMPHETAMINE SULFATE 7.5; 7.5; 7.5; 7.5 MG/1; MG/1; MG/1; MG/1
30 CAPSULE, EXTENDED RELEASE ORAL DAILY
Qty: 30 CAPSULE | Refills: 0 | Status: SHIPPED | OUTPATIENT
Start: 2025-09-29 | End: 2025-10-29

## 2025-08-14 RX ORDER — DEXTROAMPHETAMINE SACCHARATE, AMPHETAMINE ASPARTATE MONOHYDRATE, DEXTROAMPHETAMINE SULFATE AND AMPHETAMINE SULFATE 7.5; 7.5; 7.5; 7.5 MG/1; MG/1; MG/1; MG/1
30 CAPSULE, EXTENDED RELEASE ORAL DAILY
Qty: 30 CAPSULE | Refills: 0 | Status: SHIPPED | OUTPATIENT
Start: 2025-10-20 | End: 2025-11-19

## 2025-08-14 SDOH — HEALTH STABILITY - MENTAL HEALTH: OTHER PHYSICAL AND MENTAL STRAIN RELATED TO WORK: Z56.6

## 2025-08-14 ASSESSMENT — PATIENT HEALTH QUESTIONNAIRE - PHQ9
10. IF YOU CHECKED OFF ANY PROBLEMS, HOW DIFFICULT HAVE THESE PROBLEMS MADE IT FOR YOU TO DO YOUR WORK, TAKE CARE OF THINGS AT HOME, OR GET ALONG WITH OTHER PEOPLE: SOMEWHAT DIFFICULT
SUM OF ALL RESPONSES TO PHQ QUESTIONS 1-9: 13
SUM OF ALL RESPONSES TO PHQ QUESTIONS 1-9: 13

## 2025-08-18 LAB
AMPHET UR-MCNC: >5000 NG/ML
MDA UR-MCNC: <200 NG/ML
MDEA UR-MCNC: <200 NG/ML
MDMA UR-MCNC: <200 NG/ML
METHAMPHET UR-MCNC: <200 NG/ML
PHENTERMINE UR CFM-MCNC: <200 NG/ML

## 2025-08-30 ENCOUNTER — MYC REFILL (OUTPATIENT)
Dept: INTERNAL MEDICINE | Facility: CLINIC | Age: 40
End: 2025-08-30
Payer: COMMERCIAL

## 2025-08-30 DIAGNOSIS — M47.26 OSTEOARTHRITIS OF SPINE WITH RADICULOPATHY, LUMBAR REGION: ICD-10-CM

## 2025-08-30 DIAGNOSIS — R06.2 WHEEZE: ICD-10-CM

## 2025-09-03 RX ORDER — GABAPENTIN 300 MG/1
300 CAPSULE ORAL 2 TIMES DAILY
Qty: 90 CAPSULE | Refills: 1 | Status: SHIPPED | OUTPATIENT
Start: 2025-09-03

## 2025-09-03 RX ORDER — ALBUTEROL SULFATE 90 UG/1
2 INHALANT RESPIRATORY (INHALATION) EVERY 6 HOURS PRN
Qty: 18 G | Refills: 0 | Status: SHIPPED | OUTPATIENT
Start: 2025-09-03

## (undated) DEVICE — GLOVE UNDER INDICATOR PI SZ 7.0 LF 41670

## (undated) DEVICE — CUSTOM PACK PELVISCOPY SMA5BPVHEA

## (undated) DEVICE — DRSG STERI STRIP 1X5" R1548

## (undated) DEVICE — ESU HOLDER LAP INST DISP PURPLE LONG 330MM H-PRO-330

## (undated) DEVICE — NDL SPINAL 20GA 3.5"

## (undated) DEVICE — BRIEF STRETCH XL MPS40

## (undated) DEVICE — SUCTION MANIFOLD NEPTUNE 2 SYS 1 PORT 702-025-000

## (undated) DEVICE — ESU LIGASURE LAPAROSCOPIC BLUNT TIP SEALER 5MMX37CM LF1837

## (undated) DEVICE — SYR BULB IRRIG DOVER 60 ML LATEX FREE 67000

## (undated) DEVICE — ENDO TROCAR SLEEVE KII ADV FIXATION 05X100MM CFS02

## (undated) DEVICE — GLOVE BIOGEL PI ULTRATOUCH G SZ 6.5 42165

## (undated) DEVICE — PAD POS XL 1X20X40IN PINK PIGAZZI

## (undated) DEVICE — ESU PENCIL SMOKE EVAC W/ROCKER SWITCH 0703-047-000

## (undated) DEVICE — SUCTION TIP YANKAUER W/O VENT K86

## (undated) DEVICE — SOL RINGERS LACTATED 1000ML BAG 2B2324X

## (undated) DEVICE — ADH SKIN CLOSURE PREMIERPRO EXOFIN 1.0ML 3470

## (undated) DEVICE — SOL WATER IRRIG 1000ML BOTTLE 2F7114

## (undated) DEVICE — DECANTER VIAL 2006S

## (undated) DEVICE — GOWN LG DISP 9515

## (undated) DEVICE — PREP SCRUB SOL EXIDINE 4% CHG 4OZ 29002-404

## (undated) DEVICE — SYRINGE 10ML FILL SALINE FLUSH STERILE 306553

## (undated) DEVICE — TROCAR ADV FIXATION NONBLADED 5X100MM

## (undated) DEVICE — PREP CHLORAPREP 26ML TINTED HI-LITE ORANGE 930815

## (undated) DEVICE — GLOVE BIOGEL PI SZ 8.0 40880

## (undated) DEVICE — SU MONOCRYL+ 4-0 18IN PS2 UND MCP496G

## (undated) DEVICE — TUBING LAP SUCT/IRRIG STRYKER 250070500

## (undated) DEVICE — SUTURE VICRYL+ 0 36IN CT-1 UND VCP946H

## (undated) DEVICE — SURGICEL POWDER ABSORBABLE HEMOSTAT 3GM 3013SP

## (undated) DEVICE — PLATE GROUNDING ADULT W/CORD 9165L

## (undated) DEVICE — MAT FLOOR SURGICAL 40X38 0702140238

## (undated) DEVICE — ENDO SHEARS RENEW LAP ENDOCUT SCISSOR TIP 16.5MM 3142

## (undated) DEVICE — APPLICATOR ENDOSCOPIC 5 SURGICEL POWDER 3123SPEA

## (undated) DEVICE — SUTURE VICRYL+ 0 27IN CT-1 UND VCP260H

## (undated) DEVICE — GOWN IMPERVIOUS BREATHABLE SMART LG 89015

## (undated) DEVICE — TUBING SMOKE EVAC PNEUMOCLEAR HIGH FLOW 0620050250

## (undated) DEVICE — GOWN IMPERVIOUS BREATHABLE SMART XLG 89045

## (undated) DEVICE — PROTECTOR ARM STANDARD ONE STEP

## (undated) DEVICE — Device

## (undated) DEVICE — CATH FOLEY 5CC 16FR SIL/LTX 0165V16S

## (undated) DEVICE — GLOVE UNDER INDICATOR PI SZ 6.5 LF 41665

## (undated) RX ORDER — PROPOFOL 10 MG/ML
INJECTION, EMULSION INTRAVENOUS
Status: DISPENSED
Start: 2022-06-23

## (undated) RX ORDER — ONDANSETRON 2 MG/ML
INJECTION INTRAMUSCULAR; INTRAVENOUS
Status: DISPENSED
Start: 2022-06-23

## (undated) RX ORDER — KETAMINE HYDROCHLORIDE 10 MG/ML
INJECTION INTRAMUSCULAR; INTRAVENOUS
Status: DISPENSED
Start: 2022-06-23

## (undated) RX ORDER — BACITRACIN ZINC 500 [USP'U]/G
OINTMENT TOPICAL
Status: DISPENSED
Start: 2022-06-23

## (undated) RX ORDER — PHENYLEPHRINE HYDROCHLORIDE 10 MG/ML
INJECTION INTRAVENOUS
Status: DISPENSED
Start: 2022-06-23

## (undated) RX ORDER — BUPIVACAINE HYDROCHLORIDE 2.5 MG/ML
INJECTION, SOLUTION EPIDURAL; INFILTRATION; INTRACAUDAL
Status: DISPENSED
Start: 2022-06-23

## (undated) RX ORDER — LIDOCAINE HYDROCHLORIDE 10 MG/ML
INJECTION, SOLUTION EPIDURAL; INFILTRATION; INTRACAUDAL; PERINEURAL
Status: DISPENSED
Start: 2022-06-23

## (undated) RX ORDER — FENTANYL CITRATE 50 UG/ML
INJECTION, SOLUTION INTRAMUSCULAR; INTRAVENOUS
Status: DISPENSED
Start: 2022-05-09

## (undated) RX ORDER — DEXAMETHASONE SODIUM PHOSPHATE 10 MG/ML
INJECTION INTRAMUSCULAR; INTRAVENOUS
Status: DISPENSED
Start: 2022-06-23

## (undated) RX ORDER — FENTANYL CITRATE 50 UG/ML
INJECTION, SOLUTION INTRAMUSCULAR; INTRAVENOUS
Status: DISPENSED
Start: 2022-06-23